# Patient Record
Sex: FEMALE | Race: WHITE | NOT HISPANIC OR LATINO | Employment: OTHER | URBAN - METROPOLITAN AREA
[De-identification: names, ages, dates, MRNs, and addresses within clinical notes are randomized per-mention and may not be internally consistent; named-entity substitution may affect disease eponyms.]

---

## 2017-01-24 ENCOUNTER — DOCUMENTATION ONLY (OUTPATIENT)
Dept: LAB | Facility: CLINIC | Age: 62
End: 2017-01-24

## 2017-01-24 DIAGNOSIS — I10 HYPERTENSION GOAL BP (BLOOD PRESSURE) < 150/90: Primary | ICD-10-CM

## 2017-01-24 DIAGNOSIS — E78.5 HYPERLIPIDEMIA LDL GOAL <130: ICD-10-CM

## 2017-01-24 NOTE — PROGRESS NOTES
Need previsit labs-see orders and close encounter if nothing else needed./Violet Elam,       Physical 2/6/17

## 2017-01-24 NOTE — PROGRESS NOTES
.Please place or confirm pending lab orders for upcoming lab appointment on 01/30/17.  ThanksLoly

## 2017-01-30 ENCOUNTER — TRANSFERRED RECORDS (OUTPATIENT)
Dept: HEALTH INFORMATION MANAGEMENT | Facility: CLINIC | Age: 62
End: 2017-01-30

## 2017-01-30 DIAGNOSIS — E78.5 HYPERLIPIDEMIA LDL GOAL <130: ICD-10-CM

## 2017-01-30 DIAGNOSIS — I10 HYPERTENSION GOAL BP (BLOOD PRESSURE) < 150/90: ICD-10-CM

## 2017-01-30 LAB
ANION GAP SERPL CALCULATED.3IONS-SCNC: 11 MMOL/L (ref 3–14)
BUN SERPL-MCNC: 19 MG/DL (ref 7–30)
CALCIUM SERPL-MCNC: 9.5 MG/DL (ref 8.5–10.1)
CHLORIDE SERPL-SCNC: 105 MMOL/L (ref 94–109)
CHOLEST SERPL-MCNC: 224 MG/DL
CO2 SERPL-SCNC: 24 MMOL/L (ref 20–32)
CREAT SERPL-MCNC: 0.83 MG/DL (ref 0.52–1.04)
GFR SERPL CREATININE-BSD FRML MDRD: 70 ML/MIN/1.7M2
GLUCOSE SERPL-MCNC: 107 MG/DL (ref 70–99)
HDLC SERPL-MCNC: 91 MG/DL
LDLC SERPL CALC-MCNC: 113 MG/DL
NONHDLC SERPL-MCNC: 133 MG/DL
POTASSIUM SERPL-SCNC: 4.1 MMOL/L (ref 3.4–5.3)
SODIUM SERPL-SCNC: 140 MMOL/L (ref 133–144)
TRIGL SERPL-MCNC: 102 MG/DL

## 2017-01-30 PROCEDURE — 80048 BASIC METABOLIC PNL TOTAL CA: CPT | Performed by: FAMILY MEDICINE

## 2017-01-30 PROCEDURE — 36415 COLL VENOUS BLD VENIPUNCTURE: CPT | Performed by: FAMILY MEDICINE

## 2017-01-30 PROCEDURE — 80061 LIPID PANEL: CPT | Performed by: FAMILY MEDICINE

## 2017-01-31 NOTE — PROGRESS NOTES
SUBJECTIVE:     CC: Nannette Awad is an 61 year old woman who presents for preventive health visit.     Healthy Habits:    Do you get at least three servings of calcium containing foods daily (dairy, green leafy vegetables, etc.)? 4-5 days a week     Amount of exercise or daily activities, outside of work: 3 day(s) per week    Problems taking medications regularly No    Medication side effects: No    Have you had an eye exam in the past two years? yes    Do you see a dentist twice per year? yes    Do you have sleep apnea, excessive snoring or daytime drowsiness?no        Pt states that her BP readings at home have been higher  140/60's   Skin lesion on left ear, never goes away, hard/crusty  Still with painful superficial clot on leg, wondering if it will improve, has tried hot packs.    Today's PHQ-2 Score:   PHQ-2 (  [employer]) 2017 2/15/2016   Q1: Little interest or pleasure in doing things 0 0   Q2: Feeling down, depressed or hopeless 0 0   PHQ-2 Score 0 0       Abuse: Current or Past(Physical, Sexual or Emotional)- No  Do you feel safe in your environment - Yes    Social History   Substance Use Topics     Smoking status: Never Smoker      Smokeless tobacco: Never Used     Alcohol Use: No     The patient does not drink >3 drinks per day nor >7 drinks per week.      Reviewed orders with patient.  Reviewed health maintenance and updated orders accordingly - Yes    G 3 P 2   No LMP recorded. Patient has had a hysterectomy.     Fasting: No   Td: tdap 10/10       Status post benign hysterectomy. Health Maintenance and Surgical History updated.     HPV: NA          Cholesterol: CHOL      224   2017  HDL       91   2017  LDL      113   2017  TRIG      102   2017  CHOLHDLRATIO      2.7   2015      MM/16  Dexa:  NA     Flex/colo:       Seat Belt: Yes    Sunscreen use: Yes   Calcium Intake: adeq  Health Care Directive: No  Sexually Active: Yes     Current contraception:  hysterectomy  History of abnormal Pap smear: Yes: see pmh/psh  Family history of colon/breast/ovarian cancer: Yes: see [facility]  Regular self breast exam: Yes  History of abnormal mammogram: Yes: see reports       Mammo Decision Support:  Patient over age 50, mutual decision to screen reflected in health maintenance.    Pertinent mammograms are reviewed under the imaging tab.    ROS:  C: NEGATIVE for fever, chills, change in weight  I: NEGATIVE for worrisome rashes, moles or lesions  E: NEGATIVE for vision changes or irritation  ENT: NEGATIVE for ear, mouth and throat problems  R: NEGATIVE for significant cough or SOB  B: NEGATIVE for masses, tenderness or discharge  CV: NEGATIVE for chest pain, palpitations or peripheral edema  GI: NEGATIVE for nausea, abdominal pain, heartburn, or change in bowel habits  : NEGATIVE for unusual urinary or vaginal symptoms. No vaginal bleeding.  M: NEGATIVE for significant arthralgias or myalgia  N: NEGATIVE for weakness, dizziness or paresthesias  P: NEGATIVE for changes in mood or affect     Problem list, Medication list, Allergies, and Medical/Social/Surgical histories reviewed in [facility] and updated as appropriate.  Labs reviewed in EPIC  BP Readings from Last 3 Encounters:   02/06/17 142/74   10/21/16 136/72   10/19/16 149/73    Wt Readings from Last 3 Encounters:   02/06/17 180 lb (81.647 kg)   10/21/16 185 lb (83.915 kg)   10/19/16 184 lb (83.462 kg)                  Patient Active Problem List   Diagnosis     Hyperlipidemia LDL goal <130     Hypertension goal BP (blood pressure) < 150/90     Advanced directives, counseling/discussion     Osteopenia     GERD (gastroesophageal reflux disease)     Family history of breast cancer     Migraine without status migrainosus, not intractable, unspecified migraine type     Alopecia     Elevated fasting glucose     Past Surgical History   Procedure Laterality Date     Breast biopsy, core rt/lt  1990     Surgical history of -   2006      conization for abnl PAP     C ligatn/strip long or short saphen  11/09     right leg     Esophagoscopy, gastroscopy, duodenoscopy (egd), combined  9/17/2012     Procedure: COMBINED ESOPHAGOSCOPY, GASTROSCOPY, DUODENOSCOPY (EGD), BIOPSY SINGLE OR MULTIPLE;  EGD-GERD;  Surgeon: Teo Alvarado MD;  Location: [facility] OR     Hysterectomy vaginal, bilateral salpingo-oopherectomy, combined  9/22/06     HSIL        Social History   Substance Use Topics     Smoking status: Never Smoker      Smokeless tobacco: Never Used     Alcohol Use: No     Family History   Problem Relation Age of Onset     HEART DISEASE Mother      CHF     Hypertension Mother      Lipids Mother      Neurologic Disorder Mother 30     migraines     Alzheimer Disease Mother 67     CANCER Maternal Grandmother      Hypertension Brother      Hypertension Brother      Thyroid Disease Paternal Grandmother      CANCER Father 76     Pancreatic ca age 76     Breast Cancer Paternal Grandmother      Breast Cancer Maternal Aunt          Current Outpatient Prescriptions   Medication Sig Dispense Refill     lisinopril (PRINIVIL/ZESTRIL) 10 MG tablet Take 1 tablet (10 mg) by mouth daily 90 tablet 1     pravastatin (PRAVACHOL) 40 MG tablet Take 1 tablet (40 mg) by mouth daily 90 tablet 3     amLODIPine (NORVASC) 10 MG tablet Take 1 tablet (10 mg) by mouth daily 90 tablet 1     sertraline (ZOLOFT) 100 MG tablet Take 1 tablet (100 mg) by mouth daily 90 tablet 3     omeprazole 20 MG tablet Take 1 tablet (20 mg) by mouth as needed Take 30-60 minutes before a meal. 90 tablet 3     rizatriptan (MAXALT-MLT) 10 MG disintegrating tablet Take 1 tablet (10 mg) by mouth See Admin Instructions WITH ONSET OF MIGRAINE, MAY REPEAT ONCE AFTER 2 HOURS. DO NOT EXCEED 3 TABLETS IN 24 HOURS. 25 tablet 3     Minoxidil (ROGAINE WOMENS) 5 % FOAM Apply to scalp twice daily 60 g 11     diphenhydrAMINE (BENADRYL) 25 MG tablet Take 25 mg by mouth. As needed       aspirin 81 MG tablet Take 1  "tablet by mouth daily.       FISH OIL daily.       Calcium Carbonate-Vitamin D (CALCIUM 500 + D PO) Take  by mouth. One tab three time a day  .        VITAMIN D 1000 UNIT OR TABS 1 TABLET DAILY       MULTI-DAY OR daily       [DISCONTINUED] sertraline (ZOLOFT) 50 MG tablet Take 2 tablets (100 mg) by mouth daily 180 tablet 0     [DISCONTINUED] lisinopril (PRINIVIL,ZESTRIL) 5 MG tablet Take 1 tablet (5 mg) by mouth daily 90 tablet 3     [DISCONTINUED] pravastatin (PRAVACHOL) 40 MG tablet Take 1 tablet (40 mg) by mouth daily 90 tablet 3     [DISCONTINUED] amLODIPine (NORVASC) 10 MG tablet Take 1 tablet (10 mg) by mouth daily 90 tablet 3     OBJECTIVE:     /74 mmHg  Pulse 93  Temp(Src) 98.9  F (37.2  C) (Oral)  Ht 5' 4.57\" (1.64 m)  Wt 180 lb (81.647 kg)  BMI 30.36 kg/m2  EXAM:  GENERAL APPEARANCE: healthy, alert and no distress  EYES: Eyes grossly normal to inspection, PERRL and conjunctivae and sclerae normal  HENT: ear canals and TM's normal, nose and mouth without ulcers or lesions, oropharynx clear and oral mucous membranes moist  NECK: no adenopathy, no asymmetry, masses, or scars and thyroid normal to palpation  RESP: lungs clear to auscultation - no rales, rhonchi or wheezes  BREAST: normal without masses, tenderness or nipple discharge and no palpable axillary masses or adenopathy  CV: regular rate and rhythm, normal S1 S2, no S3 or S4, no murmur, click or rub, no peripheral edema and peripheral pulses strong  ABDOMEN: soft, nontender, no hepatosplenomegaly, no masses and bowel sounds normal  MS: no musculoskeletal defects are noted and gait is age appropriate without ataxia  SKIN: no suspicious lesions or rashes, left ear - 3 mm raised keratotic lesion on helix  NEURO: Normal strength and tone, sensory exam grossly normal, mentation intact and speech normal  PSYCH: mentation appears normal and affect normal/bright    ASSESSMENT/PLAN:     (Z00.00) Routine general medical examination at a [facility]" "facility  (primary encounter diagnosis)  Comment: preventive needs reviewed  Plan: see orders in Epic.   Consider referral to gen surg if superficial thrombophlebitis continues to be difficult    (I10) Hypertension goal BP (blood pressure) < 150/90  Comment: not to goal  Plan: lisinopril (PRINIVIL/ZESTRIL) 10 MG tablet,         amLODIPine (NORVASC) 10 MG tablet, Basic         metabolic panel  (Ca, Cl, CO2, Creat, Gluc, K,         Na, BUN)        Increase lisinopril to 10 mg daily and f/u 2 wks for labs and bp check in pharmacy    (E78.5) Hyperlipidemia LDL goal <130  Comment: stable  Plan: pravastatin (PRAVACHOL) 40 MG tablet        Refill x 1 yr     (G43.909) Migraine without status migrainosus, not intractable, unspecified migraine type  Comment: doing well  Plan: rare ha, ok to fill if requested    (R73.01) Elevated fasting glucose  Comment: elevated on previsit labs  Plan: Hemoglobin A1c        Check a1c with next lab draw    (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 She has never used smokeless tobacco.    Estimated body mass index is 30.36 kg/(m^2) as calculated from the following:    Height as of this encounter: 5' 4.57\" (1.64 m).    Weight as of this encounter: 180 lb (81.647 kg).   Weight management plan: Discussed healthy diet and exercise guidelines and patient will follow up in 6 months in clinic to re-evaluate.    Counseling Resources:  ATP IV Guidelines  Pooled Cohorts Equation Calculator  Breast Cancer Risk Calculator  FRAX Risk Assessment  ICSI Preventive Guidelines  Dietary Guidelines for Americans, 2010  USDA's MyPlate  ASA Prophylaxis  Lung CA Screening    Joy Dickson, " MD  [facility]

## 2017-02-01 ENCOUNTER — TELEPHONE (OUTPATIENT)
Dept: FAMILY MEDICINE | Facility: CLINIC | Age: 62
End: 2017-02-01

## 2017-02-06 ENCOUNTER — OFFICE VISIT (OUTPATIENT)
Dept: FAMILY MEDICINE | Facility: CLINIC | Age: 62
End: 2017-02-06
Payer: COMMERCIAL

## 2017-02-06 VITALS
HEART RATE: 93 BPM | WEIGHT: 180 LBS | SYSTOLIC BLOOD PRESSURE: 142 MMHG | TEMPERATURE: 98.9 F | HEIGHT: 65 IN | DIASTOLIC BLOOD PRESSURE: 74 MMHG | BODY MASS INDEX: 29.99 KG/M2

## 2017-02-06 DIAGNOSIS — G43.909 MIGRAINE WITHOUT STATUS MIGRAINOSUS, NOT INTRACTABLE, UNSPECIFIED MIGRAINE TYPE: ICD-10-CM

## 2017-02-06 DIAGNOSIS — Z00.00 ROUTINE GENERAL MEDICAL EXAMINATION AT A HEALTH CARE FACILITY: Primary | ICD-10-CM

## 2017-02-06 DIAGNOSIS — R73.01 ELEVATED FASTING GLUCOSE: ICD-10-CM

## 2017-02-06 DIAGNOSIS — E78.5 HYPERLIPIDEMIA LDL GOAL <130: ICD-10-CM

## 2017-02-06 DIAGNOSIS — D48.5 NEOPLASM OF UNCERTAIN BEHAVIOR OF SKIN OF EAR: ICD-10-CM

## 2017-02-06 DIAGNOSIS — I10 HYPERTENSION GOAL BP (BLOOD PRESSURE) < 150/90: ICD-10-CM

## 2017-02-06 PROCEDURE — 99396 PREV VISIT EST AGE 40-64: CPT | Performed by: FAMILY MEDICINE

## 2017-02-06 PROCEDURE — 99212 OFFICE O/P EST SF 10 MIN: CPT | Mod: 25 | Performed by: FAMILY MEDICINE

## 2017-02-06 RX ORDER — RIZATRIPTAN BENZOATE 10 MG/1
10 TABLET, ORALLY DISINTEGRATING ORAL SEE ADMIN INSTRUCTIONS
Qty: 25 TABLET | Refills: 3 | Status: CANCELLED | OUTPATIENT
Start: 2017-02-06

## 2017-02-06 RX ORDER — LISINOPRIL 10 MG/1
10 TABLET ORAL DAILY
Qty: 90 TABLET | Refills: 1 | Status: SHIPPED | OUTPATIENT
Start: 2017-02-06 | End: 2017-09-11

## 2017-02-06 RX ORDER — PRAVASTATIN SODIUM 40 MG
40 TABLET ORAL DAILY
Qty: 90 TABLET | Refills: 3 | Status: SHIPPED | OUTPATIENT
Start: 2017-02-06 | End: 2018-04-09

## 2017-02-06 RX ORDER — AMLODIPINE BESYLATE 10 MG/1
10 TABLET ORAL DAILY
Qty: 90 TABLET | Refills: 1 | Status: SHIPPED | OUTPATIENT
Start: 2017-02-06 | End: 2017-09-30

## 2017-02-06 NOTE — NURSING NOTE
"Chief Complaint   Patient presents with     Physical       Initial /82 mmHg  Pulse 93  Temp(Src) 98.9  F (37.2  C) (Oral)  Ht 5' 4.57\" (1.64 m)  Wt 180 lb (81.647 kg)  BMI 30.36 kg/m2 Estimated body mass index is 30.36 kg/(m^2) as calculated from the following:    Height as of this encounter: 5' 4.57\" (1.64 m).    Weight as of this encounter: 180 lb (81.647 kg).  BP completed using cuff size: delmis Domingo CMA      "

## 2017-02-06 NOTE — MR AVS SNAPSHOT
After Visit Summary   2/6/2017    Nannette Awad    MRN: [id number]           Patient Information     Date Of Birth          1955        Visit Information        Provider Department      2/6/2017 10:00 AM Joy Dickson MD 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   o Discuss preventive care.    o Review your medicines if your doctor has prescribed any.      Get a Pap test every three years (unless you have an abnormal result and your provider advises testing more often).    If you get Pap tests with HPV test, you only need to test every 5 years, unless you have an abnormal result.     You do not need a Pap test if your uterus was removed (hysterectomy) and you have not had cancer.    You should be tested each year for STDs (sexually transmitted diseases) if you're at risk.     Have a mammogram every 1 to 2 years.    Have a colonoscopy at age 50, or have a yearly FIT test (stool test). These exams screen for colon cancer.      Have a cholesterol test every 5 years, or more often if advised.    Have a diabetes test (fasting glucose) every three years. If you are at risk for diabetes, you should have this test more often.     If you are at risk for osteoporosis (brittle bone disease), think about having a bone density scan (DEXA).    Shots: Get a flu shot each year. Get a tetanus shot every 10 years.    Nutrition:     Eat at least 5 servings of fruits and vegetables each  day.    Eat whole-grain bread, whole-wheat pasta and brown rice instead of white grains and rice.    Talk to your provider about Calcium and Vitamin D.     Lifestyle    Exercise at least 150 minutes a week (30 minutes a day, 5 days a week). This will help you control your weight and prevent disease.    Limit alcohol to one drink per day.    No smoking.     Wear sunscreen to prevent skin cancer.     See your dentist every six months for an exam and cleaning.    See your eye doctor every 1 to 2 years.            Follow-ups after your visit        Additional Services     DERMATOLOGY REFERRAL       Your provider has referred you to: [facility] [phone]   [email or web address]    Please be aware that coverage of these services is subject to the terms and limitations of your health insurance plan.  Call member services at your health plan with any benefit or coverage questions.      Please bring the following with you to your appointment:    (1) Any X-Rays, CTs or MRIs which have been performed.  Contact the facility where they were done to arrange for  prior to your scheduled appointment.    (2) List of current medications  (3) This referral request   (4) Any documents/labs given to you for this referral                  Your next 10 appointments already scheduled     Mar 11, 2017 10:10 AM   (Arrive by 9:55 AM)   Return Visit with Albert Trinidad MD   [facility] ([facility])    [address] [place] [address] [phone]              Future tests that were ordered for you today     Open Future Orders        Priority Expected Expires Ordered    Basic metabolic panel  (Ca, Cl, CO2, Creat, Gluc, K, Na, BUN) Routine 2/20/2017 2/6/2018 2/6/2017    Hemoglobin A1c Routine 2/20/2017 2/6/2018 2/6/2017            Who to contact     If you have questions or need follow up information about today's  "clinic visit or your schedule please contact [facility] directly at [phone].  Normal or non-critical lab and imaging results will be communicated to you by MyChart, letter or phone within 4 business days after the clinic has received the results. If you do not hear from us within 7 days, please contact the clinic through [employer] or phone. If you have a critical or abnormal lab result, we will notify you by phone as soon as possible.  Submit refill requests through [employer] or call your pharmacy and they will forward the refill request to us. Please allow 3 business days for your refill to be completed.          Additional Information About Your Visit        [employer] Information     [employer] gives you secure access to your electronic health record. If you see a primary care provider, you can also send messages to your care team and make appointments. If you have questions, please call your primary care clinic.  If you do not have a primary care provider, please call [phone] and they will assist you.        Care EveryWhere ID     This is your Care EveryWhere ID. This could be used by other organizations to access your [facility] medical records  [id number]        Your Vitals Were     Pulse Temperature Height BMI (Body Mass Index)          93 98.9  F (37.2  C) (Oral) 5' 4.57\" (1.64 m) 30.36 kg/m2         Blood Pressure from Last 3 Encounters:   02/06/17 142/74   10/21/16 136/72   10/19/16 149/73    Weight from Last 3 Encounters:   02/06/17 180 lb (81.647 kg)   10/21/16 185 lb (83.915 kg)   10/19/16 184 lb (83.462 kg)              We Performed the Following     DERMATOLOGY REFERRAL          Today's Medication Changes          These changes are accurate as of: 2/6/17 10:09 AM.  If you have any questions, ask your nurse or doctor.               These medicines have changed or have updated prescriptions.        Dose/Directions    lisinopril 10 MG tablet   Commonly known as:  PRINIVIL/ZESTRIL   This may " have changed:    - medication strength  - how much to take   Used for:  Hypertension goal BP (blood pressure) < 150/90   Changed by:  Joy Dickson MD        Dose:  10 mg   Take 1 tablet (10 mg) by mouth daily   Quantity:  90 tablet   Refills:  1       sertraline 100 MG tablet   Commonly known as:  ZOLOFT   This may have changed:  Another medication with the same name was removed. Continue taking this medication, and follow the directions you see here.   Used for:  Atypical facial pain   Changed by:  Albert Trinidad MD        Dose:  100 mg   Take 1 tablet (100 mg) by mouth daily   Quantity:  90 tablet   Refills:  3            Where to get your medicines      These medications were sent to [id number] - [place], [place] - [address] [facility]  [address] [facility] [address]     Phone:  [phone]    - amLODIPine 10 MG tablet  - lisinopril 10 MG tablet  - pravastatin 40 MG tablet             Primary Care Provider Office Phone # Fax #    Adrianne Felipe [phone]       [address] [address]        Thank you!     Thank you for choosing [facility]  [facility] your [facility]. Our goal is always to provide you with excellent care. Hearing back from our patients is one way we can continue to improve our services. Please take a few minutes to complete the written survey that you may receive in the mail after your visit with us. Thank you!             Your Updated Medication List - Protect others around you: Learn how to safely use, store and throw away your medicines at www.disposemymeds.org.          This list is accurate as of: 2/6/17 10:09 AM.  Always use your most recent med list.                   Brand Name Dispense Instructions for use    amLODIPine 10 MG tablet    NORVASC    90 tablet    Take 1 tablet (10 mg) by mouth daily       aspirin 81 MG tablet      Take 1 tablet by mouth daily.       BENADRYL 25 MG tablet    Generic drug:  diphenhydrAMINE      Take 25 mg by mouth. As needed       CALCIUM 500 + D PO      Take  by mouth. One tab three time a day  .       cholecalciferol 1000 UNIT tablet    vitamin D     1 TABLET DAILY       FISH OIL      daily.       lisinopril 10 MG tablet    PRINIVIL/ZESTRIL    90 tablet    Take 1 tablet (10 mg) by mouth daily       Minoxidil 5 % Foam    ROGAINE WOMENS    60 g    Apply to scalp twice daily       MULTI-DAY PO      daily       omeprazole 20 MG tablet     90 tablet    Take 1 tablet (20 mg) by mouth as needed Take 30-60 minutes before a meal.       pravastatin 40 MG tablet    PRAVACHOL    90 tablet    Take 1 tablet (40 mg) by mouth daily       rizatriptan 10 MG ODT tab    MAXALT-MLT    25 tablet    Take 1 tablet (10 mg) by mouth See Admin Instructions WITH ONSET OF MIGRAINE, MAY REPEAT ONCE AFTER 2 HOURS. DO NOT EXCEED 3 TABLETS IN 24 HOURS.       sertraline 100 MG tablet    ZOLOFT    90 tablet    Take 1 tablet (100 mg) by mouth daily

## 2017-02-20 ENCOUNTER — TELEPHONE (OUTPATIENT)
Dept: FAMILY MEDICINE | Facility: CLINIC | Age: 62
End: 2017-02-20

## 2017-02-20 ENCOUNTER — ALLIED HEALTH/NURSE VISIT (OUTPATIENT)
Dept: FAMILY MEDICINE | Facility: CLINIC | Age: 62
End: 2017-02-20
Payer: COMMERCIAL

## 2017-02-20 VITALS — DIASTOLIC BLOOD PRESSURE: 65 MMHG | HEART RATE: 66 BPM | SYSTOLIC BLOOD PRESSURE: 140 MMHG

## 2017-02-20 DIAGNOSIS — I80.00 SUPERFICIAL THROMBOPHLEBITIS OF LOWER EXTREMITY, UNSPECIFIED LATERALITY: Primary | ICD-10-CM

## 2017-02-20 DIAGNOSIS — R73.01 ELEVATED FASTING GLUCOSE: ICD-10-CM

## 2017-02-20 DIAGNOSIS — I10 HYPERTENSION GOAL BP (BLOOD PRESSURE) < 150/90: ICD-10-CM

## 2017-02-20 DIAGNOSIS — Z01.30 BP CHECK: Primary | ICD-10-CM

## 2017-02-20 LAB
ANION GAP SERPL CALCULATED.3IONS-SCNC: 6 MMOL/L (ref 3–14)
BUN SERPL-MCNC: 17 MG/DL (ref 7–30)
CALCIUM SERPL-MCNC: 9.7 MG/DL (ref 8.5–10.1)
CHLORIDE SERPL-SCNC: 102 MMOL/L (ref 94–109)
CO2 SERPL-SCNC: 28 MMOL/L (ref 20–32)
CREAT SERPL-MCNC: 0.76 MG/DL (ref 0.52–1.04)
GFR SERPL CREATININE-BSD FRML MDRD: 76 ML/MIN/1.7M2
GLUCOSE SERPL-MCNC: 120 MG/DL (ref 70–99)
HBA1C MFR BLD: 6 % (ref 4.3–6)
POTASSIUM SERPL-SCNC: 4.7 MMOL/L (ref 3.4–5.3)
SODIUM SERPL-SCNC: 136 MMOL/L (ref 133–144)

## 2017-02-20 PROCEDURE — 83036 HEMOGLOBIN GLYCOSYLATED A1C: CPT | Performed by: FAMILY MEDICINE

## 2017-02-20 PROCEDURE — 99207 ZZC NO CHARGE NURSE ONLY: CPT | Performed by: FAMILY MEDICINE

## 2017-02-20 PROCEDURE — 36415 COLL VENOUS BLD VENIPUNCTURE: CPT | Performed by: FAMILY MEDICINE

## 2017-02-20 PROCEDURE — 80048 BASIC METABOLIC PNL TOTAL CA: CPT | Performed by: FAMILY MEDICINE

## 2017-02-20 NOTE — MR AVS SNAPSHOT
After Visit Summary   2/20/2017    Nannette Awad    MRN: [id number]           Patient Information     Date Of Birth          1955        Visit Information        Provider Department      2/20/2017 9:41 AM Joy Dickson MD [facility]        Today's Diagnoses     BP check    -  1       Follow-ups after your visit        Your next 10 appointments already scheduled     Mar 11, 2017 10:10 AM CST   (Arrive by 9:55 AM)   Return Visit with Albert Trinidad MD   [facility] ([facility])    [address] [place] [address]  3rd [facility] [address] [phone]              Who to contact     If you have questions or need follow up information about today's clinic visit or your schedule please contact [facility] directly at [phone].  Normal or non-critical lab and imaging results will be communicated to you by MyChart, letter or phone within 4 business days after the clinic has received the results. If you do not hear from us within 7 days, please contact the clinic through MyChart or phone. If you have a critical or abnormal lab result, we will notify you by phone as soon as possible.  Submit refill requests through [employer] or call your pharmacy and they will forward the refill request to us. Please allow 3 business days for your refill to be completed.          Additional Information About Your Visit        MyChart Information     [employer] gives you secure access to your electronic health record. If you see a primary care provider, you can also send messages to your care team and make appointments. If you have questions, please call your primary care clinic.  If you do not have a primary care provider, please call [phone] and they will assist you.        Care EveryWhere ID     This is your Care EveryWhere ID. This could be used by other organizations to access your [place] medical records  [id number]        Your  Vitals Were     Pulse                   66            Blood Pressure from Last 3 Encounters:   02/20/17 140/65   02/06/17 142/74   10/21/16 136/72    Weight from Last 3 Encounters:   02/06/17 180 lb (81.6 kg)   10/21/16 185 lb (83.9 kg)   10/19/16 184 lb (83.5 kg)              Today, you had the following     No orders found for display       Primary Care Provider Office Phone # Fax #    Adrianne Felipe [phone]       [facility] [address] [facility] [address]        Thank you!     Thank you for choosing [facility]  for your care. Our goal is always to provide you with excellent care. Hearing back from our patients is one way we can continue to improve our services. Please take a few minutes to complete the written survey that you may receive in the mail after your visit with us. Thank you!             Your Updated Medication List - Protect others around you: Learn how to safely use, store and throw away your medicines at www.disposemymeds.org.          This list is accurate as of: 2/20/17  9:43 AM.  Always use your most recent med list.                   Brand Name Dispense Instructions for use    amLODIPine 10 MG tablet    NORVASC    90 tablet    Take 1 tablet (10 mg) by mouth daily       aspirin 81 MG tablet      Take 1 tablet by mouth daily.       BENADRYL 25 MG tablet   Generic drug:  diphenhydrAMINE      Take 25 mg by mouth. As needed       CALCIUM 500 + D PO      Take  by mouth. One tab three time a day  .       cholecalciferol 1000 UNIT tablet    vitamin D     1 TABLET DAILY       FISH OIL      daily.       lisinopril 10 MG tablet    PRINIVIL/ZESTRIL    90 tablet    Take 1 tablet (10 mg) by mouth daily       Minoxidil 5 % Foam    ROGAINE WOMENS    60 g    Apply to scalp twice daily       MULTI-DAY PO      daily       omeprazole 20 MG tablet     90 tablet    Take 1 tablet (20 mg) by mouth as needed Take 30-60 minutes before a meal.       pravastatin 40  MG tablet    PRAVACHOL    90 tablet    Take 1 tablet (40 mg) by mouth daily       rizatriptan 10 MG ODT tab    MAXALT-MLT    25 tablet    Take 1 tablet (10 mg) by mouth See Admin Instructions WITH ONSET OF MIGRAINE, MAY REPEAT ONCE AFTER 2 HOURS. DO NOT EXCEED 3 TABLETS IN 24 HOURS.       sertraline 100 MG tablet    ZOLOFT    90 tablet    Take 1 tablet (100 mg) by mouth daily

## 2017-02-20 NOTE — PROGRESS NOTES
Nannette Awad was evaluated in a [facility] on February 20, 2017 at which time her blood pressure was:    BP Readings from Last 3 Encounters:   02/20/17 140/65   02/06/17 142/74   10/21/16 136/72       Reviewed lifestyle modifications for blood pressure control and reduction: including making healthy food choices, managing weight, getting regular exercise, smoking cessation, reducing alcohol consumption, monitoring blood pressure regularly.     Nannette Awad is not experiencing symptoms.    Follow-Up: BP is at goal of < 150/90 mmHg (patient 60+ years of age without diabetes).  Recommended follow-up at pharmacy in 6 months.     Completed by:  Tanvir Bentley RP.  [facility]  [phone]

## 2017-02-20 NOTE — TELEPHONE ENCOUNTER
Left message for pt to confirm this is about the superficial clot in her leg vein.  She will call back to confirm why she would like to see Dr. Alvarado

## 2017-02-22 NOTE — TELEPHONE ENCOUNTER
Pt returning call pt states referral is for superficial clot in her leg vein and has gotten worse. Please advise and contact pt in regards once referral completed or with any questions. WORK: [phone]

## 2017-02-22 NOTE — TELEPHONE ENCOUNTER
Notified patient of message below. Gave # [phone] as this has changed, to schedule an appointment./Violet Elam,

## 2017-03-12 DIAGNOSIS — K21.9 GERD (GASTROESOPHAGEAL REFLUX DISEASE): Primary | ICD-10-CM

## 2017-03-14 NOTE — TELEPHONE ENCOUNTER
Prescription approved per [facility] [place] Refill Protocol.    Signed Prescriptions:                        Disp   Refills    omeprazole (PRILOSEC) 20 MG CR capsule     90 cap*3        Sig: TAKE 1 CAPSULE BY MOUTH AS NEEDED; TAKE 30 TO 60           MINUTES BEFORE A MEAL  Authorizing Provider: SHARIF CALLES  Ordering User: SONYA ROQUE RN, BSN

## 2017-03-23 ENCOUNTER — OFFICE VISIT (OUTPATIENT)
Dept: FAMILY MEDICINE | Facility: CLINIC | Age: 62
End: 2017-03-23
Payer: COMMERCIAL

## 2017-03-23 VITALS
OXYGEN SATURATION: 99 % | TEMPERATURE: 99.2 F | HEART RATE: 73 BPM | DIASTOLIC BLOOD PRESSURE: 78 MMHG | BODY MASS INDEX: 31.07 KG/M2 | WEIGHT: 182 LBS | HEIGHT: 64 IN | SYSTOLIC BLOOD PRESSURE: 136 MMHG

## 2017-03-23 DIAGNOSIS — R20.2 PARESTHESIA: ICD-10-CM

## 2017-03-23 DIAGNOSIS — M79.604 PAIN OF RIGHT LOWER EXTREMITY: Primary | ICD-10-CM

## 2017-03-23 PROCEDURE — 99214 OFFICE O/P EST MOD 30 MIN: CPT | Performed by: PHYSICIAN ASSISTANT

## 2017-03-23 NOTE — NURSING NOTE
"Chief Complaint   Patient presents with     Varicose Vein     R leg pain per pt x 2 days now and no known injury       Initial /78  Pulse 73  Temp 99.2  F (37.3  C) (Oral)  Ht 5' 4\" (1.626 m)  Wt 182 lb (82.6 kg)  SpO2 99%  Breastfeeding? No  BMI 31.24 kg/m2 Estimated body mass index is 31.24 kg/(m^2) as calculated from the following:    Height as of this encounter: 5' 4\" (1.626 m).    Weight as of this encounter: 182 lb (82.6 kg).  Medication Reconciliation: complete      Chavez Byrne MA    "

## 2017-03-23 NOTE — MR AVS SNAPSHOT
After Visit Summary   3/23/2017    Nannette Awad    MRN: [id number]           Patient Information     Date Of Birth          1955        Visit Information        Provider Department      3/23/2017 10:00 AM Bryanna Luis PA-C [facility]        Today's Diagnoses     Pain of right lower extremity    -  1    Paresthesia          Care Instructions    Ok to take ibuprofen with food, continue elevating  We will get ultrasound and follow up from there        Follow-ups after your visit        Your next 10 appointments already scheduled     Mar 23, 2017  7:10 PM CDT   US LOWER EXTREMITY VENOUS DUPLEX RIGHT with BEUS1   [facility] ([facility])    [address] [address] [phone]           Please bring a list of your medicines (including vitamins, minerals and over-the-counter drugs). Also, tell your doctor about any allergies you may have. Wear comfortable clothes and leave your valuables at home.  You do not need to do anything special to prepare for your exam.  Please call the Imaging Department at your exam site with any questions.            May 06, 2017 11:10 AM CDT   (Arrive by 10:55 AM)   Return Visit with Albert Trinidad MD   [facility] ([facility])    [address] [address] [phone]              Future tests that were ordered for you today     Open Future Orders        Priority Expected Expires Ordered    US Lower Extremity Venous Duplex Right Routine  3/23/2018 3/23/2017            Who to contact     If you have questions or need follow up information about today's clinic visit or your schedule please contact [facility] directly at [phone].  Normal or non-critical lab and imaging results will be communicated to you by MyChart, letter or phone within 4 business days after the clinic has received the results. If you  "do not hear from us within 7 days, please contact the clinic through [email or web address] or phone. If you have a critical or abnormal lab result, we will notify you by phone as soon as possible.  Submit refill requests through [email or web address] or call your pharmacy and they will forward the refill request to us. Please allow 3 business days for your refill to be completed.          Additional Information About Your Visit        [employer] Information     [email or web address] gives you secure access to your electronic health record. If you see a primary care provider, you can also send messages to your care team and make appointments. If you have questions, please call your primary care clinic.  If you do not have a primary care provider, please call [phone] and they will assist you.        Care EveryWhere ID     This is your Care EveryWhere ID. This could be used by other organizations to access your [facility] medical records  [id number]        Your Vitals Were     Pulse Temperature Height Pulse Oximetry Breastfeeding? BMI (Body Mass Index)    73 99.2  F (37.3  C) (Oral) 5' 4\" (1.626 m) 99% No 31.24 kg/m2       Blood Pressure from Last 3 Encounters:   03/23/17 136/78   02/20/17 140/65   02/06/17 142/74    Weight from Last 3 Encounters:   03/23/17 182 lb (82.6 kg)   02/06/17 180 lb (81.6 kg)   10/21/16 185 lb (83.9 kg)               Primary Care Provider Office Phone # Fax #    Adrianne Felipe [phone]       [facility] [address] [facility] [address]        Thank you!     Thank you for [facility]  [facility] your [facility]. Our goal is always to provide you with excellent care. Hearing back from our patients is one way we can continue to improve our services. Please take a few minutes to complete the written survey that you may receive in the mail after your visit with us. Thank you!             Your Updated Medication List - Protect others around you: Learn how to safely use, store and throw " away your medicines at www.disposemymeds.org.          This list is accurate as of: 3/23/17 10:35 AM.  Always use your most recent med list.                   Brand Name Dispense Instructions for use    amLODIPine 10 MG tablet    NORVASC    90 tablet    Take 1 tablet (10 mg) by mouth daily       aspirin 81 MG tablet      Take 1 tablet by mouth daily.       BENADRYL 25 MG tablet   Generic drug:  diphenhydrAMINE      Take 25 mg by mouth. As needed       CALCIUM 500 + D PO      Take  by mouth. One tab three time a day  .       cholecalciferol 1000 UNIT tablet    vitamin D     1 TABLET DAILY       FISH OIL      daily.       lisinopril 10 MG tablet    PRINIVIL/ZESTRIL    90 tablet    Take 1 tablet (10 mg) by mouth daily       MULTI-DAY PO      daily       omeprazole 20 MG tablet     90 tablet    Take 1 tablet (20 mg) by mouth as needed Take 30-60 minutes before a meal.       pravastatin 40 MG tablet    PRAVACHOL    90 tablet    Take 1 tablet (40 mg) by mouth daily       rizatriptan 10 MG ODT tab    MAXALT-MLT    25 tablet    Take 1 tablet (10 mg) by mouth See Admin Instructions WITH ONSET OF MIGRAINE, MAY REPEAT ONCE AFTER 2 HOURS. DO NOT EXCEED 3 TABLETS IN 24 HOURS.       sertraline 100 MG tablet    ZOLOFT    90 tablet    Take 1 tablet (100 mg) by mouth daily

## 2017-03-23 NOTE — PROGRESS NOTES
"  SUBJECTIVE:                                                    Nannette Awad is a 62 year old female who presents to clinic today for the following health issues:      Leg Pain     Onset: x 2 days    Description:   Location: R leg  Character: Sharp, Dull ache and Burning    Intensity: moderate    Progression of Symptoms: same    Accompanying Signs & Symptoms:  Other symptoms: numbness, tingling and warmth   History:   Previous similar pain: no,     Precipitating factors:   Trauma or overuse: no     Alleviating factors:  Improved by: nothing       Therapies Tried and outcome: none      Gets bumps and itching a lot of her legs.     Had thrombophlebitis of varicose vein superficially medial right knee region last year. Had US of legs done at that time. Was treated conservatively.     History of right leg vein procedure in 2009, had stripping of her veins.  No edema in her leg.      This started on Tuesday just suddenly pain in her right leg.  Hurts to bend her knee.  Very painful.  \"feel like a tight band\".  No known injury or overuse. No falls.  No recent airplane or car trips.  No chest pain or shortness of breath.  Pulse is normal.     Took ibuprofen this am but not helping.   Not on any estrogen.     History of HTN. Well controlled today.     Nonsmoker.       Problem list and histories reviewed & adjusted, as indicated.  Additional history: as documented    Patient Active Problem List   Diagnosis     Hyperlipidemia LDL goal <130     Hypertension goal BP (blood pressure) < 150/90     Advanced directives, counseling/discussion     Osteopenia     GERD (gastroesophageal reflux disease)     Family history of breast cancer     Migraine without status migrainosus, not intractable, unspecified migraine type     Alopecia     Elevated fasting glucose     Past Surgical History:   Procedure Laterality Date     BREAST BIOPSY, CORE RT/LT  1990     ESOPHAGOSCOPY, GASTROSCOPY, DUODENOSCOPY (EGD), COMBINED  9/17/2012    " Procedure: COMBINED ESOPHAGOSCOPY, GASTROSCOPY, DUODENOSCOPY (EGD), BIOPSY SINGLE OR MULTIPLE;  EGD-GERD;  Surgeon: Teo Alvarado MD;  Location: [facility] OR     HYSTERECTOMY VAGINAL, BILATERAL SALPINGO-OOPHERECTOMY, COMBINED  9/22/06    HSIL      LIGATN/STRIP LONG OR SHORT SAPHEN  11/09    right leg     SURGICAL HISTORY OF -   2006    conization for abnl PAP       Social History   Substance Use Topics     Smoking status: Never Smoker     Smokeless tobacco: Never Used     Alcohol use No     Family History   Problem Relation Age of Onset     HEART DISEASE Mother      CHF     Hypertension Mother      Lipids Mother      Neurologic Disorder Mother 30     migraines     Alzheimer Disease Mother 67     CANCER Maternal Grandmother      Hypertension Brother      Hypertension Brother      Thyroid Disease Paternal Grandmother      Breast Cancer Paternal Grandmother      CANCER Father 76     Pancreatic ca age 76     Breast Cancer Maternal Aunt          Current Outpatient Prescriptions   Medication Sig Dispense Refill     lisinopril (PRINIVIL/ZESTRIL) 10 MG tablet Take 1 tablet (10 mg) by mouth daily 90 tablet 1     pravastatin (PRAVACHOL) 40 MG tablet Take 1 tablet (40 mg) by mouth daily 90 tablet 3     amLODIPine (NORVASC) 10 MG tablet Take 1 tablet (10 mg) by mouth daily 90 tablet 1     sertraline (ZOLOFT) 100 MG tablet Take 1 tablet (100 mg) by mouth daily 90 tablet 3     omeprazole 20 MG tablet Take 1 tablet (20 mg) by mouth as needed Take 30-60 minutes before a meal. 90 tablet 3     rizatriptan (MAXALT-MLT) 10 MG disintegrating tablet Take 1 tablet (10 mg) by mouth See Admin Instructions WITH ONSET OF MIGRAINE, MAY REPEAT ONCE AFTER 2 HOURS. DO NOT EXCEED 3 TABLETS IN 24 HOURS. 25 tablet 3     diphenhydrAMINE (BENADRYL) 25 MG tablet Take 25 mg by mouth. As needed       aspirin 81 MG tablet Take 1 tablet by mouth daily.       FISH OIL daily.       Calcium Carbonate-Vitamin D (CALCIUM 500 + D PO) Take  by mouth. One tab  "three time a day  .        VITAMIN D 1000 UNIT OR TABS 1 TABLET DAILY       MULTI-DAY OR daily       Allergies   Allergen Reactions     Atorvastatin Calcium      cramps     Sulfa Drugs Rash       ROS:  Constitutional, HEENT, cardiovascular, pulmonary, gi and gu systems are negative, except as otherwise noted.    OBJECTIVE:                                                    /78  Pulse 73  Temp 99.2  F (37.3  C) (Oral)  Ht 5' 4\" (1.626 m)  Wt 182 lb (82.6 kg)  SpO2 99%  Breastfeeding? No  BMI 31.24 kg/m2  Body mass index is 31.24 kg/(m^2).  GENERAL: healthy, alert and no distress  RESP: lungs clear to auscultation - no rales, rhonchi or wheezes  CV: regular rate and rhythm, normal S1 S2, no S3 or S4, no murmur, click or rub, no peripheral edema and peripheral pulses strong  MS: R lower extremity---significant varicose veins present throughout leg. Homans sign neg. Tender over several different venous locations. No edema. DP pulse 2 plus. Distal sensation grossly intact. Range of motion at hip and knee and ankle and toes normal. No erythema or warmth to suggest infection.     SKIN: no suspicious lesions or rashes  NEURO: Normal strength and tone, mentation intact and speech normal  PSYCH: mentation appears normal, affect normal/bright       ASSESSMENT/PLAN:                                                    ASSESSMENT / PLAN:  (Q18.981) Pain of right lower extremity  (primary encounter diagnosis)  Comment:  Thrombosis versus thigh/calf sprain/strain versus referred hip or back pain. Could also be pain from varicose veins however this is a sudden change for her . Consider f/u with vascular surgeon/second opinion  Plan: US Lower Extremity Venous Duplex Right          To emergency room with worsening pain or chest pain or shortness of breath     Ultrasound scheduled for 7:55 tonight  Will get hold and call  If positive will have to go to emergency room, if negative will use conservative treatments and follow " up if not improving in 5 days    (R20.2) Paresthesia  Comment:   Plan: US Lower Extremity Venous Duplex Right              Patient Instructions   Ok to take ibuprofen with food, continue elevating  We will get ultrasound and follow up from there      Bryanna Luis PA-C  [facility]

## 2017-03-23 NOTE — PATIENT INSTRUCTIONS
Ok to take ibuprofen with food, continue elevating  We will get ultrasound and follow up from there

## 2017-03-24 ENCOUNTER — TELEPHONE (OUTPATIENT)
Dept: FAMILY MEDICINE | Facility: CLINIC | Age: 62
End: 2017-03-24

## 2017-03-24 ENCOUNTER — RADIANT APPOINTMENT (OUTPATIENT)
Dept: ULTRASOUND IMAGING | Facility: CLINIC | Age: 62
End: 2017-03-24
Attending: PHYSICIAN ASSISTANT
Payer: COMMERCIAL

## 2017-03-24 DIAGNOSIS — I80.9 PHLEBITIS OF SUPERFICIAL VEIN: Primary | ICD-10-CM

## 2017-03-24 DIAGNOSIS — R20.2 PARESTHESIA: ICD-10-CM

## 2017-03-24 DIAGNOSIS — M79.604 PAIN OF RIGHT LOWER EXTREMITY: ICD-10-CM

## 2017-03-24 PROCEDURE — 93971 EXTREMITY STUDY: CPT | Mod: RT

## 2017-03-24 NOTE — TELEPHONE ENCOUNTER
Notes Recorded by Bryanna Luis PA-C on 3/24/2017 at 3:07 PM  Please call patient:   Dear Nannette,      It was a pleasure to see you at your recent office visit.  Your test results are listed below.  US is negative for DVT.  Have her schedule with new vascular specialist, referral placed, as she is having ongoing symptoms in this leg.  I think a new specialist consult could be helpful.  Continue elevation and otc analgesics, heat or ice topically (can alternate) as neeeded for pain.         If you have any questions or concerns, please call the clinic at [phone].    Sincerely,  Bryanna Luis PA-C

## 2017-03-24 NOTE — PROGRESS NOTES
Please call patient:   Dear Nannette,      It was a pleasure to see you at your recent office visit.  Your test results are listed below.  US is negative for DVT.  Have her schedule with new vascular specialist, referral placed, as she is having ongoing symptoms in this leg.  I think a new specialist consult could be helpful.  Continue elevation and otc analgesics, heat or ice topically (can alternate) as neeeded for pain.         If you have any questions or concerns, please call the clinic at [phone].    Sincerely,  Bryanna Luis PA-C

## 2017-03-24 NOTE — TELEPHONE ENCOUNTER
Pt notified of provider message as written.  Pt verbalized good understanding.  Scheduling number given to pt.  Raissa Lea RN

## 2017-04-24 ASSESSMENT — ENCOUNTER SYMPTOMS
STIFFNESS: 1
JOINT SWELLING: 0
WEAKNESS: 0
MUSCLE CRAMPS: 0
ARTHRALGIAS: 1
SEIZURES: 0
NECK PAIN: 1
MUSCLE WEAKNESS: 0
TREMORS: 0
HEADACHES: 1
TINGLING: 1
BACK PAIN: 1
DIZZINESS: 1
MYALGIAS: 0
DISTURBANCES IN COORDINATION: 0
PARALYSIS: 0
SPEECH CHANGE: 0
MEMORY LOSS: 1
LOSS OF CONSCIOUSNESS: 0
NUMBNESS: 0

## 2017-05-06 ENCOUNTER — OFFICE VISIT (OUTPATIENT)
Dept: NEUROLOGY | Facility: CLINIC | Age: 62
End: 2017-05-06

## 2017-05-06 VITALS — DIASTOLIC BLOOD PRESSURE: 57 MMHG | SYSTOLIC BLOOD PRESSURE: 137 MMHG | HEART RATE: 72 BPM | HEIGHT: 64 IN

## 2017-05-06 DIAGNOSIS — I83.90 VARICOSITIES OF LEG: Primary | ICD-10-CM

## 2017-05-06 DIAGNOSIS — L65.9 HAIR LOSS: ICD-10-CM

## 2017-05-06 DIAGNOSIS — R51.9 FACIAL PAIN: ICD-10-CM

## 2017-05-06 DIAGNOSIS — I83.90 VARICOSITIES OF LEG: ICD-10-CM

## 2017-05-06 DIAGNOSIS — K14.6 BURNING MOUTH SYNDROME: ICD-10-CM

## 2017-05-06 LAB
ERYTHROCYTE [SEDIMENTATION RATE] IN BLOOD BY WESTERGREN METHOD: 14 MM/H (ref 0–30)
T4 FREE SERPL-MCNC: 0.88 NG/DL (ref 0.76–1.46)
TSH SERPL DL<=0.05 MIU/L-ACNC: 0.71 MU/L (ref 0.4–4)
VIT B12 SERPL-MCNC: 674 PG/ML (ref 193–986)

## 2017-05-06 RX ORDER — SERTRALINE HYDROCHLORIDE 25 MG/1
25 TABLET, FILM COATED ORAL DAILY
Qty: 90 TABLET | Refills: 3 | Status: SHIPPED | OUTPATIENT
Start: 2017-05-06 | End: 2017-06-28

## 2017-05-06 RX ORDER — SERTRALINE HYDROCHLORIDE 100 MG/1
100 TABLET, FILM COATED ORAL DAILY
Qty: 90 TABLET | Refills: 3 | Status: SHIPPED | OUTPATIENT
Start: 2017-05-06 | End: 2018-04-09

## 2017-05-06 ASSESSMENT — PAIN SCALES - GENERAL: PAINLEVEL: MODERATE PAIN (4)

## 2017-05-06 NOTE — MR AVS SNAPSHOT
"              After Visit Summary   5/6/2017    Nannette Awad    MRN: [id number]           Patient Information     Date Of Birth          1955        Visit Information        Provider Department      5/6/2017 11:10 AM Albert Trinidad MD [facility]        Today's Diagnoses     Varicosities of leg    -  1    Burning mouth syndrome        Facial pain        Hair loss           Follow-ups after your visit        Who to contact     Please call your clinic at [phone] to:    Ask questions about your health    Make or cancel appointments    Discuss your medicines    Learn about your test results    Speak to your doctor   If you have compliments or concerns about an experience at your clinic, or if you wish to file a complaint, please contact [facility] Patient Relations at [phone] or email us at [email or web address]         Additional Information About Your Visit        MyChart Information     [employer] gives you secure access to your electronic health record. If you see a primary care provider, you can also send messages to your care team and make appointments. If you have questions, please call your primary care clinic.  If you do not have a primary care provider, please call [phone] and they will assist you.      [employer] is an electronic gateway that provides easy, online access to your medical records. With [employer], you can request a clinic appointment, read your test results, renew a prescription or communicate with your care team.     To access your existing account, please contact your [facility] or call [phone] for assistance.        Care EveryWhere ID     This is your Care EveryWhere ID. This could be used by other organizations to access your [facility] medical records  [id number]        Your Vitals Were     Pulse Height                72 5' 4\" (162.6 cm)           Blood Pressure from Last 3 Encounters: "   05/06/17 137/57   03/23/17 136/78   02/20/17 140/65    Weight from Last 3 Encounters:   03/23/17 182 lb (82.6 kg)   02/06/17 180 lb (81.6 kg)   10/21/16 185 lb (83.9 kg)                 Today's Medication Changes          These changes are accurate as of: 5/6/17 11:59 PM.  If you have any questions, ask your nurse or doctor.               These medicines have changed or have updated prescriptions.        Dose/Directions    * sertraline 100 MG tablet   Commonly known as:  ZOLOFT   This may have changed:  Another medication with the same name was added. Make sure you understand how and when to take each.   Used for:  Atypical facial pain   Changed by:  Albert Trinidad MD        Dose:  100 mg   Take 1 tablet (100 mg) by mouth daily   Quantity:  90 tablet   Refills:  3       * sertraline 100 MG tablet   Commonly known as:  ZOLOFT   This may have changed:  You were already taking a medication with the same name, and this prescription was added. Make sure you understand how and when to take each.   Used for:  Facial pain   Changed by:  Albert Trinidad MD        Dose:  100 mg   Take 1 tablet (100 mg) by mouth daily   Quantity:  90 tablet   Refills:  3       * sertraline 25 MG tablet   Commonly known as:  ZOLOFT   This may have changed:  You were already taking a medication with the same name, and this prescription was added. Make sure you understand how and when to take each.   Used for:  Facial pain   Changed by:  Albert Trinidad MD        Dose:  25 mg   Take 1 tablet (25 mg) by mouth daily   Quantity:  90 tablet   Refills:  3       * Notice:  This list has 3 medication(s) that are the same as other medications prescribed for you. Read the directions carefully, and ask your doctor or other care provider to review them with you.         Where to get your medicines      These medications were sent to [address] - [address] [facility]  [address] [facility]  [facility] [address]     Phone:  [phone]     sertraline 100 MG tablet    sertraline 25 MG tablet                Primary Care Provider Office Phone # Fax #    Joy Dickson [phone]       [facility] [address] [address] [facility] [address]        Thank you!     Thank you for choosing [facility]  for your care. Our goal is always to provide you with excellent care. Hearing back from our patients is one way we can continue to improve our services. Please take a few minutes to complete the written survey that you may receive in the mail after your visit with us. Thank you!             Your Updated Medication List - Protect others around you: Learn how to safely use, store and throw away your medicines at www.disposemymeds.org.          This list is accurate as of: 5/6/17 11:59 PM.  Always use your most recent med list.                   Brand Name Dispense Instructions for use    amLODIPine 10 MG tablet    NORVASC    90 tablet    Take 1 tablet (10 mg) by mouth daily       aspirin 81 MG tablet      Take 1 tablet by mouth daily.       BENADRYL 25 MG tablet   Generic drug:  diphenhydrAMINE      Take 25 mg by mouth. As needed       CALCIUM 500 + D PO      Take  by mouth. One tab three time a day  .       cholecalciferol 1000 UNIT tablet    vitamin D     1 TABLET DAILY       FISH OIL      daily.       lisinopril 10 MG tablet    PRINIVIL/ZESTRIL    90 tablet    Take 1 tablet (10 mg) by mouth daily       MULTI-DAY PO      daily       omeprazole 20 MG tablet     90 tablet    Take 1 tablet (20 mg) by mouth as needed Take 30-60 minutes before a meal.       pravastatin 40 MG tablet    PRAVACHOL    90 tablet    Take 1 tablet (40 mg) by mouth daily       rizatriptan 10 MG ODT tab    MAXALT-MLT    25 tablet    Take 1 tablet (10 mg) by mouth See Admin Instructions WITH ONSET OF MIGRAINE, MAY REPEAT ONCE AFTER 2 HOURS. DO NOT EXCEED 3 TABLETS IN 24 HOURS.       * sertraline 100 MG tablet     ZOLOFT    90 tablet    Take 1 tablet (100 mg) by mouth daily       * sertraline 100 MG tablet    ZOLOFT    90 tablet    Take 1 tablet (100 mg) by mouth daily       * sertraline 25 MG tablet    ZOLOFT    90 tablet    Take 1 tablet (25 mg) by mouth daily       * Notice:  This list has 3 medication(s) that are the same as other medications prescribed for you. Read the directions carefully, and ask your doctor or other care provider to review them with you.

## 2017-05-06 NOTE — LETTER
2017       RE: Nannette Awad  [address] [facility] [address]     Dear Colleague,    Thank you for referring your patient, Nannette Awad, to the [facility] NEUROLOGY at [facility]. Please see a copy of my visit note below.    May 6, 2017      Joy Dickson MD   [facility]    [address] [place] [address]      RE: Nannette Awad   MRN: [id number]   : 1953      Dear Dr. Dickson:      Your patient, Nannette Awad, requested comprehensive consultation today on 2017.  Her chief complaint is that of a dysesthetic sensation involving her face in the left side of her neck.      The patient saw me on 2015.  She did quite well after that with sertraline for dysesthetic facial pain.  The patient was diagnosed with atypical facial pain and also had CMC arthritis of the thumb.  I did talk with her about further evaluation and follow up if she did not improve.  I have not seen her for 22 months.  The patient said that about 6 months ago she did note that her symptoms started to recur.  She said that they were almost totally gone.  The patient went on to tell me that her symptoms basically are the same.  This dysesthetic sensation involved the left maxillary area into the tragus and also the whole helix, as well as to the base of her neck on the left into the side of her lip.  She described this as having a burning quality.  The patient never had a rash with it.  She has never noticed any onset of her complaints with mastication and she said she does not have bruxism.  She never developed ptosis, diplopia, nor loss of sensation.  She denied any trouble swallowing.  The patient has not had any scalp tenderness and no visual disturbance including obscuration of vision.  She still sweats on the left side of her face.  She did develop a superficial phlebitis on 2017 involving the right medial portion of her leg near the knee.  She  does suffer from varicosities.  She continues to work as a full-time .  She has not taking any extra zinc or vitamin B6.        CURRENT MEDICATIONS:   She continues on:   1.  Vitamin D.    2.  Calcium carbonate.   3.  Fish oil.   4.  Aspirin.     5.  Daily Benadryl 25 mg.     6.  Rare Maxalt.     7.  Prilosec.     8.  Lisinopril.     9.  Pravastatin.     10.  Sertraline 100 mg.        She also has noted a new symptom that over the last 5 months she has developed a burning sensation involving the tip of her tongue.  She did go to her dental hygienist who wondered if she should not be on some type of steroid rinse.  She was told that she had burning tongue syndrome.  She uses Chapstick at times, but does not think she has an allergy to it.  The patient since I last saw has had no surgeries, new family history or other medical problems.  She did have on 03/28/2014 a B12 level of 764 picograms and a vitamin D level of 44.  She had negative hepatitis C serology on 02/15/2016 and her TSH on 02/20/2017 was 1.06.  She also had a nonfasting blood glucose then associated with a normal metabolic profile.  Her blood glucose was 120, and her A1c hemoglobin was 6.0.  Her LDL cholesterol was 113 and her HDL was 91, with a total cholesterol of 224.  The patient on 11/04/2013 had a normal complete blood count.  On the same date her B12 level was checked and it was 682 picograms.  The patient has not had any other complaints except for some chronic constipation issues.  She does not have any history of weight gain.  She does not feel excessively cold and there is nothing to suggest any hair loss.      Neurologic examination revealed a pleasant woman.  Gait, station, cerebellar testing, muscle stretch reflexes which were normal in the arms and normal in the legs with the right knee jerk being equal when done to reinforcement the left, plantar stimulation, strength, cranial nerve examination with a very slight myosis of the  left pupil with mild anisocoria compared to the right, superficial and cortical sensory testing are otherwise unremarkable.  She had a supple neck.  The patient denied any trouble with taste or smell.  She does have a murmur heard at the base of her heart that goes to the neck.  She said she has had this her whole life.  The patient had normal radial pulses.      IMPRESSION:   1.  Atypical facial pain.   2.  Burning tongue syndrome.      I reviewed with the patient her complaints.  I did recommend that she may consider an  increase  In her dose of sertraline to 150 mg.  I went over side effects and did tell her that if she did go off the medicine, she would have to carefully taper up it to avoid side effects.  From review from the Internet with her, alpha lipoic acid does seem to be a good choice to treat burning tongue syndrome and I have recommended that she take 600 mg daily.  I went over the use of alpha lipoic acid with her as a supplement.  I have recommended that she have B12 level, MMA, sed rate, and thyroid indices checked.  I did review with her the use of Benecol as a plant-based margarine which does help to reduce cholesterol.  She is going to keep in touch with me.  I did go over with her her superficial phlebitis and she does have significant varicosities and I did talk with her about the possibility of being seen by a vein surgeon here.      Thank you for allowing me to see this patient.      Sincerely yours,      Neville Trinidad MD       I spent 1 hour with the patient today.  Over 50% of the time this involved counseling and coordination of care.  A complete review of medical systems was done and a positive review is listed in the report above.         NEVILLE TRINIDAD MD             D: 05/15/2017 09:53   T: 05/15/2017 12:09   MT: AKA      Name:     FARZAD FELIX   MRN:      [id number]        Account:      [id number]   :      1955           Service Date: 2017      Document:  [id number]

## 2017-05-10 LAB — METHYLMALONATE SERPL-SCNC: 0.15

## 2017-05-11 ENCOUNTER — CARE COORDINATION (OUTPATIENT)
Dept: NEUROLOGY | Facility: CLINIC | Age: 62
End: 2017-05-11

## 2017-05-11 NOTE — PROGRESS NOTES
5/11/17:     Nieves Espinosa LPN McAllister, Angela, RN                   Caller name: patient returned your call and I let her know: all blood tests normal or negative[good news]     Treating provider/specialty: Amos

## 2017-05-11 NOTE — PROGRESS NOTES
----- Message -----      From: Albert Trinidad MD      Sent: 5/10/2017   1:23 PM        To: Marie Davis RN     Please tell her all blood tests normal or negative[good news]   ==========================================================================================    5/11/17: Left voicemail message asking for a call back.

## 2017-05-15 NOTE — PROGRESS NOTES
May 6, 2017      Joy Dickson MD   [facility]    [address] [place] [address]      RE: Nannette Awad   MRN: [id number]   : 1953      Dear Dr. Dickson:      Your patient, Nannette Awad, requested comprehensive consultation today on 2017.  Her chief complaint is that of a dysesthetic sensation involving her face in the left side of her neck.      The patient saw me on 2015.  She did quite well after that with sertraline for dysesthetic facial pain.  The patient was diagnosed with atypical facial pain and also had CMC arthritis of the thumb.  I did talk with her about further evaluation and follow up if she did not improve.  I have not seen her for 22 months.  The patient said that about 6 months ago she did note that her symptoms started to recur.  She said that they were almost totally gone.  The patient went on to tell me that her symptoms basically are the same.  This dysesthetic sensation involved the left maxillary area into the tragus and also the whole helix, as well as to the base of her neck on the left into the side of her lip.  She described this as having a burning quality.  The patient never had a rash with it.  She has never noticed any onset of her complaints with mastication and she said she does not have bruxism.  She never developed ptosis, diplopia, nor loss of sensation.  She denied any trouble swallowing.  The patient has not had any scalp tenderness and no visual disturbance including obscuration of vision.  She still sweats on the left side of her face.  She did develop a superficial phlebitis on 2017 involving the right medial portion of her leg near the knee.  She does suffer from varicosities.  She continues to work as a full-time .  She has not taking any extra zinc or vitamin B6.        CURRENT MEDICATIONS:   She continues on:   1.  Vitamin D.    2.  Calcium carbonate.   3.  Fish oil.   4.  Aspirin.     5.  Daily Benadryl  25 mg.     6.  Rare Maxalt.     7.  Prilosec.     8.  Lisinopril.     9.  Pravastatin.     10.  Sertraline 100 mg.        She also has noted a new symptom that over the last 5 months she has developed a burning sensation involving the tip of her tongue.  She did go to her dental hygienist who wondered if she should not be on some type of steroid rinse.  She was told that she had burning tongue syndrome.  She uses Chapstick at times, but does not think she has an allergy to it.  The patient since I last saw has had no surgeries, new family history or other medical problems.  She did have on 03/28/2014 a B12 level of 764 picograms and a vitamin D level of 44.  She had negative hepatitis C serology on 02/15/2016 and her TSH on 02/20/2017 was 1.06.  She also had a nonfasting blood glucose then associated with a normal metabolic profile.  Her blood glucose was 120, and her A1c hemoglobin was 6.0.  Her LDL cholesterol was 113 and her HDL was 91, with a total cholesterol of 224.  The patient on 11/04/2013 had a normal complete blood count.  On the same date her B12 level was checked and it was 682 picograms.  The patient has not had any other complaints except for some chronic constipation issues.  She does not have any history of weight gain.  She does not feel excessively cold and there is nothing to suggest any hair loss.      Neurologic examination revealed a pleasant woman.  Gait, station, cerebellar testing, muscle stretch reflexes which were normal in the arms and normal in the legs with the right knee jerk being equal when done to reinforcement the left, plantar stimulation, strength, cranial nerve examination with a very slight myosis of the left pupil with mild anisocoria compared to the right, superficial and cortical sensory testing are otherwise unremarkable.  She had a supple neck.  The patient denied any trouble with taste or smell.  She does have a murmur heard at the base of her heart that goes to the  neck.  She said she has had this her whole life.  The patient had normal radial pulses.      IMPRESSION:   1.  Atypical facial pain.   2.  Burning tongue syndrome.      I reviewed with the patient her complaints.  I did recommend that she may consider an  increase  In her dose of sertraline to 150 mg.  I went over side effects and did tell her that if she did go off the medicine, she would have to carefully taper up it to avoid side effects.  From review from the Internet with her, alpha lipoic acid does seem to be a good choice to treat burning tongue syndrome and I have recommended that she take 600 mg daily.  I went over the use of alpha lipoic acid with her as a supplement.  I have recommended that she have B12 level, MMA, sed rate, and thyroid indices checked.  I did review with her the use of Benecol as a plant-based margarine which does help to reduce cholesterol.  She is going to keep in touch with me.  I did go over with her her superficial phlebitis and she does have significant varicosities and I did talk with her about the possibility of being seen by a vein surgeon here.      Thank you for allowing me to see this patient.      Sincerely yours,      Neville Trinidad MD       I spent 1 hour with the patient today.  Over 50% of the time this involved counseling and coordination of care.  A complete review of medical systems was done and a positive review is listed in the report above.         NEVILLE TRINIDAD MD             D: 05/15/2017 09:53   T: 05/15/2017 12:09   MT: AKA      Name:     FARZAD FELIX   MRN:      [id number]        Account:      [id number]   :      1955           Service Date: 2017      Document: [id number]

## 2017-06-27 ENCOUNTER — CARE COORDINATION (OUTPATIENT)
Dept: NEUROLOGY | Facility: CLINIC | Age: 62
End: 2017-06-27

## 2017-06-27 NOTE — PROGRESS NOTES
Nannette called to report that she has not had any relief from the Alpha Lipoic and the increase in Sertraline. Her Sertraline was increased from 100 mg to 125 mg.    She continues to have constant burning of her tongue and  burning on the left side of her face.  She is wondering if there is anything else she could try.  I told her that I would let Dr. Trinidad know and call her back with his suggestions.  She verbalized agreement of this plan and was appreciative of my time.    Message sent to Dr. Trinidad.

## 2017-06-28 ENCOUNTER — TELEPHONE (OUTPATIENT)
Dept: NEUROLOGY | Facility: CLINIC | Age: 62
End: 2017-06-28

## 2017-06-28 DIAGNOSIS — R51.9 FACIAL PAIN: ICD-10-CM

## 2017-06-28 DIAGNOSIS — G50.1 ATYPICAL FACIAL PAIN: Primary | ICD-10-CM

## 2017-06-28 RX ORDER — SERTRALINE HYDROCHLORIDE 25 MG/1
TABLET, FILM COATED ORAL
Qty: 120 TABLET | Refills: 3 | Status: SHIPPED | OUTPATIENT
Start: 2017-06-28 | End: 2017-07-05

## 2017-06-28 RX ORDER — SERTRALINE HYDROCHLORIDE 25 MG/1
25 TABLET, FILM COATED ORAL DAILY
Qty: 90 TABLET | Refills: 3 | Status: SHIPPED | OUTPATIENT
Start: 2017-06-28 | End: 2017-06-28

## 2017-06-28 NOTE — TELEPHONE ENCOUNTER
Discussed sx's, no better. Will have her see Kim, increase sertraline to 150mg and try tea now. She will get back to me as to condition.

## 2017-06-29 ENCOUNTER — CARE COORDINATION (OUTPATIENT)
Dept: NEUROLOGY | Facility: CLINIC | Age: 62
End: 2017-06-29

## 2017-06-29 NOTE — PROGRESS NOTES
Evelia Torres Angela, RN                   Left patient a VM            Previous Messages       ----- Message -----      From: Radha Mccloud RN      Sent: 6/29/2017   9:46 AM        To: Clinic Pahiiazfuijl-Auyvoyql-[id number]-Uc     Please help patient get appointment with Dr Fenton in dental clinic per Amos. Referral is in chart.     Thanks,   consuelo   ----- Message -----      From: Radha Mccloud RN      Sent: 6/29/2017   6:32 AM        To: Radha Mccloud RN         ----- Message -----      From: Albert Trinidad MD      Sent: 6/28/2017  11:41 AM        To: Marie Davis RN     I called her and placed consult to dental facial pain clinic with Dr Fenton. i ALSO ENTERED NEW PRESCRIPTION FOR HIGHER DOSAGE ZOLOFT 150MG DAILY

## 2017-07-05 ENCOUNTER — CARE COORDINATION (OUTPATIENT)
Dept: NEUROLOGY | Facility: CLINIC | Age: 62
End: 2017-07-05

## 2017-07-05 DIAGNOSIS — R51.9 FACIAL PAIN: ICD-10-CM

## 2017-07-05 RX ORDER — SERTRALINE HYDROCHLORIDE 25 MG/1
TABLET, FILM COATED ORAL
Qty: 180 TABLET | Refills: 3 | Status: SHIPPED | OUTPATIENT
Start: 2017-07-05 | End: 2019-02-11 | Stop reason: DRUGHIGH

## 2017-07-05 NOTE — PROGRESS NOTES
Evelia Torres Angela, RN Cc: Marie Davis RN Hi Angie,     I tried setting up this appointment but they told me we actually need to fax in the order.Then once they receive it, they will call the patient to make the appointment. Would you be able to do that?     Evelia Humphreys            Previous Messages       ----- Message -----      From: Radha Mccloud, RN      Sent: 6/29/2017   9:46 AM        To: Clinic Fybdiqhpmulv-Iidugxvo-[id number]-Uc     Please help patient get an appointment with Dr Fenton in dental clinic per Amos. Referral is in chart.     Thanksconsuelo              7/5/17: Referral faxed to Dr. Fenton's office at [phone]. Patient notified.

## 2017-09-11 DIAGNOSIS — I10 HYPERTENSION GOAL BP (BLOOD PRESSURE) < 150/90: ICD-10-CM

## 2017-09-12 RX ORDER — LISINOPRIL 10 MG/1
TABLET ORAL
Qty: 90 TABLET | Refills: 0 | Status: SHIPPED | OUTPATIENT
Start: 2017-09-12 | End: 2017-10-19

## 2017-09-30 DIAGNOSIS — I10 HYPERTENSION GOAL BP (BLOOD PRESSURE) < 150/90: ICD-10-CM

## 2017-10-02 RX ORDER — AMLODIPINE BESYLATE 10 MG/1
TABLET ORAL
Qty: 90 TABLET | Refills: 0 | Status: SHIPPED | OUTPATIENT
Start: 2017-10-02 | End: 2017-12-18

## 2017-10-06 DIAGNOSIS — I10 HYPERTENSION GOAL BP (BLOOD PRESSURE) < 150/90: ICD-10-CM

## 2017-10-09 ENCOUNTER — ALLIED HEALTH/NURSE VISIT (OUTPATIENT)
Dept: NURSING | Facility: CLINIC | Age: 62
End: 2017-10-09
Payer: COMMERCIAL

## 2017-10-09 DIAGNOSIS — Z23 NEED FOR PROPHYLACTIC VACCINATION AND INOCULATION AGAINST INFLUENZA: Primary | ICD-10-CM

## 2017-10-09 DIAGNOSIS — K11.7 XEROSTOMIA: Primary | ICD-10-CM

## 2017-10-09 LAB
BASOPHILS # BLD AUTO: 0 10E9/L (ref 0–0.2)
BASOPHILS NFR BLD AUTO: 0.4 %
DIFFERENTIAL METHOD BLD: NORMAL
EOSINOPHIL # BLD AUTO: 0.3 10E9/L (ref 0–0.7)
EOSINOPHIL NFR BLD AUTO: 6.1 %
ERYTHROCYTE [DISTWIDTH] IN BLOOD BY AUTOMATED COUNT: 14.1 % (ref 10–15)
FERRITIN SERPL-MCNC: 79 NG/ML (ref 8–252)
FOLATE SERPL-MCNC: 61.8 NG/ML
HBA1C MFR BLD: 6.2 % (ref 4.3–6)
HCT VFR BLD AUTO: 41.5 % (ref 35–47)
HGB BLD-MCNC: 13.7 G/DL (ref 11.7–15.7)
LYMPHOCYTES # BLD AUTO: 1.2 10E9/L (ref 0.8–5.3)
LYMPHOCYTES NFR BLD AUTO: 21.7 %
MCH RBC QN AUTO: 29.4 PG (ref 26.5–33)
MCHC RBC AUTO-ENTMCNC: 33 G/DL (ref 31.5–36.5)
MCV RBC AUTO: 89 FL (ref 78–100)
MONOCYTES # BLD AUTO: 0.4 10E9/L (ref 0–1.3)
MONOCYTES NFR BLD AUTO: 6.3 %
NEUTROPHILS # BLD AUTO: 3.6 10E9/L (ref 1.6–8.3)
NEUTROPHILS NFR BLD AUTO: 65.5 %
PLATELET # BLD AUTO: 238 10E9/L (ref 150–450)
RBC # BLD AUTO: 4.66 10E12/L (ref 3.8–5.2)
TSH SERPL DL<=0.005 MIU/L-ACNC: 1.23 MU/L (ref 0.4–4)
VIT B12 SERPL-MCNC: 596 PG/ML (ref 193–986)
WBC # BLD AUTO: 5.5 10E9/L (ref 4–11)

## 2017-10-09 PROCEDURE — 82728 ASSAY OF FERRITIN: CPT | Performed by: PHYSICIAN ASSISTANT

## 2017-10-09 PROCEDURE — 82746 ASSAY OF FOLIC ACID SERUM: CPT | Performed by: PHYSICIAN ASSISTANT

## 2017-10-09 PROCEDURE — 84443 ASSAY THYROID STIM HORMONE: CPT | Performed by: PHYSICIAN ASSISTANT

## 2017-10-09 PROCEDURE — 90471 IMMUNIZATION ADMIN: CPT

## 2017-10-09 PROCEDURE — 83516 IMMUNOASSAY NONANTIBODY: CPT | Performed by: PHYSICIAN ASSISTANT

## 2017-10-09 PROCEDURE — 84207 ASSAY OF VITAMIN B-6: CPT | Mod: 90 | Performed by: PHYSICIAN ASSISTANT

## 2017-10-09 PROCEDURE — 99207 ZZC NO CHARGE NURSE ONLY: CPT

## 2017-10-09 PROCEDURE — 99000 SPECIMEN HANDLING OFFICE-LAB: CPT | Performed by: PHYSICIAN ASSISTANT

## 2017-10-09 PROCEDURE — 83036 HEMOGLOBIN GLYCOSYLATED A1C: CPT | Performed by: PHYSICIAN ASSISTANT

## 2017-10-09 PROCEDURE — 82607 VITAMIN B-12: CPT | Performed by: PHYSICIAN ASSISTANT

## 2017-10-09 PROCEDURE — 36415 COLL VENOUS BLD VENIPUNCTURE: CPT | Performed by: PHYSICIAN ASSISTANT

## 2017-10-09 PROCEDURE — 86141 C-REACTIVE PROTEIN HS: CPT | Performed by: PHYSICIAN ASSISTANT

## 2017-10-09 PROCEDURE — 85025 COMPLETE CBC W/AUTO DIFF WBC: CPT | Performed by: PHYSICIAN ASSISTANT

## 2017-10-09 PROCEDURE — 86235 NUCLEAR ANTIGEN ANTIBODY: CPT | Performed by: PHYSICIAN ASSISTANT

## 2017-10-09 PROCEDURE — 90686 IIV4 VACC NO PRSV 0.5 ML IM: CPT

## 2017-10-09 PROCEDURE — 84630 ASSAY OF ZINC: CPT | Mod: 90 | Performed by: PHYSICIAN ASSISTANT

## 2017-10-09 RX ORDER — LISINOPRIL 10 MG/1
TABLET ORAL
Qty: 90 TABLET | Refills: 0 | OUTPATIENT
Start: 2017-10-09

## 2017-10-09 NOTE — MR AVS SNAPSHOT
After Visit Summary   10/9/2017    Nannette Awad    MRN: [id number]           Patient Information     Date Of Birth          1955        Visit Information        Provider Department      10/9/2017 9:15 AM AN ANCILL[facility]        Today's Diagnoses     Need for prophylactic vaccination and inoculation against influenza    -  1       Follow-ups after your visit        Who to contact     If you have questions or need follow up information about today's clinic visit or your schedule please contact [facility] directly at [phone].  Normal or non-critical lab and imaging results will be communicated to you by Splashuphart, letter or phone within 4 business days after the clinic has received the results. If you do not hear from us within 7 days, please contact the clinic through Palingent or phone. If you have a critical or abnormal lab result, we will notify you by phone as soon as possible.  Submit refill requests through BloggersBase or call your pharmacy and they will forward the refill request to us. Please allow 3 business days for your refill to be completed.          Additional Information About Your Visit        MyChart Information     BloggersBase gives you secure access to your electronic health record. If you see a primary care provider, you can also send messages to your care team and make appointments. If you have questions, please call your primary care clinic.  If you do not have a primary care provider, please call [phone] and they will assist you.        Care EveryWhere ID     This is your Care EveryWhere ID. This could be used by other organizations to access your [facility] medical records  [id number]         Blood Pressure from Last 3 Encounters:   05/06/17 137/57   03/23/17 136/78   02/20/17 140/65    Weight from Last 3 Encounters:   03/23/17 182 lb (82.6 kg)   02/06/17 180 lb (81.6 kg)   10/21/16 185 lb (83.9 kg)              We Performed the Following      FLU VAC, SPLIT VIRUS IM > 3 YO (QUADRIVALENT) [86539]     Vaccine Administration, Initial [44165]        Primary Care Provider Office Phone # Fax #    Joy Dickson [phone] [address] [facility] [address]        Equal Access to Services     JOSE REN : Hadii aad ku hadasho Soomaali, waaxda luqadaha, qaybta kaalmada adeegyada, waxay mariin haykarstenn adeandrea layla katja ibrahim. So [facility] [phone].    ATENCIÓN: Si habla español, tiene a vo disposición servicios gratuitos de asistencia lingüística. [facility] [phone].    We comply with applicable federal civil rights laws and [place] laws. We do not discriminate on the basis of race, color, national origin, age, disability, sex, sexual orientation, or gender identity.            Thank you!     Thank you for choosing [facility]  for your care. Our goal is always to provide you with excellent care. Hearing back from our patients is one way we can continue to improve our services. Please take a few minutes to complete the written survey that you may receive in the mail after your visit with us. Thank you!             Your Updated Medication List - Protect others around you: Learn how to safely use, store and throw away your medicines at www.disposemymeds.org.          This list is accurate as of: 10/9/17  9:41 AM.  Always use your most recent med list.                   Brand Name Dispense Instructions for use Diagnosis    amLODIPine 10 MG tablet    NORVASC    90 tablet    TAKE ONE TABLET BY MOUTH ONCE DAILY    Hypertension goal BP (blood pressure) < 150/90       aspirin 81 MG tablet      Take 1 tablet by mouth daily.        BENADRYL 25 MG tablet   Generic drug:  diphenhydrAMINE      Take 25 mg by mouth. As needed        CALCIUM 500 + D PO      Take  by mouth. One tab three time a day  .        cholecalciferol 1000 UNIT tablet    vitamin D     1 TABLET DAILY        FISH OIL      daily.        lisinopril 10 MG  tablet    PRINIVIL/ZESTRIL    90 tablet    TAKE ONE TABLET BY MOUTH ONCE DAILY    Hypertension goal BP (blood pressure) < 150/90       MULTI-DAY PO      daily        omeprazole 20 MG tablet     90 tablet    Take 1 tablet (20 mg) by mouth as needed Take 30-60 minutes before a meal.    Gastroesophageal reflux disease without esophagitis       pravastatin 40 MG tablet    PRAVACHOL    90 tablet    Take 1 tablet (40 mg) by mouth daily    Hyperlipidemia LDL goal <130       rizatriptan 10 MG ODT tab    MAXALT-MLT    25 tablet    Take 1 tablet (10 mg) by mouth See Admin Instructions WITH ONSET OF MIGRAINE, MAY REPEAT ONCE AFTER 2 HOURS. DO NOT EXCEED 3 TABLETS IN 24 HOURS.    Migraine without status migrainosus, not intractable, unspecified migraine type       * sertraline 100 MG tablet    ZOLOFT    90 tablet    Take 1 tablet (100 mg) by mouth daily    Atypical facial pain       * sertraline 100 MG tablet    ZOLOFT    90 tablet    Take 1 tablet (100 mg) by mouth daily    Facial pain       * sertraline 25 MG tablet    ZOLOFT    180 tablet    Take 50mg daily[combine with previously prescribed 100mg daily-separate prescription]    Facial pain       * Notice:  This list has 3 medication(s) that are the same as other medications prescribed for you. Read the directions carefully, and ask your doctor or other care provider to review them with you.

## 2017-10-09 NOTE — PROGRESS NOTES
Injectable Influenza Immunization Documentation    1.  Is the person to be vaccinated sick today?   No    2. Does the person to be vaccinated have an allergy to a component   of the vaccine?   No    3. Has the person to be vaccinated ever had a serious reaction   to influenza vaccine in the past?   No    4. Has the person to be vaccinated ever had Guillain-Barré syndrome?   No    Form completed by Elo Pemberton MA

## 2017-10-10 LAB
CRP SERPL HS-MCNC: 4.3 MG/L
ENA SS-A IGG SER IA-ACNC: <0.2 AI (ref 0–0.9)
ENA SS-B IGG SER IA-ACNC: <0.2 AI (ref 0–0.9)
TTG IGA SER-ACNC: 1 U/ML
TTG IGG SER-ACNC: <1 U/ML
ZINC SERPL-MCNC: 88 UG/DL (ref 60–120)

## 2017-10-11 LAB — VIT B6 SERPL-MCNC: 119.7 NMOL/L (ref 20–125)

## 2017-10-12 DIAGNOSIS — I10 HYPERTENSION GOAL BP (BLOOD PRESSURE) < 150/90: ICD-10-CM

## 2017-10-12 RX ORDER — LISINOPRIL 10 MG/1
10 TABLET ORAL DAILY
Qty: 90 TABLET | Refills: 0 | OUTPATIENT
Start: 2017-10-12

## 2017-10-19 ENCOUNTER — TELEPHONE (OUTPATIENT)
Dept: FAMILY MEDICINE | Facility: CLINIC | Age: 62
End: 2017-10-19

## 2017-10-19 DIAGNOSIS — I10 HYPERTENSION GOAL BP (BLOOD PRESSURE) < 150/90: ICD-10-CM

## 2017-10-19 RX ORDER — LISINOPRIL 10 MG/1
10 TABLET ORAL DAILY
Qty: 90 TABLET | Refills: 0 | Status: SHIPPED | OUTPATIENT
Start: 2017-10-19 | End: 2017-12-18

## 2017-10-19 NOTE — TELEPHONE ENCOUNTER
Patient states she did receive the prescription from 9/12 refill for 90 days but has lost prescription. Patient aware may have to pay for out of pocket.   Refill sent to pharmacy  .Emerita JASMINEN, RN, CPN

## 2017-10-19 NOTE — TELEPHONE ENCOUNTER
Reason for Call:  Medication or medication refill:    Do you use a [facility]?  Name of the pharmacy and phone number for the current request:  [facility] [place] [phone]    Name of the medication requested:     Other request:     Can we leave a detailed message on this number? YES    Phone number patient can be reached at: Home number on file [phone]  (work)    Best Time:     Call taken on 10/19/2017 at 9:13 AM by Lydia Son

## 2017-12-18 DIAGNOSIS — I10 HYPERTENSION GOAL BP (BLOOD PRESSURE) < 150/90: ICD-10-CM

## 2017-12-19 RX ORDER — LISINOPRIL 10 MG/1
TABLET ORAL
Qty: 90 TABLET | Refills: 0 | Status: SHIPPED | OUTPATIENT
Start: 2017-12-19 | End: 2018-04-09

## 2017-12-19 RX ORDER — AMLODIPINE BESYLATE 10 MG/1
TABLET ORAL
Qty: 90 TABLET | Refills: 0 | Status: SHIPPED | OUTPATIENT
Start: 2017-12-19 | End: 2018-04-09

## 2018-04-01 ENCOUNTER — HEALTH MAINTENANCE LETTER (OUTPATIENT)
Age: 63
End: 2018-04-01

## 2018-04-05 NOTE — PATIENT INSTRUCTIONS
Check with insurance if Shingrix is covered, is it less expensive at a pharmacy vs at the doctor's office.    Preventive Health Recommendations    Female Ages 65 +    Yearly exam:     See your health care provider every year in order to  o Review health changes.   o Discuss preventive care.    o Review your medicines if your doctor has prescribed any.      You no longer need a yearly Pap test unless you've had an abnormal Pap test in the past 10 years. If you have vaginal symptoms, such as bleeding or discharge, be sure to talk with your provider about a Pap test.      Every 1 to 2 years, have a mammogram.  If you are over 69, talk with your health care provider about whether or not you want to continue having screening mammograms.      Every 10 years, have a colonoscopy. Or, have a yearly FIT test (stool test). These exams will check for colon cancer.       Have a cholesterol test every 5 years, or more often if your doctor advises it.       Have a diabetes test (fasting glucose) every three years. If you are at risk for diabetes, you should have this test more often.       At age 65, have a bone density scan (DEXA) to check for osteoporosis (brittle bone disease).    Shots:    Get a flu shot each year.    Get a tetanus shot every 10 years.    Talk to your doctor about your pneumonia vaccines. There are now two you should receive - Pneumovax (PPSV 23) and Prevnar (PCV 13).    Talk to your doctor about the shingles vaccine.    Talk to your doctor about the hepatitis B vaccine.    Nutrition:     Eat at least 5 servings of fruits and vegetables each day.      Eat whole-grain bread, whole-wheat pasta and brown rice instead of white grains and rice.      Talk to your provider about Calcium and Vitamin D.     Lifestyle    Exercise at least 150 minutes a week (30 minutes a day, 5 days a week). This will help you control your weight and prevent disease.      Limit alcohol to one drink per day.      No smoking.       Wear  sunscreen to prevent skin cancer.       See your dentist twice a year for an exam and cleaning.      See your eye doctor every 1 to 2 years to screen for conditions such as glaucoma, macular degeneration and cataracts.

## 2018-04-05 NOTE — PROGRESS NOTES
SUBJECTIVE:   Nannette Awad is a 63 year old female who presents for Preventive Visit.  Are you in the first 12 months of your Medicare Part B coverage?  No    Answers for HPI/ROS submitted by the patient on 4/9/2018   Annual Exam:  Getting at least 3 servings of Calcium per day:: NO  Bi-annual eye exam:: Yes  Dental care twice a year:: Yes  Sleep apnea or symptoms of sleep apnea:: None  Taking medications regularly:: Yes  Additional concerns today:: YES  Activities of Daily Living: no assistance needed  Home safety: no safety concerns identified  Hearing Impairment:: need to ask people to speak up or repeat themselves  PHQ-2 Score: 0    Fallen 2 or more times in the past year?: No  Any fall with injury in the past year?: No        COGNITIVE SCREEN  1) Repeat 3 items (Banana, Sunrise, Chair)    2) Clock draw: NORMAL  3) 3 item recall: Recalls 2 objects   Results: NORMAL clock, 1-2 items recalled: COGNITIVE IMPAIRMENT LESS LIKELY    Mini-CogTM Humberto Cole. Licensed by the author for use in [facility]; reprinted with permission ([email or web address]@[email or web address]). All rights reserved.                Reviewed and updated as needed this visit by clinical staff  Tobacco  Allergies  Meds  Problems  Med Hx  Surg Hx  Fam Hx  Soc Hx          Reviewed and updated as needed this visit by Provider  Allergies  Meds  Problems        Social History   Substance Use Topics     Smoking status: Never Smoker     Smokeless tobacco: Never Used     Alcohol use No       If you drink alcohol do you typically have >3 drinks per day or >7 drinks per week? No                        Today's PHQ-2 Score:   PHQ-2 ( 1999 [employer]) 4/9/2018 4/9/2018   Q1: Little interest or pleasure in doing things 0 0   Q2: Feeling down, depressed or hopeless 0 0   PHQ-2 Score 0 0   Q1: Little interest or pleasure in doing things Not at all -   Q2: Feeling down, depressed or hopeless Not at all -   PHQ-2 Score 0 -       Do you feel safe in your  environment - Yes    G 3 P 2   No LMP recorded. Patient has had a hysterectomy.     Fasting: No   Td: tdap 10/10       Status post benign hysterectomy. Health Maintenance and Surgical History updated.               Cholesterol:   Lab Results   Component Value Date    CHOL 224 01/30/2017     Lab Results   Component Value Date    HDL 91 01/30/2017     Lab Results   Component Value Date     01/30/2017     Lab Results   Component Value Date    TRIG 102 01/30/2017     Lab Results   Component Value Date    CHOLHDLRATIO 2.7 02/02/2015         MMG: due  Dexa:  due     Flex/colo: 4/14 q 5 yr      Seat Belt: Yes    Sunscreen use: Yes   Calcium Intake: adeq  Health Care Directive: No  Sexually Active: Yes     Current contraception: hysterectomy  History of abnormal Pap smear: Yes: see pmh/psh  Family history of colon/breast/ovarian cancer: Yes: see [facility]  Regular self breast exam: Yes  History of abnormal mammogram: Yes: see reports      Do you have a Health Care Directive?: No: Advance care planning reviewed with patient; information given to patient to review.    Current providers sharing in care for this patient include:   Patient Care Team:  Joy Dickson MD as PCP - General (Family Practice)    The following health maintenance items are reviewed in Epic and correct as of today:  Health Maintenance   Topic Date Due     DEXA Q5 YR  08/01/2017     BMP Q6 MOS  08/20/2017     LIPID MONITORING Q1 YEAR  01/30/2018     MAMMO Q1 YR  01/30/2018     FALL RISK ASSESSMENT  02/24/2018     PNEUMOCOCCAL (1 of 2 - PCV13) 02/24/2018     INFLUENZA VACCINE (SYSTEM ASSIGNED)  09/01/2018     PAP Q3 YR  02/15/2019     COLONOSCOPY Q5 YR  04/08/2019     TETANUS IMMUNIZATION (SYSTEM ASSIGNED)  10/15/2020     ADVANCE DIRECTIVE PLANNING Q5 YRS  02/06/2022     MIGRAINE ACTION PLAN  Completed     HEPATITIS C SCREENING  Completed     Labs reviewed in EPIC  BP Readings from Last 3 Encounters:   04/09/18 136/66   05/06/17 137/57   03/23/17  136/78    Wt Readings from Last 3 Encounters:   04/09/18 179 lb 12.8 oz (81.6 kg)   03/23/17 182 lb (82.6 kg)   02/06/17 180 lb (81.6 kg)                  Patient Active Problem List   Diagnosis     Hyperlipidemia LDL goal <130     Hypertension goal BP (blood pressure) < 150/90     Advanced directives, counseling/discussion     Osteopenia     GERD (gastroesophageal reflux disease)     Family history of breast cancer     Migraine without status migrainosus, not intractable, unspecified migraine type     Alopecia     Elevated fasting glucose     Past Surgical History:   Procedure Laterality Date     BREAST BIOPSY, CORE RT/LT  1990     ESOPHAGOSCOPY, GASTROSCOPY, DUODENOSCOPY (EGD), COMBINED  9/17/2012    Procedure: COMBINED ESOPHAGOSCOPY, GASTROSCOPY, DUODENOSCOPY (EGD), BIOPSY SINGLE OR MULTIPLE;  EGD-GERD;  Surgeon: Teo Alvarado MD;  Location: [facility] OR     HYSTERECTOMY VAGINAL, BILATERAL SALPINGO-OOPHERECTOMY, COMBINED  9/22/06    HSIL      LIGATN/STRIP LONG OR SHORT SAPHEN  11/09    right leg     SURGICAL HISTORY OF -   2006    conization for abnl PAP       Social History   Substance Use Topics     Smoking status: Never Smoker     Smokeless tobacco: Never Used     Alcohol use No     Family History   Problem Relation Age of Onset     HEART DISEASE Mother      CHF     Hypertension Mother      Lipids Mother      Neurologic Disorder Mother 30     migraines     Alzheimer Disease Mother 67     CANCER Maternal Grandmother      Hypertension Brother      Hypertension Brother      Thyroid Disease Paternal Grandmother      Breast Cancer Paternal Grandmother      CANCER Father 76     Pancreatic ca age 76     Breast Cancer Maternal Aunt          Current Outpatient Prescriptions   Medication Sig Dispense Refill     amLODIPine (NORVASC) 10 MG tablet Take 1 tablet (10 mg) by mouth daily 90 tablet 1     lisinopril (PRINIVIL/ZESTRIL) 10 MG tablet Take 1 tablet (10 mg) by mouth daily 90 tablet 1     pravastatin (PRAVACHOL) 40  MG tablet Take 1 tablet (40 mg) by mouth daily 90 tablet 3     omeprazole 20 MG tablet Take 1 tablet (20 mg) by mouth as needed Take 30-60 minutes before a meal. 90 tablet 3     sertraline (ZOLOFT) 100 MG tablet Take 1.5 tablets (150 mg) by mouth daily 135 tablet 1     sertraline (ZOLOFT) 25 MG tablet Take 50mg daily[combine with previously prescribed 100mg daily-separate prescription] 180 tablet 3     rizatriptan (MAXALT-MLT) 10 MG disintegrating tablet Take 1 tablet (10 mg) by mouth See Admin Instructions WITH ONSET OF MIGRAINE, MAY REPEAT ONCE AFTER 2 HOURS. DO NOT EXCEED 3 TABLETS IN 24 HOURS. 25 tablet 3     diphenhydrAMINE (BENADRYL) 25 MG tablet Take 25 mg by mouth. As needed       aspirin 81 MG tablet Take 1 tablet by mouth daily.       FISH OIL daily.       Calcium Carbonate-Vitamin D (CALCIUM 500 + D PO) Take  by mouth. One tab three time a day  .        VITAMIN D 1000 UNIT OR TABS 1 TABLET DAILY       MULTI-DAY OR daily       [DISCONTINUED] amLODIPine (NORVASC) 10 MG tablet TAKE ONE TABLET BY MOUTH ONCE DAILY 90 tablet 0     [DISCONTINUED] lisinopril (PRINIVIL/ZESTRIL) 10 MG tablet TAKE ONE TABLET BY MOUTH ONCE DAILY 90 tablet 0     [DISCONTINUED] sertraline (ZOLOFT) 100 MG tablet Take 1 tablet (100 mg) by mouth daily 90 tablet 3     [DISCONTINUED] pravastatin (PRAVACHOL) 40 MG tablet Take 1 tablet (40 mg) by mouth daily 90 tablet 3     [DISCONTINUED] sertraline (ZOLOFT) 100 MG tablet Take 1 tablet (100 mg) by mouth daily 90 tablet 3       Pneumonia Vaccine:Adults age 65+ who have not received previous Pneumovax (PPSV23) or PCV13 as an adult: Should first be given PCV13 AND then should be given PPSV23 6-12 months after PCV13  Mammogram Screening: Patient over age 50, mutual decision to screen reflected in health maintenance.  Shingrix:    ROS:  Constitutional, HEENT, cardiovascular, pulmonary, gi and gu systems are negative, except as otherwise noted.    OBJECTIVE:   /66  Pulse 79  Temp 98.6  F  "(37  C) (Oral)  Resp 24  Ht 5' 4\" (1.626 m)  Wt 179 lb 12.8 oz (81.6 kg)  SpO2 97%  BMI 30.86 kg/m2 Estimated body mass index is 30.86 kg/(m^2) as calculated from the following:    Height as of this encounter: 5' 4\" (1.626 m).    Weight as of this encounter: 179 lb 12.8 oz (81.6 kg).  EXAM:   GENERAL APPEARANCE: healthy, alert and no distress  EYES: Eyes grossly normal to inspection, PERRL and conjunctivae and sclerae normal  HENT: ear canals and TM's normal, nose and mouth without ulcers or lesions, oropharynx clear and oral mucous membranes moist  NECK: no adenopathy, no asymmetry, masses, or scars and thyroid normal to palpation  RESP: lungs clear to auscultation - no rales, rhonchi or wheezes  BREAST: normal without masses, tenderness or nipple discharge and no palpable axillary masses or adenopathy  CV: regular rate and rhythm, normal S1 S2, no S3 or S4, no murmur, click or rub, no peripheral edema and peripheral pulses strong  ABDOMEN: soft, nontender, no hepatosplenomegaly, no masses and bowel sounds normal   (female): normal female external genitalia, normal urethral meatus, vaginal mucosal atrophy noted, atrophic skin of vulva  MS: no musculoskeletal defects are noted and gait is age appropriate without ataxia  SKIN: no suspicious lesions or rashes  NEURO: Normal strength and tone, sensory exam grossly normal, mentation intact and speech normal  PSYCH: mentation appears normal and affect normal/bright    ASSESSMENT / PLAN:   (Z00.00) Encounter for routine adult health examination without abnormal findings  (primary encounter diagnosis)  Comment: preventive needs reviewed  Plan: see orders in Epic.     (I10) Hypertension goal BP (blood pressure) < 150/90  Comment: to goal  Plan: BASIC METABOLIC PANEL, amLODIPine (NORVASC) 10         MG tablet, lisinopril (PRINIVIL/ZESTRIL) 10 MG         tablet         Refill x 6 months f/u 6 months for pharmacy bp check and labs     (E78.5) Hyperlipidemia LDL goal " "<130  Comment: stabel  Plan: Lipid panel reflex to direct LDL Fasting,         pravastatin (PRAVACHOL) 40 MG tablet        Refill x 1 yr     (R73.09) Elevated hemoglobin A1c  Comment: in past  Plan: Hemoglobin A1c            (R51) Facial pain  Comment: stable  Plan: sertraline (ZOLOFT) 100 MG tablet        Refill x 1 yr     (K21.9) Gastroesophageal reflux disease without esophagitis  Comment: controlled  Plan: omeprazole 20 MG tablet        Trial of qod dosing  Refill x 1 yr     (Z78.0) Asymptomatic postmenopausal status  Comment: due  Plan: DEXA HIP/PELVIS/SPINE - Future            (Z12.31) Visit for screening mammogram  Comment: due  Plan: MA SCREENING DIGITAL BILAT - Future  (s+30)            (Z23) Need for prophylactic vaccination against Streptococcus pneumoniae (pneumococcus)  Comment: due  Plan: Pneumococcal vaccine 13 valent PCV13 IM         (Prevnar) [[id number]]              End of Life Planning:  Patient currently has an advanced directive: No.  I have verified the patient's ablity to prepare an advanced directive/make health care decisions.  Literature was provided to assist patient in preparing an advanced directive.    COUNSELING:  Reviewed preventive health counseling, as reflected in patient instructions  Special attention given to:       Regular exercise       Healthy diet/nutrition        Estimated body mass index is 30.86 kg/(m^2) as calculated from the following:    Height as of this encounter: 5' 4\" (1.626 m).    Weight as of this encounter: 179 lb 12.8 oz (81.6 kg).  Weight management plan: Discussed healthy diet and exercise guidelines and patient will follow up in 12 months in clinic to re-evaluate.     reports that she has never smoked. She has never used smokeless tobacco.      Appropriate preventive services were discussed with this patient, including applicable screening as appropriate for cardiovascular disease, diabetes, osteopenia/osteoporosis, and glaucoma.  As appropriate for age/gender, " discussed screening for colorectal cancer, prostate cancer, breast cancer, and cervical cancer. Checklist reviewing preventive services available has been given to the patient.    Reviewed patients plan of care and provided an AVS. The Basic Care Plan (routine screening as documented in Health Maintenance) for Nannette meets the Care Plan requirement. This Care Plan has been established and reviewed with the Patient.    Counseling Resources:  ATP IV Guidelines  Pooled Cohorts Equation Calculator  Breast Cancer Risk Calculator  FRAX Risk Assessment  ICSI Preventive Guidelines  Dietary Guidelines for Americans, 2010  USDA's MyPlate  ASA Prophylaxis  Lung CA Screening    Joy Dickson MD  [facility]

## 2018-04-09 ENCOUNTER — OFFICE VISIT (OUTPATIENT)
Dept: FAMILY MEDICINE | Facility: CLINIC | Age: 63
End: 2018-04-09
Payer: COMMERCIAL

## 2018-04-09 VITALS
WEIGHT: 179.8 LBS | OXYGEN SATURATION: 97 % | BODY MASS INDEX: 30.7 KG/M2 | DIASTOLIC BLOOD PRESSURE: 66 MMHG | HEIGHT: 64 IN | SYSTOLIC BLOOD PRESSURE: 136 MMHG | TEMPERATURE: 98.6 F | HEART RATE: 79 BPM | RESPIRATION RATE: 24 BRPM

## 2018-04-09 DIAGNOSIS — Z23 NEED FOR PROPHYLACTIC VACCINATION AGAINST STREPTOCOCCUS PNEUMONIAE (PNEUMOCOCCUS): ICD-10-CM

## 2018-04-09 DIAGNOSIS — Z78.0 ASYMPTOMATIC POSTMENOPAUSAL STATUS: ICD-10-CM

## 2018-04-09 DIAGNOSIS — R73.09 ELEVATED HEMOGLOBIN A1C: ICD-10-CM

## 2018-04-09 DIAGNOSIS — E78.5 HYPERLIPIDEMIA LDL GOAL <130: ICD-10-CM

## 2018-04-09 DIAGNOSIS — Z12.31 VISIT FOR SCREENING MAMMOGRAM: ICD-10-CM

## 2018-04-09 DIAGNOSIS — Z00.00 ENCOUNTER FOR ROUTINE ADULT HEALTH EXAMINATION WITHOUT ABNORMAL FINDINGS: Primary | ICD-10-CM

## 2018-04-09 DIAGNOSIS — K21.9 GASTROESOPHAGEAL REFLUX DISEASE WITHOUT ESOPHAGITIS: ICD-10-CM

## 2018-04-09 DIAGNOSIS — I10 HYPERTENSION GOAL BP (BLOOD PRESSURE) < 150/90: ICD-10-CM

## 2018-04-09 DIAGNOSIS — R51.9 FACIAL PAIN: ICD-10-CM

## 2018-04-09 LAB
ANION GAP SERPL CALCULATED.3IONS-SCNC: 9 MMOL/L (ref 3–14)
BUN SERPL-MCNC: 21 MG/DL (ref 7–30)
CALCIUM SERPL-MCNC: 9.8 MG/DL (ref 8.5–10.1)
CHLORIDE SERPL-SCNC: 105 MMOL/L (ref 94–109)
CHOLEST SERPL-MCNC: 245 MG/DL
CO2 SERPL-SCNC: 24 MMOL/L (ref 20–32)
CREAT SERPL-MCNC: 0.89 MG/DL (ref 0.52–1.04)
GFR SERPL CREATININE-BSD FRML MDRD: 63 ML/MIN/1.7M2
GLUCOSE SERPL-MCNC: 118 MG/DL (ref 70–99)
HBA1C MFR BLD: 6 % (ref 0–6.4)
HDLC SERPL-MCNC: 84 MG/DL
LDLC SERPL CALC-MCNC: 138 MG/DL
NONHDLC SERPL-MCNC: 161 MG/DL
POTASSIUM SERPL-SCNC: 4.6 MMOL/L (ref 3.4–5.3)
SODIUM SERPL-SCNC: 138 MMOL/L (ref 133–144)
TRIGL SERPL-MCNC: 114 MG/DL

## 2018-04-09 PROCEDURE — 83036 HEMOGLOBIN GLYCOSYLATED A1C: CPT | Performed by: FAMILY MEDICINE

## 2018-04-09 PROCEDURE — G0439 PPPS, SUBSEQ VISIT: HCPCS | Performed by: FAMILY MEDICINE

## 2018-04-09 PROCEDURE — 90471 IMMUNIZATION ADMIN: CPT | Performed by: FAMILY MEDICINE

## 2018-04-09 PROCEDURE — 36415 COLL VENOUS BLD VENIPUNCTURE: CPT | Performed by: FAMILY MEDICINE

## 2018-04-09 PROCEDURE — 90670 PCV13 VACCINE IM: CPT | Performed by: FAMILY MEDICINE

## 2018-04-09 PROCEDURE — 80061 LIPID PANEL: CPT | Performed by: FAMILY MEDICINE

## 2018-04-09 PROCEDURE — 80048 BASIC METABOLIC PNL TOTAL CA: CPT | Performed by: FAMILY MEDICINE

## 2018-04-09 RX ORDER — LISINOPRIL 10 MG/1
10 TABLET ORAL DAILY
Qty: 90 TABLET | Refills: 1 | Status: SHIPPED | OUTPATIENT
Start: 2018-04-09 | End: 2018-08-26

## 2018-04-09 RX ORDER — SERTRALINE HYDROCHLORIDE 100 MG/1
150 TABLET, FILM COATED ORAL DAILY
Qty: 135 TABLET | Refills: 1 | Status: SHIPPED | OUTPATIENT
Start: 2018-04-09 | End: 2018-11-19

## 2018-04-09 RX ORDER — NICOTINE POLACRILEX 4 MG/1
20 GUM, CHEWING ORAL PRN
Qty: 90 TABLET | Refills: 3 | Status: SHIPPED | OUTPATIENT
Start: 2018-04-09 | End: 2018-12-07

## 2018-04-09 RX ORDER — PRAVASTATIN SODIUM 40 MG
40 TABLET ORAL DAILY
Qty: 90 TABLET | Refills: 3 | Status: SHIPPED | OUTPATIENT
Start: 2018-04-09 | End: 2019-02-11

## 2018-04-09 RX ORDER — AMLODIPINE BESYLATE 10 MG/1
10 TABLET ORAL DAILY
Qty: 90 TABLET | Refills: 1 | Status: SHIPPED | OUTPATIENT
Start: 2018-04-09 | End: 2018-08-26

## 2018-04-09 RX ORDER — SERTRALINE HYDROCHLORIDE 25 MG/1
TABLET, FILM COATED ORAL
Qty: 180 TABLET | Refills: 3 | Status: CANCELLED | OUTPATIENT
Start: 2018-04-09

## 2018-04-09 ASSESSMENT — ACTIVITIES OF DAILY LIVING (ADL)
I_NEED_ASSISTANCE_FOR_THE_FOLLOWING_DAILY_ACTIVITIES:: NO ASSISTANCE IS NEEDED
CURRENT_FUNCTION: NO ASSISTANCE NEEDED

## 2018-04-09 NOTE — MR AVS SNAPSHOT
After Visit Summary   4/9/2018    Nannette Awad    MRN: [id number]           Patient Information     Date Of Birth          1955        Visit Information        Provider Department      4/9/2018 10:00 AM Joy Dickson MD [facility]        Today's Diagnoses     Encounter for routine adult health examination without abnormal findings    -  1    Hypertension goal BP (blood pressure) < 150/90        Hyperlipidemia LDL goal <130        Elevated hemoglobin A1c        Facial pain        Gastroesophageal reflux disease without esophagitis        Asymptomatic postmenopausal status        Visit for screening mammogram        Need for prophylactic vaccination against Streptococcus pneumoniae (pneumococcus)          Care Instructions      Check with insurance if Shingrix is covered, is it less expensive at a pharmacy vs at the doctor's office.    Preventive Health Recommendations    Female Ages 65 +    Yearly exam:     See your health care provider every year in order to  o Review health changes.   o Discuss preventive care.    o Review your medicines if your doctor has prescribed any.      You no longer need a yearly Pap test unless you've had an abnormal Pap test in the past 10 years. If you have vaginal symptoms, such as bleeding or discharge, be sure to talk with your provider about a Pap test.      Every 1 to 2 years, have a mammogram.  If you are over 69, talk with your health care provider about whether or not you want to continue having screening mammograms.      Every 10 years, have a colonoscopy. Or, have a yearly FIT test (stool test). These exams will check for colon cancer.       Have a cholesterol test every 5 years, or more often if your doctor advises it.       Have a diabetes test (fasting glucose) every three years. If you are at risk for diabetes, you should have this test more often.       At age 65, have a bone density scan (DEXA) to check for osteoporosis (brittle  bone disease).    Shots:    Get a flu shot each year.    Get a tetanus shot every 10 years.    Talk to your doctor about your pneumonia vaccines. There are now two you should receive - Pneumovax (PPSV 23) and Prevnar (PCV 13).    Talk to your doctor about the shingles vaccine.    Talk to your doctor about the hepatitis B vaccine.    Nutrition:     Eat at least 5 servings of fruits and vegetables each day.      Eat whole-grain bread, whole-wheat pasta and brown rice instead of white grains and rice.      Talk to your provider about Calcium and Vitamin D.     Lifestyle    Exercise at least 150 minutes a week (30 minutes a day, 5 days a week). This will help you control your weight and prevent disease.      Limit alcohol to one drink per day.      No smoking.       Wear sunscreen to prevent skin cancer.       See your dentist twice a year for an exam and cleaning.      See your eye doctor every 1 to 2 years to screen for conditions such as glaucoma, macular degeneration and cataracts.          Follow-ups after your visit        Future tests that were ordered for you today     Open Future Orders        Priority Expected Expires Ordered    DEXA HIP/PELVIS/SPINE - Future Routine  4/5/2019 4/9/2018    MA SCREENING DIGITAL BILAT - Future  (s+30) Routine  4/5/2019 4/9/2018            Who to contact     If you have questions or need follow up information about today's clinic visit or your schedule please contact [facility] directly at [phone].  Normal or non-critical lab and imaging results will be communicated to you by MyChart, letter or phone within 4 business days after the clinic has received the results. If you do not hear from us within 7 days, please contact the clinic through MyChart or phone. If you have a critical or abnormal lab result, we will notify you by phone as soon as possible.  Submit refill requests through [employer] or call your pharmacy and they will forward the refill request to us.  "Please allow 3 business days for your refill to be completed.          Additional Information About Your Visit        [employer] Information     [facility] gives you secure access to your electronic health record. If you see a primary care provider, you can also send messages to your care team and make appointments. If you have questions, please call your primary care clinic.  If you do not have a primary care provider, please call [phone] and they will assist you.        Care EveryWhere ID     This is your Care EveryWhere ID. This could be used by other organizations to access your [facility] medical records  [id number]        Your Vitals Were     Pulse Temperature Respirations Height Pulse Oximetry BMI (Body Mass Index)    79 98.6  F (37  C) (Oral) 24 5' 4\" (1.626 m) 97% 30.86 kg/m2       Blood Pressure from Last 3 Encounters:   04/09/18 136/66   05/06/17 137/57   03/23/17 136/78    Weight from Last 3 Encounters:   04/09/18 179 lb 12.8 oz (81.6 kg)   03/23/17 182 lb (82.6 kg)   02/06/17 180 lb (81.6 kg)              We Performed the Following     BASIC METABOLIC PANEL     Hemoglobin A1c     Lipid panel reflex to direct LDL Fasting     Pneumococcal vaccine 13 valent PCV13 IM (Prevnar) [[id number]]          Today's Medication Changes          These changes are accurate as of 4/9/18 10:21 AM.  If you have any questions, ask your nurse or doctor.               These medicines have changed or have updated prescriptions.        Dose/Directions    amLODIPine 10 MG tablet   Commonly known as:  NORVASC   This may have changed:  See the new instructions.   Used for:  Hypertension goal BP (blood pressure) < 150/90   Changed by:  Joy Dickson MD        Dose:  10 mg   Take 1 tablet (10 mg) by mouth daily   Quantity:  90 tablet   Refills:  1       lisinopril 10 MG tablet   Commonly known as:  PRINIVIL/ZESTRIL   This may have changed:  See the new instructions.   Used for:  Hypertension goal BP (blood pressure) < 150/90 "   Changed by:  Joy Dickson MD        Dose:  10 mg   Take 1 tablet (10 mg) by mouth daily   Quantity:  90 tablet   Refills:  1       * sertraline 25 MG tablet   Commonly known as:  ZOLOFT   This may have changed:    - Another medication with the same name was changed. Make sure you understand how and when to take each.  - Another medication with the same name was removed. Continue taking this medication, and follow the directions you see here.   Used for:  Facial pain   Changed by:  Joy Dickson MD        Take 50mg daily[combine with previously prescribed 100mg daily-separate prescription]   Quantity:  180 tablet   Refills:  3       * sertraline 100 MG tablet   Commonly known as:  ZOLOFT   This may have changed:    - how much to take  - Another medication with the same name was removed. Continue taking this medication, and follow the directions you see here.   Used for:  Facial pain   Changed by:  Joy Dickson MD        Dose:  150 mg   Take 1.5 tablets (150 mg) by mouth daily   Quantity:  135 tablet   Refills:  1       * Notice:  This list has 2 medication(s) that are the same as other medications prescribed for you. Read the directions carefully, and ask your doctor or other care provider to review them with you.         Where to get your medicines      These medications were sent to [facility] [place], [place] - [address] [facility]  [address] [facility] [address]     Phone:  [phone]     amLODIPine 10 MG tablet    lisinopril 10 MG tablet    omeprazole 20 MG tablet    pravastatin 40 MG tablet    sertraline 100 MG tablet                Primary Care Provider Office Phone # Fax #    Joy Dickson [phone] [address] [facility] [address]        Equal Access to Services     JOSE REN : Zi Leiva, marla damon, bandar davidson. So [facility]  [phone].    ATENCIÓN: Si eldon puentes, tiene a vo disposición servicios gratuitos de asistencia lingüística. Naveen murray [phone].    We comply with applicable federal civil rights laws and [place] laws. We do not discriminate on the basis of race, color, national origin, age, disability, sex, sexual orientation, or gender identity.            Thank you!     [facility] you [facility]  [facility]. Our goal is always to provide you with excellent care. Hearing back from our patients is one way we can continue to improve our services. Please take a few minutes to complete the written survey that you may receive in the mail after your visit with us. Thank you!             Your Updated Medication List - Protect others around you: Learn how to safely use, store and throw away your medicines at www.disposemymeds.org.          This list is accurate as of 4/9/18 10:21 AM.  Always use your most recent med list.                   Brand Name Dispense Instructions for use Diagnosis    amLODIPine 10 MG tablet    NORVASC    90 tablet    Take 1 tablet (10 mg) by mouth daily    Hypertension goal BP (blood pressure) < 150/90       aspirin 81 MG tablet      Take 1 tablet by mouth daily.        BENADRYL 25 MG tablet   Generic drug:  diphenhydrAMINE      Take 25 mg by mouth. As needed        CALCIUM 500 + D PO      Take  by mouth. One tab three time a day  .        cholecalciferol 1000 UNIT tablet    vitamin D3     1 TABLET DAILY        FISH OIL      daily.        lisinopril 10 MG tablet    PRINIVIL/ZESTRIL    90 tablet    Take 1 tablet (10 mg) by mouth daily    Hypertension goal BP (blood pressure) < 150/90       MULTI-DAY PO      daily        omeprazole 20 MG tablet     90 tablet    Take 1 tablet (20 mg) by mouth as needed Take 30-60 minutes before a meal.    Gastroesophageal reflux disease without esophagitis       pravastatin 40 MG tablet    PRAVACHOL    90 tablet    Take 1 tablet (40 mg) by mouth  daily    Hyperlipidemia LDL goal <130       rizatriptan 10 MG ODT tab    MAXALT-MLT    25 tablet    Take 1 tablet (10 mg) by mouth See Admin Instructions WITH ONSET OF MIGRAINE, MAY REPEAT ONCE AFTER 2 HOURS. DO NOT EXCEED 3 TABLETS IN 24 HOURS.    Migraine without status migrainosus, not intractable, unspecified migraine type       * sertraline 25 MG tablet    ZOLOFT    180 tablet    Take 50mg daily[combine with previously prescribed 100mg daily-separate prescription]    Facial pain       * sertraline 100 MG tablet    ZOLOFT    135 tablet    Take 1.5 tablets (150 mg) by mouth daily    Facial pain       * Notice:  This list has 2 medication(s) that are the same as other medications prescribed for you. Read the directions carefully, and ask your doctor or other care provider to review them with you.

## 2018-04-09 NOTE — NURSING NOTE
Screening Questionnaire for Adult Immunization    Are you sick today?   No   Do you have allergies to medications, food, a vaccine component or latex?   No   Have you ever had a serious reaction after receiving a vaccination?   No   Do you have a long-term health problem with heart disease, lung disease, asthma, kidney disease, metabolic disease (e.g. diabetes), anemia, or other blood disorder?   No   Do you have cancer, leukemia, HIV/AIDS, or any other immune system problem?   No   In the past 3 months, have you taken medications that affect  your immune system, such as prednisone, other steroids, or anticancer drugs; drugs for the treatment of rheumatoid arthritis, Crohn s disease, or psoriasis; or have you had radiation treatments?   No   Have you had a seizure, or a brain or other nervous system problem?   No   During the past year, have you received a transfusion of blood or blood     products, or been given immune (gamma) globulin or antiviral drug?   No   For women: Are you pregnant or is there a chance you could become        pregnant during the next month?   No   Have you received any vaccinations in the past 4 weeks?   No     Immunization questionnaire answers were all negative.        Per orders of Dr. Dickson, injection of Prevnar given by Cynthia Horvath. Patient instructed to remain in clinic for 15 minutes afterwards, and to report any adverse reaction to me immediately.       Screening performed by Cynthia Horvath on 4/9/2018 at 10:32 AM.

## 2018-05-21 ENCOUNTER — TELEPHONE (OUTPATIENT)
Dept: FAMILY MEDICINE | Facility: CLINIC | Age: 63
End: 2018-05-21

## 2018-05-21 ENCOUNTER — TRANSFERRED RECORDS (OUTPATIENT)
Dept: HEALTH INFORMATION MANAGEMENT | Facility: CLINIC | Age: 63
End: 2018-05-21

## 2018-05-21 NOTE — TELEPHONE ENCOUNTER
Patient is over at [facility], didn't bring order forms for dexa scan, Hanna is calling to ask if it can be faxed.   Fax number [phone]

## 2018-05-21 NOTE — TELEPHONE ENCOUNTER
Spoke with Hanna at [facility] told her I will fax the orders over but to advise patient did she check with Insurance before having this Dexa scan done. Orders were faxed [phone]  MARLENE Whipple

## 2018-07-01 ENCOUNTER — MYC MEDICAL ADVICE (OUTPATIENT)
Dept: FAMILY MEDICINE | Facility: CLINIC | Age: 63
End: 2018-07-01

## 2018-08-12 ENCOUNTER — MYC MEDICAL ADVICE (OUTPATIENT)
Dept: FAMILY MEDICINE | Facility: CLINIC | Age: 63
End: 2018-08-12

## 2018-08-12 DIAGNOSIS — E55.9 VITAMIN D DEFICIENCY: Primary | ICD-10-CM

## 2018-08-13 NOTE — TELEPHONE ENCOUNTER
Per protocol, will route encounter to be cosigned by provider for Verbal Orders.  Catherine Cornejo RN

## 2018-08-14 PROBLEM — E55.9 VITAMIN D DEFICIENCY: Status: ACTIVE | Noted: 2018-08-14

## 2018-08-26 DIAGNOSIS — I10 HYPERTENSION GOAL BP (BLOOD PRESSURE) < 150/90: ICD-10-CM

## 2018-08-28 RX ORDER — LISINOPRIL 10 MG/1
TABLET ORAL
Qty: 90 TABLET | Refills: 0 | Status: SHIPPED | OUTPATIENT
Start: 2018-08-28 | End: 2018-12-31

## 2018-08-28 RX ORDER — AMLODIPINE BESYLATE 10 MG/1
TABLET ORAL
Qty: 90 TABLET | Refills: 0 | Status: SHIPPED | OUTPATIENT
Start: 2018-08-28 | End: 2018-12-31

## 2018-08-28 NOTE — TELEPHONE ENCOUNTER
"Medication is being filled for 1 time refill only due to:  Patient needs lab appointment and blood pressure check at clinic pharmacy in October. See 4/9/18 office visit plan (HTN).    Requested Prescriptions   Pending Prescriptions Disp Refills     amLODIPine (NORVASC) 10 MG tablet [Pharmacy Med Name: AMLODIPINE 10MG TAB] 90 tablet 1     Sig: TAKE 1 TABLET BY MOUTH ONCE DAILY    Calcium Channel Blockers Protocol  Passed    8/26/2018  1:07 PM       Passed - Blood pressure under 140/90 in past 12 months    BP Readings from Last 3 Encounters:   04/09/18 136/66   05/06/17 137/57   03/23/17 136/78          Passed - Recent (12 mo) or future (30 days) visit within the authorizing provider's specialty    Patient had office visit in the last 12 months or has a visit in the next 30 days with authorizing provider or within the authorizing provider's specialty.  See \"Patient Info\" tab in inbasket, or \"Choose Columns\" in Meds & Orders section of the refill encounter.         Passed - Patient is age 18 or older       Passed - No active pregnancy on record       Passed - Normal serum creatinine on file in past 12 months    Recent Labs   Lab Test  04/09/18   1047   CR  0.89            Passed - No positive pregnancy test in past 12 months        lisinopril (PRINIVIL/ZESTRIL) 10 MG tablet [Pharmacy Med Name: LISINOPRIL 10MG  TAB] 90 tablet 1     Sig: TAKE 1 TABLET BY MOUTH ONCE DAILY    ACE Inhibitors (Including Combos) Protocol Passed    8/26/2018  1:07 PM       Passed - Blood pressure under 140/90 in past 12 months    BP Readings from Last 3 Encounters:   04/09/18 136/66   05/06/17 137/57   03/23/17 136/78          Passed - Recent (12 mo) or future (30 days) visit within the authorizing provider's specialty    Patient had office visit in the last 12 months or has a visit in the next 30 days with authorizing provider or within the authorizing provider's specialty.  See \"Patient Info\" tab in inbasket, or \"Choose Columns\" in Meds & " Orders section of the refill encounter.           Passed - Patient is age 18 or older       Passed - No active pregnancy on record       Passed - Normal serum creatinine on file in past 12 months    Recent Labs   Lab Test  04/09/18   1047   CR  0.89          Passed - Normal serum potassium on file in past 12 months    Recent Labs   Lab Test  04/09/18   1047   POTASSIUM  4.6

## 2018-11-19 DIAGNOSIS — R51.9 FACIAL PAIN: ICD-10-CM

## 2018-11-19 RX ORDER — SERTRALINE HYDROCHLORIDE 100 MG/1
TABLET, FILM COATED ORAL
Qty: 135 TABLET | Refills: 1 | Status: SHIPPED | OUTPATIENT
Start: 2018-11-19 | End: 2019-05-21

## 2018-12-07 ENCOUNTER — OFFICE VISIT (OUTPATIENT)
Dept: URGENT CARE | Facility: RETAIL CLINIC | Age: 63
End: 2018-12-07
Payer: COMMERCIAL

## 2018-12-07 VITALS
TEMPERATURE: 98.7 F | OXYGEN SATURATION: 100 % | DIASTOLIC BLOOD PRESSURE: 80 MMHG | HEART RATE: 64 BPM | SYSTOLIC BLOOD PRESSURE: 150 MMHG

## 2018-12-07 DIAGNOSIS — J06.9 VIRAL URI WITH COUGH: ICD-10-CM

## 2018-12-07 DIAGNOSIS — J11.1 INFLUENZA-LIKE ILLNESS: Primary | ICD-10-CM

## 2018-12-07 PROCEDURE — 99203 OFFICE O/P NEW LOW 30 MIN: CPT | Performed by: PHYSICIAN ASSISTANT

## 2018-12-07 RX ORDER — CODEINE PHOSPHATE AND GUAIFENESIN 10; 100 MG/5ML; MG/5ML
1-2 SOLUTION ORAL EVERY 4 HOURS PRN
Qty: 120 ML | Refills: 0 | Status: SHIPPED | OUTPATIENT
Start: 2018-12-07 | End: 2019-02-11

## 2018-12-07 RX ORDER — INFLUENZA VACCINE, ADJUVANTED 15; 15; 15 UG/.5ML; UG/.5ML; UG/.5ML
INJECTION, SUSPENSION INTRAMUSCULAR
COMMUNITY
Start: 2018-12-03 | End: 2018-12-07

## 2018-12-07 NOTE — PATIENT INSTRUCTIONS
Robitussin with codeine as needed for cough before sleep, up to every 4-6 hours. DO NOT take before driving a vehicle.  Discussed that after 48 hours of sxs, recommendations are not to place patient on Tamiflu.   Remain out of work/school when still symptomatic. Note given.  Stay home for 24 hours after your fever goes away.  Rest! Your body needs more rest to heal.  Drink plenty of fluids (warm fluids like tea or soup are soothing and reduce cough)  Sit in the bathroom with a hot shower running and breathe in the steam.  Honey may soothe your sore throat and help manage your cough- may take straight or in warm water with lemon juice.  Delsym (dextromethorphan polistirex) is an over the counter cough medication that lasts 12 hours.   Mucinex or Robitussin (guiafenesin) thin mucus and may help it to loosen more quickly  Take Tylenol or an NSAID such as ibuprofen or naproxen as needed for pain.  Good handwashing is the best way to prevent spread of germs  Present to emergency room if you develop trouble breathing, swallowing or cough-up blood.  Follow up with your primary care provider if symptoms worsen or fail to improve as expected.

## 2018-12-07 NOTE — MR AVS SNAPSHOT
After Visit Summary   12/7/2018    Nannette Awad    MRN: [id number]           Patient Information     Date Of Birth          1955        Visit Information        Provider Department      12/7/2018 4:50 PM Francine Hart PA-C [facility]        Today's Diagnoses     Viral URI with cough    -  1    Influenza-like illness          Care Instructions    Robitussin with codeine as needed for cough before sleep, up to every 4-6 hours. DO NOT take before driving a vehicle.  Discussed that after 48 hours of sxs, recommendations are not to place patient on Tamiflu.   Remain out of work/school when still symptomatic. Note given.  Stay home for 24 hours after your fever goes away.  Rest! Your body needs more rest to heal.  Drink plenty of fluids (warm fluids like tea or soup are soothing and reduce cough)  Sit in the bathroom with a hot shower running and breathe in the steam.  Honey may soothe your sore throat and help manage your cough- may take straight or in warm water with lemon juice.  Delsym (dextromethorphan polistirex) is an over the counter cough medication that lasts 12 hours.   Mucinex or Robitussin (guiafenesin) thin mucus and may help it to loosen more quickly  Take Tylenol or an NSAID such as ibuprofen or naproxen as needed for pain.  Good handwashing is the best way to prevent spread of germs  Present to emergency room if you develop trouble breathing, swallowing or cough-up blood.  Follow up with your primary care provider if symptoms worsen or fail to improve as expected.          Follow-ups after your visit        Who to contact     You can reach your care team any time of the day by calling [phone].  Notification of test results:  If you have an abnormal lab result, we will notify you by phone as soon as possible.         Additional Information About Your Visit        MyChart Information     [facility] gives you secure access to your electronic health record.  If you see a primary care provider, you can also send messages to your care team and make appointments. If you have questions, please call your primary care clinic.  If you do not have a primary care provider, please call [phone] and they will assist you.        Care EveryWhere ID     This is your Care EveryWhere ID. This could be used by other organizations to access your [place] medical records  [id number]        Your Vitals Were     Pulse Temperature Pulse Oximetry             64 98.7  F (37.1  C) (Oral) 100%          Blood Pressure from Last 3 Encounters:   12/07/18 150/80   04/09/18 136/66   05/06/17 137/57    Weight from Last 3 Encounters:   04/09/18 179 lb 12.8 oz (81.6 kg)   03/23/17 182 lb (82.6 kg)   02/06/17 180 lb (81.6 kg)              Today, you had the following     No orders found for display         Today's Medication Changes          These changes are accurate as of 12/7/18  5:20 PM.  If you have any questions, ask your nurse or doctor.               Start taking these medicines.        Dose/Directions    guaiFENesin-codeine 100-10 MG/5ML solution   Commonly known as:  ROBITUSSIN AC   Used for:  Viral URI with cough, Influenza-like illness        Dose:  1-2 tsp.   Take 5-10 mLs by mouth every 4 hours as needed for cough   Quantity:  120 mL   Refills:  0         Stop taking these medicines if you haven't already. Please contact your care team if you have questions.     BENADRYL 25 MG tablet   Generic drug:  diphenhydrAMINE           FLUAD 0.5 ML Ami   Generic drug:  Influenza Vac A&B Surf Ant Adj                Where to get your medicines      Some of these will need a paper prescription and others can be bought over the counter.  Ask your nurse if you have questions.     Bring a paper prescription for each of these medications     guaiFENesin-codeine 100-10 MG/5ML solution                Primary Care Provider Office Phone # Fax #    Joy Dickson [phone]        [address] [facility] [address]        Equal Access to Services     JOSE REN : Hadii domonique ku hadbijan Sovivienneali, waaxda luqadaha, qaybta kaalmada nate, waxnirmal mariin hayaabetty mossandrea gossnury ibrahim. So [facility] [phone].    ATENCIÓN: Si habla español, tiene a vo disposición servicios gratuitos de asistencia lingüística. [facility] [phone].    We comply with applicable federal civil rights laws and [place] laws. We do not discriminate on the basis of race, color, national origin, age, disability, sex, sexual orientation, or gender identity.            Thank you!     Thank you for choosing [facility]  for your care. Our goal is always to provide you with excellent care. Hearing back from our patients is one way we can continue to improve our services. Please take a few minutes to complete the written survey that you may receive in the mail after your visit with us. Thank you!             Your Updated Medication List - Protect others around you: Learn how to safely use, store and throw away your medicines at www.disposemymeds.org.          This list is accurate as of 12/7/18  5:20 PM.  Always use your most recent med list.                   Brand Name Dispense Instructions for use Diagnosis    amLODIPine 10 MG tablet    NORVASC    90 tablet    TAKE 1 TABLET BY MOUTH ONCE DAILY    Hypertension goal BP (blood pressure) < 150/90       aspirin 81 MG tablet    ASA     Take 1 tablet by mouth daily.        CALCIUM 500 + D PO      Take  by mouth. One tab three time a day  .        FISH OIL      daily.        guaiFENesin-codeine 100-10 MG/5ML solution    ROBITUSSIN AC    120 mL    Take 5-10 mLs by mouth every 4 hours as needed for cough    Viral URI with cough, Influenza-like illness       lisinopril 10 MG tablet    PRINIVIL/ZESTRIL    90 tablet    TAKE 1 TABLET BY MOUTH ONCE DAILY    Hypertension goal BP (blood pressure) < 150/90       MULTI-DAY PO      daily        omeprazole 20 MG   capsule    priLOSEC          pravastatin 40 MG tablet    PRAVACHOL    90 tablet    Take 1 tablet (40 mg) by mouth daily    Hyperlipidemia LDL goal <130       rizatriptan 10 MG ODT    MAXALT-MLT    25 tablet    Take 1 tablet (10 mg) by mouth See Admin Instructions WITH ONSET OF MIGRAINE, MAY REPEAT ONCE AFTER 2 HOURS. DO NOT EXCEED 3 TABLETS IN 24 HOURS.    Migraine without status migrainosus, not intractable, unspecified migraine type       * sertraline 25 MG tablet    ZOLOFT    180 tablet    Take 50mg daily[combine with previously prescribed 100mg daily-separate prescription]    Facial pain       * sertraline 100 MG tablet    ZOLOFT    135 tablet    TAKE 1 & 1/2 (ONE & ONE-HALF) TABLETS BY MOUTH ONCE DAILY    Facial pain       vitamin D3 1000 units (25 mcg) tablet    CHOLECALCIFEROL     1 TABLET DAILY        * Notice:  This list has 2 medication(s) that are the same as other medications prescribed for you. Read the directions carefully, and ask your doctor or other care provider to review them with you.

## 2018-12-07 NOTE — LETTER
[facility]  [address] [facility] [address]      December 7, 2018    Nannette Camps  [address] [facility] [address]        To whom it may concern:    Corrina was seen and evaluated today at our clinic and diagnosed with an acute illness. Please excuse from work missed due to this illness. Patient must be free of fever, body aches and chills for 24 hrs before returning to work.        Sincerely,        Francine Hart PA-C

## 2018-12-07 NOTE — PROGRESS NOTES
Chief Complaint   Patient presents with     Cough     4 days; very sore ribs     Throat Pain     Sinus Problem     3 days;  both sides of head around temples, forehead     Otalgia     both     Throat Problem     losing voice     SUBJECTIVE:  Nannette Awad is a 63 year old female here with a chief complaint of feeling sick; cough, fever, body aches and fatigue.  She states onset of symptoms was 4 days ago.    Course of illness is sudden onset and worsening.   Severity moderate  She has had frontal sinus pressure as well as nasal congestion, cough, sore throat, facial pain/pressure, headache and post-nasal drainage. Her ribs hurt from coughing. She also has ear pain and pressure and states she is prone to ear infections. She had a fever up to 102.7F yesterday.  Predisposing factors include none.   Recent treatment has included: OTC meds- ibuprofen 2 hours ago    Past Medical History:   Diagnosis Date     Bursitis of knee 2012    Left leg     Carpal tunnel syndrome 1970    Left wrist, no surgery     Elevated fasting glucose      GERD (gastroesophageal reflux disease)     EGD      HSIL (high grade squamous intraepithelial lesion) on Pap smear of cervix 2006    s/p hyst      HTN (hypertension)      Hyperlipidemia LDL goal < 130 2002     Insomnia 1995     Left sided sciatica 2012     Migraine      Mitral (valve) prolapse      Obesity 4/16/2014     Osteopenia 2000     Psoriasis      Raynaud's disease      Tubular adenoma      Vitamin D deficiency 2008     Current Outpatient Prescriptions   Medication Sig Dispense Refill     amLODIPine (NORVASC) 10 MG tablet TAKE 1 TABLET BY MOUTH ONCE DAILY 90 tablet 0     aspirin 81 MG tablet Take 1 tablet by mouth daily.       Calcium Carbonate-Vitamin D (CALCIUM 500 + D PO) Take  by mouth. One tab three time a day  .        FISH OIL daily.       guaiFENesin-codeine (ROBITUSSIN AC) 100-10 MG/5ML solution Take 5-10 mLs by mouth every 4 hours as needed for cough 120 mL 0     lisinopril  (PRINIVIL/ZESTRIL) 10 MG tablet TAKE 1 TABLET BY MOUTH ONCE DAILY 90 tablet 0     MULTI-DAY OR daily       omeprazole (PRILOSEC) 20 MG DR capsule        pravastatin (PRAVACHOL) 40 MG tablet Take 1 tablet (40 mg) by mouth daily 90 tablet 3     sertraline (ZOLOFT) 100 MG tablet TAKE 1 & 1/2 (ONE & ONE-HALF) TABLETS BY MOUTH ONCE DAILY 135 tablet 1     sertraline (ZOLOFT) 25 MG tablet Take 50mg daily[combine with previously prescribed 100mg daily-separate prescription] 180 tablet 3     VITAMIN D 1000 UNIT OR TABS 1 TABLET DAILY       rizatriptan (MAXALT-MLT) 10 MG disintegrating tablet Take 1 tablet (10 mg) by mouth See Admin Instructions WITH ONSET OF MIGRAINE, MAY REPEAT ONCE AFTER 2 HOURS. DO NOT EXCEED 3 TABLETS IN 24 HOURS. (Patient not taking: Reported on 12/7/2018) 25 tablet 3     Social History   Substance Use Topics     Smoking status: Never Smoker     Smokeless tobacco: Never Used     Alcohol use No     Allergies   Allergen Reactions     Atorvastatin Calcium      cramps     Sulfa Drugs Rash     REVIEW OF SYSTEMS  General: POSITIVE for fever, chills, headaches, fatigue.  Eyes: NEGATIVE for redness, tearing, itching.  ENT: POSITIVE for nasal congestion, sore throat, post nasal drip and ear fullness.  Resp:POSITIVE for cough. NEGATIVE for wheezing and SOB.    OBJECTIVE:  /80 (BP Location: Left arm)  Pulse 64  Temp 98.7  F (37.1  C) (Oral)  SpO2 100%  GENERAL APPEARANCE: healthy, alert and no distress  EYES: PERRL, conjunctiva clear  HENT: Pain with palpation over frontal and maxillary sinuses. Ear canals normal bilaterally. Right TM with a serous effusion and visible air fluid level, in neutral position without erythema. Left TM normal. Nasal turbinates normal bilaterally. Posterior pharynx is not erythematous.  NECK: supple, nontender, no lymphadenopathy  RESP: lungs clear to auscultation - no rales, rhonchi or wheezes  CV: regular rates and rhythm, normal S1 S2, no murmur noted    ASSESSMENT:     ICD-10-CM    1. Influenza-like illness R69 guaiFENesin-codeine (ROBITUSSIN AC) 100-10 MG/5ML solution   2. Viral URI with cough J06.9 guaiFENesin-codeine (ROBITUSSIN AC) 100-10 MG/5ML solution    B97.89      Discussed flu testing but treatment will not change as she has had symptoms for 4 days. She opted to skip the test today.    PLAN:   Patient Instructions   Robitussin with codeine as needed for cough before sleep, up to every 4-6 hours. DO NOT take before driving a vehicle.  Discussed that after 48 hours of sxs, recommendations are not to place patient on Tamiflu.   Remain out of work/school when still symptomatic. Note given.  Stay home for 24 hours after your fever goes away.  Rest! Your body needs more rest to heal.  Drink plenty of fluids (warm fluids like tea or soup are soothing and reduce cough)  Sit in the bathroom with a hot shower running and breathe in the steam.  Honey may soothe your sore throat and help manage your cough- may take straight or in warm water with lemon juice.  Delsym (dextromethorphan polistirex) is an over the counter cough medication that lasts 12 hours.   Mucinex or Robitussin (guiafenesin) thin mucus and may help it to loosen more quickly  Take Tylenol or an NSAID such as ibuprofen or naproxen as needed for pain.  Good handwashing is the best way to prevent spread of germs  Present to emergency room if you develop trouble breathing, swallowing or cough-up blood.  Follow up with your primary care provider if symptoms worsen or fail to improve as expected.    Follow up with primary care provider with any problems, questions or concerns or if symptoms worsen or fail to improve. Patient agreed to plan and verbalized understanding.    Magdalene Hart PA-C  [facility] [place] [facility]

## 2018-12-17 ENCOUNTER — TELEPHONE (OUTPATIENT)
Dept: FAMILY MEDICINE | Facility: CLINIC | Age: 63
End: 2018-12-17

## 2018-12-17 DIAGNOSIS — I10 HYPERTENSION GOAL BP (BLOOD PRESSURE) < 150/90: ICD-10-CM

## 2018-12-20 NOTE — TELEPHONE ENCOUNTER
Informed patient Needs BP check with either pharmacy or ancillary nurse. Also needs non-fasting labs before she can get refill om medications. Gave [phone]. Patient to call back when not working.  MARLENE Whipple

## 2018-12-24 RX ORDER — LISINOPRIL 10 MG/1
TABLET ORAL
Qty: 90 TABLET | Refills: 0 | OUTPATIENT
Start: 2018-12-24

## 2018-12-24 RX ORDER — AMLODIPINE BESYLATE 10 MG/1
TABLET ORAL
Qty: 90 TABLET | Refills: 0 | OUTPATIENT
Start: 2018-12-24

## 2018-12-31 RX ORDER — LISINOPRIL 10 MG/1
10 TABLET ORAL DAILY
Qty: 10 TABLET | Refills: 0 | Status: SHIPPED | OUTPATIENT
Start: 2018-12-31 | End: 2019-01-07

## 2018-12-31 RX ORDER — AMLODIPINE BESYLATE 10 MG/1
10 TABLET ORAL DAILY
Qty: 10 TABLET | Refills: 0 | Status: SHIPPED | OUTPATIENT
Start: 2018-12-31 | End: 2019-01-07

## 2018-12-31 NOTE — TELEPHONE ENCOUNTER
Left voicemail message informing patient prescription has been sent for 10 days.   Catherine Cornejo RN

## 2018-12-31 NOTE — TELEPHONE ENCOUNTER
Patient has ancillary appointment made for 1/7/19. RN, can medication be refilled?Charo Fitzpatrick MA/TC

## 2019-01-07 ENCOUNTER — ALLIED HEALTH/NURSE VISIT (OUTPATIENT)
Dept: NURSING | Facility: CLINIC | Age: 64
End: 2019-01-07
Payer: COMMERCIAL

## 2019-01-07 VITALS — SYSTOLIC BLOOD PRESSURE: 132 MMHG | OXYGEN SATURATION: 97 % | DIASTOLIC BLOOD PRESSURE: 80 MMHG | HEART RATE: 72 BPM

## 2019-01-07 DIAGNOSIS — I10 HYPERTENSION GOAL BP (BLOOD PRESSURE) < 150/90: Primary | ICD-10-CM

## 2019-01-07 DIAGNOSIS — E55.9 VITAMIN D DEFICIENCY: ICD-10-CM

## 2019-01-07 DIAGNOSIS — I10 HYPERTENSION GOAL BP (BLOOD PRESSURE) < 150/90: ICD-10-CM

## 2019-01-07 LAB
ANION GAP SERPL CALCULATED.3IONS-SCNC: 8 MMOL/L (ref 3–14)
BUN SERPL-MCNC: 24 MG/DL (ref 7–30)
CALCIUM SERPL-MCNC: 9.7 MG/DL (ref 8.5–10.1)
CHLORIDE SERPL-SCNC: 104 MMOL/L (ref 94–109)
CO2 SERPL-SCNC: 26 MMOL/L (ref 20–32)
CREAT SERPL-MCNC: 0.86 MG/DL (ref 0.52–1.04)
GFR SERPL CREATININE-BSD FRML MDRD: 70 ML/MIN/{1.73_M2}
GLUCOSE SERPL-MCNC: 102 MG/DL (ref 70–99)
POTASSIUM SERPL-SCNC: 4.6 MMOL/L (ref 3.4–5.3)
SODIUM SERPL-SCNC: 138 MMOL/L (ref 133–144)

## 2019-01-07 PROCEDURE — 80048 BASIC METABOLIC PNL TOTAL CA: CPT | Performed by: FAMILY MEDICINE

## 2019-01-07 PROCEDURE — 82306 VITAMIN D 25 HYDROXY: CPT | Performed by: FAMILY MEDICINE

## 2019-01-07 PROCEDURE — 36415 COLL VENOUS BLD VENIPUNCTURE: CPT | Performed by: FAMILY MEDICINE

## 2019-01-07 PROCEDURE — 99207 ZZC NO CHARGE NURSE ONLY: CPT

## 2019-01-07 RX ORDER — LISINOPRIL 10 MG/1
10 TABLET ORAL DAILY
Qty: 90 TABLET | Refills: 1 | Status: SHIPPED | OUTPATIENT
Start: 2019-01-07 | End: 2019-04-10

## 2019-01-07 RX ORDER — AMLODIPINE BESYLATE 10 MG/1
10 TABLET ORAL DAILY
Qty: 90 TABLET | Refills: 1 | Status: SHIPPED | OUTPATIENT
Start: 2019-01-07 | End: 2019-04-10

## 2019-01-07 NOTE — PROGRESS NOTES
Nannette Awad is a 63 year old patient who comes in today for a Blood Pressure check.  Initial BP:  /80   Pulse 72   SpO2 97%      72  Disposition: results routed to provider.    BP Readings from Last 3 Encounters:   01/07/19 132/80   12/07/18 150/80   04/09/18 136/66       Iesha Milian MA

## 2019-01-10 LAB
DEPRECATED CALCIDIOL+CALCIFEROL SERPL-MC: <59 UG/L (ref 20–75)
VITAMIN D2 SERPL-MCNC: <5 UG/L
VITAMIN D3 SERPL-MCNC: 54 UG/L

## 2019-02-04 NOTE — PATIENT INSTRUCTIONS
Preventive Health Recommendations    See your health care provider every year to    Review health changes.     Discuss preventive care.      Review your medicines if your doctor has prescribed any.      You no longer need a yearly Pap test unless you've had an abnormal Pap test in the past 10 years. If you have vaginal symptoms, such as bleeding or discharge, be sure to talk with your provider about a Pap test.      Every 1 to 2 years, have a mammogram.  If you are over 69, talk with your health care provider about whether or not you want to continue having screening mammograms.      Every 10 years, have a colonoscopy. Or, have a yearly FIT test (stool test). These exams will check for colon cancer.       Have a cholesterol test every 5 years, or more often if your doctor advises it.       Have a diabetes test (fasting glucose) every three years. If you are at risk for diabetes, you should have this test more often.       At age 65, have a bone density scan (DEXA) to check for osteoporosis (brittle bone disease).    Shots:    Get a flu shot each year.    Get a tetanus shot every 10 years.    Talk to your doctor about your pneumonia vaccines. There are now two you should receive - Pneumovax (PPSV 23) and Prevnar (PCV 13).    Talk to your pharmacist about the shingles vaccine.    Talk to your doctor about the hepatitis B vaccine.    Nutrition:     Eat at least 5 servings of fruits and vegetables each day.      Eat whole-grain bread, whole-wheat pasta and brown rice instead of white grains and rice.      Get adequate Calcium and Vitamin D.     Lifestyle    Exercise at least 150 minutes a week (30 minutes a day, 5 days a week). This will help you control your weight and prevent disease.      Limit alcohol to one drink per day.      No smoking.       Wear sunscreen to prevent skin cancer.       See your dentist twice a year for an exam and cleaning.      See your eye doctor every 1 to 2 years to screen for conditions  such as glaucoma, macular degeneration and cataracts.    Personalized Prevention Plan  You are due for the preventive services outlined below.  Your care team is available to assist you in scheduling these services.  If you have already completed any of these items, please share that information with your care team to update in your medical record.  Health Maintenance Due   Topic Date Due     HIV SCREEN (SYSTEM ASSIGNED)  02/24/1971     Zoster (Shingles) Vaccine (2 of 3) 05/11/2013     Flu Vaccine (1) 09/01/2018     A1C (Diabetes) Lab - every 6 months  10/09/2018     Pap Smear - every 3 years  02/15/2019

## 2019-02-04 NOTE — PROGRESS NOTES
"  SUBJECTIVE:   Nannette Awad is a 63 year old female who presents for Preventive Visit.  {PVP to remind patient that this is not necessarily a physical exam; physical exam may or may not be done:743592::\"click delete button to remove this line now\"}  {PVP to inform patient that additional E&M charge may apply, if additional problems addressed:392170::\"click delete button to remove this line now\"}  Are you in the first 12 months of your Medicare Part B coverage?  { :038610::\"No\"}    Physical Health:    In general, how would you rate your overall physical health? { :829261}    Outside of work, how many days during the week do you exercise? { :714448}    Outside of work, approximately how many minutes a day do you exercise?{ :158090}    If you drink alcohol do you typically have >3 drinks per day or >7 drinks per week? { :063366}    Do you usually eat at least 4 servings of fruit and vegetables a day, include whole grains & fiber and avoid regularly eating high fat or \"junk\" foods? { :303541::\"Yes\"}    Do you have any problems taking medications regularly?  { :301993::\"No\"}    Do you have any side effects from medications? { :525654}    Needs assistance for the following daily activities: { :195043}    Which of the following safety concerns are present in your home?  { :358515::\"none identified\"}     Hearing impairment: { :005261}    In the past 6 months, have you been bothered by leaking of urine? { :151904}    Mental Health:    In general, how would you rate your overall mental or emotional health? { :423741}  PHQ-2 Score:      Do you feel safe in your environment? { :849675}    Do you have a Health Care Directive? { :788072}    Additional concerns to address?  {If YES, notify patient they may be responsible for a copay, coinsurance or deductible if the visit today includes services such as checking on a sore throat, having an x-ray or lab test, or treating and evaluating a new or existing condition " ":421556::\"No\"}    Fall risk:  { :584555}  {If any of the above assessments are answered yes, consider ordering appropriate referrals (Optional):355481::\"click delete button to remove this line now\"}  Cognitive Screening: { :989375}    {Do you have sleep apnea, excessive snoring or daytime drowsiness? (Optional):618998}    {Outside tests to abstract? :877422}    {additional problems to add (Optional):633887}    Reviewed and updated as needed this visit by clinical staff         Reviewed and updated as needed this visit by Provider        Social History     Tobacco Use     Smoking status: Never Smoker     Smokeless tobacco: Never Used   Substance Use Topics     Alcohol use: No                           Current providers sharing in care for this patient include:   Patient Care Team:  Joy Dickson MD as PCP - General (Family Practice)  Joy Dickson MD as PCP - Assigned PCP    The following health maintenance items are reviewed in Epic and correct as of today:  Health Maintenance   Topic Date Due     HIV SCREEN (SYSTEM ASSIGNED)  02/24/1973     ZOSTER IMMUNIZATION (2 of 3) 05/11/2013     INFLUENZA VACCINE (1) 09/01/2018     A1C Q6 MO  10/09/2018     PAP Q3 YR  02/15/2019     COLONOSCOPY Q5 YR  04/08/2019     FALL RISK ASSESSMENT  04/09/2019     LIPID MONITORING Q1 YEAR  04/09/2019     PHQ-2 Q1 YR  04/09/2019     MAMMO Q1 YR  05/21/2019     BMP Q6 MOS  07/07/2019     DTAP/TDAP/TD IMMUNIZATION (3 - Td) 10/15/2020     ADVANCE DIRECTIVE PLANNING Q5 YRS  02/06/2022     DEXA Q5 YR  05/21/2023     MIGRAINE ACTION PLAN  Completed     HEPATITIS C SCREENING  Completed     IPV IMMUNIZATION  Aged Out     MENINGITIS IMMUNIZATION  Aged Out     {Chronicprobdata (Optional):861004}  {Decision Support (Optional):593101}    ROS:  {ROS COMP:816281}    OBJECTIVE:   There were no vitals taken for this visit. Estimated body mass index is 30.86 kg/m  as calculated from the following:    Height as of 4/9/18: 1.626 m (5' 4\").    " "Weight as of 18: 81.6 kg (179 lb 12.8 oz).  EXAM:   {Exam :180938}    {Diagnostic Test Results (Optional):011276::\"Diagnostic Test Results:\",\"none \"}    ASSESSMENT / PLAN:   {Diag Picklist:821001}    End of Life Planning:  Patient currently has an advanced directive: { :862915}    COUNSELING:  {Medicare Counselin}    BP Readings from Last 1 Encounters:   19 132/80     Estimated body mass index is 30.86 kg/m  as calculated from the following:    Height as of 18: 1.626 m (5' 4\").    Weight as of 18: 81.6 kg (179 lb 12.8 oz).    {BP Counseling- Complete if BP >= 120/80  (Optional):566847}  {Weight Management Plan (ACO) Complete if BMI is abnormal-  Ages 18-64  BMI >24.9.  Age 65+ with BMI <23 or >30 (Optional):827015}     reports that  has never smoked. she has never used smokeless tobacco.  {Tobacco Cessation -- Complete if patient is a smoker (Optional):243525}    Appropriate preventive services were discussed with this patient, including applicable screening as appropriate for cardiovascular disease, diabetes, osteopenia/osteoporosis, and glaucoma.  As appropriate for age/gender, discussed screening for colorectal cancer, prostate cancer, breast cancer, and cervical cancer. Checklist reviewing preventive services available has been given to the patient.    Reviewed patients plan of care and provided an AVS. The {CarePlan:914782} for Nannette meets the Care Plan requirement. This Care Plan has been established and reviewed with the {PATIENT, FAMILY MEMBER, CAREGIVER:470849}.    Counseling Resources:  ATP IV Guidelines  Pooled Cohorts Equation Calculator  Breast Cancer Risk Calculator  FRAX Risk Assessment  ICSI Preventive Guidelines  Dietary Guidelines for Americans, 2010  USDA's MyPlate  ASA Prophylaxis  Lung CA Screening    Joy Dickson MD  [facility]  "

## 2019-02-10 ASSESSMENT — ENCOUNTER SYMPTOMS
SORE THROAT: 0
SHORTNESS OF BREATH: 0
HEMATOCHEZIA: 0
PALPITATIONS: 0
DYSURIA: 0
DIARRHEA: 0
CHILLS: 0
HEADACHES: 1
DIZZINESS: 0
CONSTIPATION: 0
COUGH: 0
ARTHRALGIAS: 1
FEVER: 0
ABDOMINAL PAIN: 0
FREQUENCY: 0
HEMATURIA: 0
EYE PAIN: 0
NERVOUS/ANXIOUS: 0
NAUSEA: 0
MYALGIAS: 0
BREAST MASS: 0
JOINT SWELLING: 0
WEAKNESS: 0
HEARTBURN: 0
PARESTHESIAS: 0

## 2019-02-10 ASSESSMENT — ACTIVITIES OF DAILY LIVING (ADL): CURRENT_FUNCTION: NO ASSISTANCE NEEDED

## 2019-02-11 ENCOUNTER — OFFICE VISIT (OUTPATIENT)
Dept: FAMILY MEDICINE | Facility: CLINIC | Age: 64
End: 2019-02-11
Payer: COMMERCIAL

## 2019-02-11 VITALS
HEIGHT: 64 IN | DIASTOLIC BLOOD PRESSURE: 64 MMHG | HEART RATE: 77 BPM | WEIGHT: 180.6 LBS | SYSTOLIC BLOOD PRESSURE: 136 MMHG | RESPIRATION RATE: 16 BRPM | BODY MASS INDEX: 30.83 KG/M2 | TEMPERATURE: 99.6 F

## 2019-02-11 DIAGNOSIS — G43.909 MIGRAINE WITHOUT STATUS MIGRAINOSUS, NOT INTRACTABLE, UNSPECIFIED MIGRAINE TYPE: ICD-10-CM

## 2019-02-11 DIAGNOSIS — E78.5 HYPERLIPIDEMIA LDL GOAL <130: ICD-10-CM

## 2019-02-11 DIAGNOSIS — R73.01 ELEVATED FASTING GLUCOSE: ICD-10-CM

## 2019-02-11 DIAGNOSIS — Z00.00 ENCOUNTER FOR ROUTINE ADULT HEALTH EXAMINATION WITHOUT ABNORMAL FINDINGS: Primary | ICD-10-CM

## 2019-02-11 LAB — HBA1C MFR BLD: 6.1 % (ref 0–5.6)

## 2019-02-11 PROCEDURE — 36415 COLL VENOUS BLD VENIPUNCTURE: CPT | Performed by: FAMILY MEDICINE

## 2019-02-11 PROCEDURE — 99212 OFFICE O/P EST SF 10 MIN: CPT | Mod: 25 | Performed by: FAMILY MEDICINE

## 2019-02-11 PROCEDURE — 83036 HEMOGLOBIN GLYCOSYLATED A1C: CPT | Performed by: FAMILY MEDICINE

## 2019-02-11 PROCEDURE — 99397 PER PM REEVAL EST PAT 65+ YR: CPT | Mod: 25 | Performed by: FAMILY MEDICINE

## 2019-02-11 RX ORDER — RIZATRIPTAN BENZOATE 10 MG/1
10 TABLET, ORALLY DISINTEGRATING ORAL SEE ADMIN INSTRUCTIONS
Qty: 25 TABLET | Refills: 3 | Status: SHIPPED | OUTPATIENT
Start: 2019-02-11 | End: 2019-04-10

## 2019-02-11 RX ORDER — PRAVASTATIN SODIUM 40 MG
40 TABLET ORAL DAILY
Qty: 90 TABLET | Refills: 3 | Status: SHIPPED | OUTPATIENT
Start: 2019-02-11 | End: 2019-04-10

## 2019-02-11 ASSESSMENT — ENCOUNTER SYMPTOMS
HEADACHES: 1
BREAST MASS: 0
DIARRHEA: 0
NAUSEA: 0
HEMATOCHEZIA: 0
PARESTHESIAS: 0
PALPITATIONS: 0
NERVOUS/ANXIOUS: 0
DIZZINESS: 0
HEARTBURN: 0
EYE PAIN: 0
HEMATURIA: 0
SHORTNESS OF BREATH: 0
FEVER: 0
CHILLS: 0
WEAKNESS: 0
CONSTIPATION: 0
ARTHRALGIAS: 1
JOINT SWELLING: 0
FREQUENCY: 0
ABDOMINAL PAIN: 0
COUGH: 0
MYALGIAS: 0
SORE THROAT: 0
DYSURIA: 0

## 2019-02-11 ASSESSMENT — MIFFLIN-ST. JEOR: SCORE: 1349.2

## 2019-02-11 ASSESSMENT — ACTIVITIES OF DAILY LIVING (ADL): CURRENT_FUNCTION: NO ASSISTANCE NEEDED

## 2019-02-11 NOTE — NURSING NOTE
"Chief Complaint   Patient presents with     Wellness Visit       Initial /76   Pulse 77   Temp 99.6  F (37.6  C) (Oral)   Resp 16   Ht 1.626 m (5' 4\")   Wt 81.9 kg (180 lb 9.6 oz)   BMI 31.00 kg/m   Estimated body mass index is 31 kg/m  as calculated from the following:    Height as of this encounter: 1.626 m (5' 4\").    Weight as of this encounter: 81.9 kg (180 lb 9.6 oz).  Medication Reconciliation: complete  Carlos Brooks CMA    "

## 2019-02-11 NOTE — PROGRESS NOTES
"SUBJECTIVE:   Nannette Awad is a 63 year old female who presents for Preventive Visit.      Are you in the first 12 months of your Medicare coverage?  No    Annual Wellness Visit     In general, how would you rate your overall health?  Good    Frequency of exercise:  2-3 days/week    Duration of exercise:  Less than 15 minutes    Do you usually eat at least 4 servings of fruit and vegetables a day, include whole grains    & fiber and avoid regularly eating high fat or \"junk\" foods?  No    Taking medications regularly:  Yes    Medication side effects:  None    Ability to successfully perform activities of daily living:  No assistance needed    Home Safety:  No safety concerns identified    Hearing Impairment:  Difficulty understanding soft or whispered speech    In the past 6 months, have you been bothered by leaking of urine? Yes    In general, how would you rate your overall mental or emotional health?  Good    PHQ-2 Total Score: 0    Additional concerns today:  No    Do you feel safe in your environment? Yes    Do you have a Health Care Directive? No: Advance care planning was reviewed with patient; patient declined at this time.      Fall risk  Fallen 2 or more times in the past year?: No  Any fall with injury in the past year?: No      Do you have sleep apnea, excessive snoring or daytime drowsiness?: no    Reviewed and updated as needed this visit by clinical staff  Tobacco  Allergies  Meds  Problems  Med Hx  Surg Hx  Fam Hx  Soc Hx          Reviewed and updated as needed this visit by Provider  Tobacco  Allergies  Meds  Problems  Med Hx  Surg Hx  Fam Hx        Social History     Tobacco Use     Smoking status: Never Smoker     Smokeless tobacco: Never Used   Substance Use Topics     Alcohol use: No       Alcohol Use 2/10/2019   If you drink alcohol do you typically have greater than 3 drinks per day OR greater than 7 drinks per week? No   No flowsheet data found.          G 3 P 2   No LMP " recorded. Patient has had a hysterectomy.     Fasting: No   Td: tdap        Status post benign hysterectomy. Health Maintenance and Surgical History updated.               Cholesterol:   Lab Results   Component Value Date    CHOL 245 2018     Lab Results   Component Value Date    HDL 84 2018     Lab Results   Component Value Date     2018     Lab Results   Component Value Date    TRIG 114 2018     Lab Results   Component Value Date    CHOLHDLRATIO 2.7 2015         MM/18  Dexa:       Flex/colo:       Seat Belt: Yes    Sunscreen use: Yes   Calcium Intake: adeq  Health Care Directive: No  Sexually Active: Yes     Current contraception: hysterectomy  History of abnormal Pap smear: Yes: see [facility]  Family history of colon/breast/ovarian cancer: Yes: see [facility]  Regular self breast exam: Yes  History of abnormal mammogram: Yes: see reports      Current providers sharing in care for this patient include:   Patient Care Team:  Joy Dickson MD as PCP - General (Family Practice)  Joy Dickson MD as PCP - Assigned PCP    The following health maintenance items are reviewed in Epic and correct as of today:  Health Maintenance   Topic Date Due     HIV SCREEN (SYSTEM ASSIGNED)  1973     ZOSTER IMMUNIZATION (2 of 3) 2013     INFLUENZA VACCINE (1) 2018     A1C Q6 MO  10/09/2018     COLONOSCOPY Q5 YR  2019     FALL RISK ASSESSMENT  2019     LIPID MONITORING Q1 YEAR  2019     MAMMO Q1 YR  2019     BMP Q6 MOS  2019     PHQ-2 Q1 YR  2020     DTAP/TDAP/TD IMMUNIZATION (3 - Td) 10/15/2020     ADVANCE DIRECTIVE PLANNING Q5 YRS  2022     DEXA Q5 YR  2023     MIGRAINE ACTION PLAN  Completed     HEPATITIS C SCREENING  Completed     IPV IMMUNIZATION  Aged Out     MENINGITIS IMMUNIZATION  Aged Out     Labs reviewed in EPIC  BP Readings from Last 3 Encounters:   19 136/64   19 132/80   18 150/80     Wt Readings from Last 3 Encounters:   02/11/19 81.9 kg (180 lb 9.6 oz)   04/09/18 81.6 kg (179 lb 12.8 oz)   03/23/17 82.6 kg (182 lb)                  Patient Active Problem List   Diagnosis     Hyperlipidemia LDL goal <130     Hypertension goal BP (blood pressure) < 150/90     Advanced directives, counseling/discussion     Osteopenia     GERD (gastroesophageal reflux disease)     Family history of breast cancer     Migraine without status migrainosus, not intractable, unspecified migraine type     Alopecia     Elevated fasting glucose     Vitamin D deficiency     Past Surgical History:   Procedure Laterality Date     BREAST BIOPSY, CORE RT/LT  1990     COLONOSCOPY       ESOPHAGOSCOPY, GASTROSCOPY, DUODENOSCOPY (EGD), COMBINED  9/17/2012    Procedure: COMBINED ESOPHAGOSCOPY, GASTROSCOPY, DUODENOSCOPY (EGD), BIOPSY SINGLE OR MULTIPLE;  EGD-GERD;  Surgeon: Teo Alvarado MD;  Location: [facility] OR     HYSTERECTOMY, PAP NO LONGER INDICATED  09/22/2006    vaginal hyst with bilateral sal-ooph, HSIL     LIGATN/STRIP LONG OR SHORT SAPHEN  11/09    right leg     SURGICAL HISTORY OF -   2006    conization for abnl PAP       Social History     Tobacco Use     Smoking status: Never Smoker     Smokeless tobacco: Never Used   Substance Use Topics     Alcohol use: No     Family History   Problem Relation Age of Onset     Heart Disease Mother         CHF     Hypertension Mother      Lipids Mother      Neurologic Disorder Mother 30        migraines     Alzheimer Disease Mother 67     Cancer Maternal Grandmother      Hypertension Brother      Hypertension Brother      Thyroid Disease Paternal Grandmother      Breast Cancer Paternal Grandmother      Cancer Father 76        Pancreatic ca age 76     Other Cancer Father         pancreatic     Breast Cancer Maternal Aunt          Current Outpatient Medications   Medication Sig Dispense Refill     pravastatin (PRAVACHOL) 40 MG tablet Take 1 tablet (40 mg) by mouth daily 90 tablet 3      rizatriptan (MAXALT-MLT) 10 MG ODT Take 1 tablet (10 mg) by mouth See Admin Instructions WITH ONSET OF MIGRAINE, MAY REPEAT ONCE AFTER 2 HOURS. DO NOT EXCEED 3 TABLETS IN 24 HOURS. 25 tablet 3     amLODIPine (NORVASC) 10 MG tablet Take 1 tablet (10 mg) by mouth daily 90 tablet 1     aspirin 81 MG tablet Take 1 tablet by mouth daily.       Calcium Carbonate-Vitamin D (CALCIUM 500 + D PO) Take  by mouth. One tab three time a day  .        FISH OIL daily.       lisinopril (PRINIVIL/ZESTRIL) 10 MG tablet Take 1 tablet (10 mg) by mouth daily 90 tablet 1     MULTI-DAY OR daily       omeprazole (PRILOSEC) 20 MG DR capsule        sertraline (ZOLOFT) 100 MG tablet TAKE 1 & 1/2 (ONE & ONE-HALF) TABLETS BY MOUTH ONCE DAILY 135 tablet 1     VITAMIN D 1000 UNIT OR TABS 1 TABLET DAILY       Pneumonia Vaccine:PPV 23 due 4/19  Mammogram Screening: Mammogram Screening: Patient over age 50, mutual decision to screen reflected in health maintenance.  shingrix:    Review of Systems   Constitutional: Negative for chills and fever.   HENT: Positive for hearing loss. Negative for congestion, ear pain and sore throat.    Eyes: Negative for pain and visual disturbance.   Respiratory: Negative for cough and shortness of breath.    Cardiovascular: Negative for chest pain, palpitations and peripheral edema.   Gastrointestinal: Negative for abdominal pain, constipation, diarrhea, heartburn, hematochezia and nausea.   Breasts:  Negative for tenderness, breast mass and discharge.   Genitourinary: Negative for dysuria, frequency, genital sores, hematuria, pelvic pain, urgency, vaginal bleeding and vaginal discharge.   Musculoskeletal: Positive for arthralgias. Negative for joint swelling and myalgias.   Skin: Negative for rash.   Neurological: Positive for headaches. Negative for dizziness, weakness and paresthesias.   Psychiatric/Behavioral: Negative for mood changes. The patient is not nervous/anxious.      Constitutional, HEENT,  "cardiovascular, pulmonary, gi and gu systems are negative, except as otherwise noted.    OBJECTIVE:   /64   Pulse 77   Temp 99.6  F (37.6  C) (Oral)   Resp 16   Ht 1.626 m (5' 4\")   Wt 81.9 kg (180 lb 9.6 oz)   BMI 31.00 kg/m   Estimated body mass index is 31 kg/m  as calculated from the following:    Height as of this encounter: 1.626 m (5' 4\").    Weight as of this encounter: 81.9 kg (180 lb 9.6 oz).  Physical Exam  GENERAL APPEARANCE: healthy, alert and no distress  EYES: Eyes grossly normal to inspection, PERRL and conjunctivae and sclerae normal  HENT: ear canals and TM's normal, nose and mouth without ulcers or lesions, oropharynx clear and oral mucous membranes moist  NECK: no adenopathy, no asymmetry, masses, or scars and thyroid normal to palpation  RESP: lungs clear to auscultation - no rales, rhonchi or wheezes  BREAST: normal without masses, tenderness or nipple discharge and no palpable axillary masses or adenopathy  CV: regular rate and rhythm, normal S1 S2, no S3 or S4, no murmur, click or rub, no peripheral edema and peripheral pulses strong  ABDOMEN: soft, nontender, no hepatosplenomegaly, no masses and bowel sounds normal  MS: no musculoskeletal defects are noted and gait is age appropriate without ataxia  SKIN: no suspicious lesions or rashes  NEURO: Normal strength and tone, sensory exam grossly normal, mentation intact and speech normal  PSYCH: mentation appears normal and affect normal/bright    Diagnostic Test Results:  none     ASSESSMENT / PLAN:   (Z00.00) Encounter for routine adult health examination without abnormal findings  (primary encounter diagnosis)  Comment: preventive needs reviewed  Plan: see orders in Epic.     (R73.01) Elevated fasting glucose  Comment: due  Plan: HEMOGLOBIN A1C            (E78.5) Hyperlipidemia LDL goal <130  Comment: stable  Plan: pravastatin (PRAVACHOL) 40 MG tablet,         OFFICE/OUTPT VISIT,EST,LEVL II        Refill x 1 yr     (G43.919) " "Migraine without status migrainosus, not intractable, unspecified migraine type  Comment: controlled  Plan: rizatriptan (MAXALT-MLT) 10 MG ODT,         OFFICE/OUTPT VISIT,EST,LEVL II         Refill x 6 months       End of Life Planning:  Patient currently has an advanced directive: No.  I have verified the patient's ablity to prepare an advanced directive/make health care decisions.  Literature was provided to assist patient in preparing an advanced directive.    COUNSELING:  Reviewed preventive health counseling, as reflected in patient instructions  Special attention given to:       Regular exercise       Healthy diet/nutrition    BP Readings from Last 1 Encounters:   02/11/19 136/64     Estimated body mass index is 31 kg/m  as calculated from the following:    Height as of this encounter: 1.626 m (5' 4\").    Weight as of this encounter: 81.9 kg (180 lb 9.6 oz).      Weight management plan: Discussed healthy diet and exercise guidelines     reports that  has never smoked. she has never used smokeless tobacco.      Appropriate preventive services were discussed with this patient, including applicable screening as appropriate for cardiovascular disease, diabetes, osteopenia/osteoporosis, and glaucoma.  As appropriate for age/gender, discussed screening for colorectal cancer, prostate cancer, breast cancer, and cervical cancer. Checklist reviewing preventive services available has been given to the patient.    Reviewed patients plan of care and provided an AVS. The Basic Care Plan (routine screening as documented in Health Maintenance) for Nannette meets the Care Plan requirement. This Care Plan has been established and reviewed with the Patient.    Counseling Resources:  ATP IV Guidelines  Pooled Cohorts Equation Calculator  Breast Cancer Risk Calculator  FRAX Risk Assessment  ICSI Preventive Guidelines  Dietary Guidelines for Americans, 2010  USDA's MyPlate  ASA Prophylaxis  Lung CA Screening    Joy Dickson, " MD  [facility]

## 2019-02-18 ENCOUNTER — ANCILLARY PROCEDURE (OUTPATIENT)
Dept: MAMMOGRAPHY | Facility: CLINIC | Age: 64
End: 2019-02-18
Payer: COMMERCIAL

## 2019-02-18 DIAGNOSIS — Z12.31 VISIT FOR SCREENING MAMMOGRAM: ICD-10-CM

## 2019-02-18 PROCEDURE — 77067 SCR MAMMO BI INCL CAD: CPT | Mod: TC

## 2019-03-11 ENCOUNTER — ANCILLARY PROCEDURE (OUTPATIENT)
Dept: BONE DENSITY | Facility: CLINIC | Age: 64
End: 2019-03-11
Payer: COMMERCIAL

## 2019-03-11 DIAGNOSIS — Z78.0 ASYMPTOMATIC POSTMENOPAUSAL STATUS: ICD-10-CM

## 2019-03-11 PROCEDURE — 77080 DXA BONE DENSITY AXIAL: CPT | Performed by: FAMILY MEDICINE

## 2019-04-09 DIAGNOSIS — I10 HYPERTENSION GOAL BP (BLOOD PRESSURE) < 150/90: ICD-10-CM

## 2019-04-09 DIAGNOSIS — E78.5 HYPERLIPIDEMIA LDL GOAL <130: ICD-10-CM

## 2019-04-09 DIAGNOSIS — K21.9 GASTROESOPHAGEAL REFLUX DISEASE, ESOPHAGITIS PRESENCE NOT SPECIFIED: Primary | ICD-10-CM

## 2019-04-09 DIAGNOSIS — G43.909 MIGRAINE WITHOUT STATUS MIGRAINOSUS, NOT INTRACTABLE, UNSPECIFIED MIGRAINE TYPE: ICD-10-CM

## 2019-04-10 RX ORDER — LISINOPRIL 10 MG/1
10 TABLET ORAL DAILY
Qty: 90 TABLET | Refills: 2 | Status: SHIPPED | OUTPATIENT
Start: 2019-04-10 | End: 2019-11-11

## 2019-04-10 RX ORDER — RIZATRIPTAN BENZOATE 10 MG/1
TABLET, ORALLY DISINTEGRATING ORAL
Qty: 25 TABLET | Refills: 3 | Status: SHIPPED | OUTPATIENT
Start: 2019-04-10 | End: 2020-03-12

## 2019-04-10 RX ORDER — PRAVASTATIN SODIUM 40 MG
40 TABLET ORAL DAILY
Qty: 90 TABLET | Refills: 2 | Status: SHIPPED | OUTPATIENT
Start: 2019-04-10 | End: 2019-11-25

## 2019-04-10 RX ORDER — AMLODIPINE BESYLATE 10 MG/1
10 TABLET ORAL DAILY
Qty: 90 TABLET | Refills: 2 | Status: SHIPPED | OUTPATIENT
Start: 2019-04-10 | End: 2019-11-11

## 2019-04-10 NOTE — TELEPHONE ENCOUNTER
Routing refill request to provider for review/approval because:  Medication is reported/historical  Raissa Lea BSN, RN

## 2019-04-17 ENCOUNTER — TELEPHONE (OUTPATIENT)
Dept: FAMILY MEDICINE | Facility: CLINIC | Age: 64
End: 2019-04-17

## 2019-04-17 NOTE — TELEPHONE ENCOUNTER
Patient informed  [facility] pharmacy is listed in chart, did delete all other pharmacies patient no longer uses.  Patient will call when due for refill    Emerita JASMINEN, RN, CPN

## 2019-04-17 NOTE — TELEPHONE ENCOUNTER
Caller states she recently changed pharmacy to [facility] mail order pharmacy all of her scripts were to be sent to them. States she spoke to mail order pharmacy and they are missing script for her sertraline. Does not need a refill at this time but wants to make sure it is listed for future refills. Please call with questions or to discuss.

## 2019-08-26 DIAGNOSIS — K21.9 GASTROESOPHAGEAL REFLUX DISEASE, ESOPHAGITIS PRESENCE NOT SPECIFIED: ICD-10-CM

## 2019-10-08 DIAGNOSIS — R51.9 FACIAL PAIN: ICD-10-CM

## 2019-10-08 NOTE — LETTER
October 10, 2019    Nannette Awad  [address]  [place] [address]    Dear Nannette,       We recently received a refill request for sertraline (ZOLOFT).  We have refilled this for a one time 90 day supply only because you are due for a:    Blood Pressure Check with Pharmacy or the Ancillary Nurse Due Now    Physical office visit Due on or after 02/11/2020      Your provider would like you to have a blood pressure check with either the pharmacy or the Ancillary Nurse.    The pharmacy, does not require an appointment, you can walk in at your convenience and the results are sent to your primary care provider for review, however you have to join the Pharmacy blood pressure program.    The other option is to see the  Ancillary nurse, this requires an appointment, Please call [phone] to schedule an appointment.   Either blood pressure check is of no cost to you.          Please call at your earliest convenience so that there will not be a delay with your future refills.          Thank you,   Your [facility] Team/mkr  [phone]

## 2019-10-09 RX ORDER — SERTRALINE HYDROCHLORIDE 100 MG/1
TABLET, FILM COATED ORAL
Qty: 135 TABLET | Refills: 0 | Status: SHIPPED | OUTPATIENT
Start: 2019-10-09 | End: 2019-12-11

## 2019-10-09 NOTE — TELEPHONE ENCOUNTER
Sertraline refill request; uses for facial pain.  Last OV 2/11/19 for well check; was to have returned for BP check in August.  Didn't.  No pending appointment.  Lucia Nur RN

## 2019-10-22 ENCOUNTER — ALLIED HEALTH/NURSE VISIT (OUTPATIENT)
Dept: FAMILY MEDICINE | Facility: CLINIC | Age: 64
End: 2019-10-22
Payer: COMMERCIAL

## 2019-10-22 VITALS — HEART RATE: 68 BPM | SYSTOLIC BLOOD PRESSURE: 134 MMHG | DIASTOLIC BLOOD PRESSURE: 66 MMHG

## 2019-10-22 DIAGNOSIS — I10 HYPERTENSION GOAL BP (BLOOD PRESSURE) < 150/90: Primary | ICD-10-CM

## 2019-10-22 PROCEDURE — 99207 ZZC NO CHARGE NURSE ONLY: CPT | Performed by: FAMILY MEDICINE

## 2019-10-22 NOTE — PROGRESS NOTES
Nannette Awad was evaluated at [facility] on October 22, 2019 at which time her blood pressure was:    BP Readings from Last 3 Encounters:   10/22/19 134/66   02/11/19 136/64   01/07/19 132/80     Pulse Readings from Last 3 Encounters:   10/22/19 68   02/11/19 77   01/07/19 72       Reviewed lifestyle modifications for blood pressure control and reduction: including making healthy food choices, managing weight, getting regular exercise, smoking cessation, reducing alcohol consumption, monitoring blood pressure regularly.     Symptoms: None    BP Goal:< 140/90 mmHg    BP Assessment:  BP at goal    Potential Reasons for BP too high: NA - Not applicable    BP Follow-Up Plan: Recheck BP in 6 months at pharmacy    Recommendation to Provider: None at this time, continue current therapy.    Note completed by: Mandy Puentes, PharmD  [facility]

## 2019-11-09 ENCOUNTER — HEALTH MAINTENANCE LETTER (OUTPATIENT)
Age: 64
End: 2019-11-09

## 2019-11-11 DIAGNOSIS — I10 HYPERTENSION GOAL BP (BLOOD PRESSURE) < 150/90: ICD-10-CM

## 2019-11-11 RX ORDER — LISINOPRIL 10 MG/1
TABLET ORAL
Qty: 90 TABLET | Refills: 0 | Status: SHIPPED | OUTPATIENT
Start: 2019-11-11 | End: 2020-01-16

## 2019-11-11 RX ORDER — AMLODIPINE BESYLATE 10 MG/1
TABLET ORAL
Qty: 90 TABLET | Refills: 0 | Status: SHIPPED | OUTPATIENT
Start: 2019-11-11 | End: 2020-01-16

## 2019-11-25 ENCOUNTER — TELEPHONE (OUTPATIENT)
Dept: FAMILY MEDICINE | Facility: CLINIC | Age: 64
End: 2019-11-25

## 2019-11-25 DIAGNOSIS — R73.01 ELEVATED FASTING GLUCOSE: ICD-10-CM

## 2019-11-25 DIAGNOSIS — E78.5 HYPERLIPIDEMIA LDL GOAL <130: ICD-10-CM

## 2019-11-25 DIAGNOSIS — I10 HYPERTENSION GOAL BP (BLOOD PRESSURE) < 150/90: Primary | ICD-10-CM

## 2019-11-26 RX ORDER — PRAVASTATIN SODIUM 40 MG
TABLET ORAL
Qty: 90 TABLET | Refills: 0 | Status: SHIPPED | OUTPATIENT
Start: 2019-11-26 | End: 2020-02-05

## 2019-11-26 NOTE — TELEPHONE ENCOUNTER
Routing refill request to provider for review/approval because:  Labs not current:  Lipids  Catherine Cornejo RN

## 2019-11-26 NOTE — TELEPHONE ENCOUNTER
Letter was sent to the patient to schedule wellness exam and fasting lab. Also informed to call early as her schedule is out to middle of Jan 2020.  MARLENE Whipple

## 2019-12-11 DIAGNOSIS — R51.9 FACIAL PAIN: ICD-10-CM

## 2019-12-12 RX ORDER — SERTRALINE HYDROCHLORIDE 100 MG/1
TABLET, FILM COATED ORAL
Qty: 135 TABLET | Refills: 0 | Status: SHIPPED | OUTPATIENT
Start: 2019-12-12 | End: 2020-02-19

## 2020-01-15 DIAGNOSIS — I10 HYPERTENSION GOAL BP (BLOOD PRESSURE) < 150/90: ICD-10-CM

## 2020-01-16 RX ORDER — LISINOPRIL 10 MG/1
TABLET ORAL
Qty: 90 TABLET | Refills: 0 | Status: SHIPPED | OUTPATIENT
Start: 2020-01-16 | End: 2020-03-01

## 2020-01-16 RX ORDER — AMLODIPINE BESYLATE 10 MG/1
TABLET ORAL
Qty: 90 TABLET | Refills: 0 | Status: SHIPPED | OUTPATIENT
Start: 2020-01-16 | End: 2020-03-01

## 2020-01-16 NOTE — TELEPHONE ENCOUNTER
"Patient has upcoming/ pending appointment:    Next 5 appointments (look out 90 days)    Mar 02, 2020 10:00 AM CST  PHYSICAL with Joy Dickson MD  [facility] [address] [facility] [address] [phone]          Rx refilled per [facility] refill protocol.    Jewell JASMINEN, RN      Requested Prescriptions   Signed Prescriptions Disp Refills    lisinopril (PRINIVIL/ZESTRIL) 10 MG tablet 90 tablet 0     Sig: TAKE 1 TABLET EVERY DAY       ACE Inhibitors (Including Combos) Protocol Failed - 1/15/2020  4:02 PM        Failed - Normal serum creatinine on file in past 12 months     Recent Labs   Lab Test 01/07/19  0929   CR 0.86             Failed - Normal serum potassium on file in past 12 months     Recent Labs   Lab Test 01/07/19  0929   POTASSIUM 4.6             Passed - Blood pressure under 140/90 in past 12 months     BP Readings from Last 3 Encounters:   10/22/19 134/66   02/11/19 136/64   01/07/19 132/80                 Passed - Recent (12 mo) or future (30 days) visit within the authorizing provider's specialty     Patient has had an office visit with the authorizing provider or a provider within the authorizing providers department within the previous 12 mos or has a future within next 30 days. See \"Patient Info\" tab in inbasket, or \"Choose Columns\" in Meds & Orders section of the refill encounter.              Passed - Medication is active on med list        Passed - Patient is age 18 or older        Passed - No active pregnancy on record        Passed - No positive pregnancy test within past 12 months       amLODIPine (NORVASC) 10 MG tablet 90 tablet 0     Sig: TAKE 1 TABLET EVERY DAY       Calcium Channel Blockers Protocol  Failed - 1/15/2020  4:02 PM        Failed - Normal serum creatinine on file in past 12 months     Recent Labs   Lab Test 01/07/19  0929   CR 0.86             Passed - Blood pressure under 140/90 in past 12 months     BP " "Readings from Last 3 Encounters:   10/22/19 134/66   02/11/19 136/64   01/07/19 132/80                 Passed - Recent (12 mo) or future (30 days) visit within the authorizing provider's specialty     Patient has had an office visit with the authorizing provider or a provider within the authorizing providers department within the previous 12 mos or has a future within next 30 days. See \"Patient Info\" tab in inbasket, or \"Choose Columns\" in Meds & Orders section of the refill encounter.              Passed - Medication is active on med list        Passed - Patient is age 18 or older        Passed - No active pregnancy on record        Passed - No positive pregnancy test in past 12 months          "

## 2020-02-04 DIAGNOSIS — K21.9 GASTROESOPHAGEAL REFLUX DISEASE, ESOPHAGITIS PRESENCE NOT SPECIFIED: ICD-10-CM

## 2020-02-04 DIAGNOSIS — E78.5 HYPERLIPIDEMIA LDL GOAL <130: ICD-10-CM

## 2020-02-05 RX ORDER — PRAVASTATIN SODIUM 40 MG
TABLET ORAL
Qty: 90 TABLET | Refills: 0 | Status: SHIPPED | OUTPATIENT
Start: 2020-02-05 | End: 2020-03-01

## 2020-02-05 NOTE — TELEPHONE ENCOUNTER
Next 5 appointments (look out 90 days)    Mar 02, 2020 10:00 AM CST  PHYSICAL with Joy Dickson MD  [facility] ([facility]) [id number] [facility] [id number] [phone]        Prescription approved per [facility] Refill Protocol.  Catherine Cornejo RN

## 2020-02-18 DIAGNOSIS — R51.9 FACIAL PAIN: ICD-10-CM

## 2020-02-19 RX ORDER — SERTRALINE HYDROCHLORIDE 100 MG/1
TABLET, FILM COATED ORAL
Qty: 135 TABLET | Refills: 0 | Status: SHIPPED | OUTPATIENT
Start: 2020-02-19 | End: 2020-03-01

## 2020-02-19 NOTE — PATIENT INSTRUCTIONS
Patient Education   Personalized Prevention Plan  You are due for the preventive services outlined below.  Your care team is available to assist you in scheduling these services.  If you have already completed any of these items, please share that information with your care team to update in your medical record.  Health Maintenance Due   Topic Date Due     Colonoscopy  04/08/2019     Pneumococcal Vaccine (2 of 2 - PPSV23) 04/09/2019     FALL RISK ASSESSMENT  02/11/2020     Annual Wellness Visit  02/11/2020     Mammogram  02/18/2020

## 2020-02-19 NOTE — PROGRESS NOTES
"SUBJECTIVE:   Nannette Awad is a 64 year old female who presents for Preventive Visit.      Are you in the first 12 months of your Medicare coverage?  No    Healthy Habits:     In general, how would you rate your overall health?  Good    Frequency of exercise:  2-3 days/week    Duration of exercise:  Less than 15 minutes    Do you usually eat at least 4 servings of fruit and vegetables a day, include whole grains    & fiber and avoid regularly eating high fat or \"junk\" foods?  No    Taking medications regularly:  Yes    Medication side effects:  None    Ability to successfully perform activities of daily living:  No assistance needed    Home Safety:  No safety concerns identified    Hearing Impairment:  Difficulty following a conversation in a noisy restaurant or crowded room and need to ask people to speak up or repeat themselves    In the past 6 months, have you been bothered by leaking of urine? Yes    In general, how would you rate your overall mental or emotional health?  Good      PHQ-2 Total Score: 0    Additional concerns today:  Yes    Do you feel safe in your environment? Yes    Have you ever done Advance Care Planning? (For example, a Health Directive, POLST, or a discussion with a medical provider or your loved ones about your wishes): No, advance care planning information given to patient to review.  Patient plans to discuss their wishes with loved ones or provider.        Fall risk  Fallen 2 or more times in the past year?: No  Any fall with injury in the past year?: No    Cognitive Screening   1) Repeat 3 items (Leader, Season, Table)    2) Clock draw: NORMAL  3) 3 item recall: Recalls 1 object   Results: NORMAL clock, 1-2 items recalled: COGNITIVE IMPAIRMENT LESS LIKELY    Mini-CogTM Copyright LETICIA Cole. Licensed by the author for use in [facility]; reprinted with permission (sofía@.[email or web address]). All rights reserved.      Do you have sleep apnea, excessive snoring or daytime drowsiness?: " no    Reviewed and updated as needed this visit by clinical staff  Tobacco  Allergies  Meds  Problems  Med Hx  Surg Hx  Fam Hx  Soc Hx          Reviewed and updated as needed this visit by Provider  Tobacco  Allergies  Meds  Problems  Med Hx  Surg Hx  Fam Hx        Social History     Tobacco Use     Smoking status: Never Smoker     Smokeless tobacco: Never Used   Substance Use Topics     Alcohol use: No         Alcohol Use 3/2/2020   Prescreen: >3 drinks/day or >7 drinks/week? Not Applicable   Prescreen: >3 drinks/day or >7 drinks/week? -               Current providers sharing in care for this patient include:   Patient Care Team:  Joy Dickson MD as PCP - General (Family Practice)  Joy Dickson MD as Assigned PCP    The following health maintenance items are reviewed in Epic and correct as of today:  Health Maintenance   Topic Date Due     COLONOSCOPY  04/08/2019     PNEUMOCOCCAL IMMUNIZATION 65+ LOW/MEDIUM RISK (2 of 2 - PPSV23) 04/09/2019     FALL RISK ASSESSMENT  02/11/2020     MEDICARE ANNUAL WELLNESS VISIT  02/11/2020     MAMMO SCREENING  02/18/2020     A1C  08/23/2020     BMP  08/23/2020     DTAP/TDAP/TD IMMUNIZATION (3 - Td) 10/15/2020     LIPID  02/23/2021     ADVANCE CARE PLANNING  02/06/2022     DEXA  03/11/2024     HEPATITIS C SCREENING  Completed     MIGRAINE ACTION PLAN  Completed     PHQ-2  Completed     INFLUENZA VACCINE  Completed     ZOSTER IMMUNIZATION  Completed     IPV IMMUNIZATION  Aged Out     MENINGITIS IMMUNIZATION  Aged Out     G 3 P 2   No LMP recorded. Patient has had a hysterectomy.     Fasting: No   Td:tdap 2010 -due       Status post benign hysterectomy. Health Maintenance and Surgical History updated.               Cholesterol:   Lab Results   Component Value Date    CHOL 244 02/23/2020     Lab Results   Component Value Date    HDL 87 02/23/2020     Lab Results   Component Value Date     02/23/2020     Lab Results   Component Value Date    TRIG  104 2020     Lab Results   Component Value Date    KAMILA 2.7 2015         MM/19  Dexa:  3/19     Flex/colo:       Seat Belt: Yes    Sunscreen use: Yes   Calcium Intake: adeq  Health Care Directive: No  Sexually Active: Yes     Current contraception: hysterectomy  History of abnormal Pap smear: Yes: see [facility]  Family history of colon/breast/ovarian cancer: Yes: see [facility]  Regular self breast exam: Yes  History of abnormal mammogram: Yes: see reports      Labs reviewed in EPIC  BP Readings from Last 3 Encounters:   20 (!) 161/75   10/22/19 134/66   19 136/64    Wt Readings from Last 3 Encounters:   20 82.8 kg (182 lb 9.3 oz)   19 81.9 kg (180 lb 9.6 oz)   18 81.6 kg (179 lb 12.8 oz)                  Patient Active Problem List   Diagnosis     Hyperlipidemia LDL goal <130     Hypertension goal BP (blood pressure) < 150/90     Advanced directives, counseling/discussion     Osteopenia     GERD (gastroesophageal reflux disease)     Family history of breast cancer     Migraine without status migrainosus, not intractable, unspecified migraine type     Alopecia     Elevated fasting glucose     Vitamin D deficiency     Past Surgical History:   Procedure Laterality Date     BREAST BIOPSY, CORE RT/LT       COLONOSCOPY       ESOPHAGOSCOPY, GASTROSCOPY, DUODENOSCOPY (EGD), COMBINED  2012    Procedure: COMBINED ESOPHAGOSCOPY, GASTROSCOPY, DUODENOSCOPY (EGD), BIOPSY SINGLE OR MULTIPLE;  EGD-GERD;  Surgeon: Teo Alvarado MD;  Location: [facility] OR     HYSTERECTOMY, PAP NO LONGER INDICATED  2006    vaginal hyst with bilateral sal-ooph, HSIL     LIGATN/STRIP LONG OR SHORT SAPHEN      right leg     SURGICAL HISTORY OF -       conization for abnl PAP       Social History     Tobacco Use     Smoking status: Never Smoker     Smokeless tobacco: Never Used   Substance Use Topics     Alcohol use: No     Family History   Problem Relation Age of Onset     Heart  Disease Mother         CHF     Hypertension Mother      Lipids Mother      Neurologic Disorder Mother 30        migraines     Alzheimer Disease Mother 67     Cancer Maternal Grandmother      Hypertension Brother      Hypertension Brother      Thyroid Disease Paternal Grandmother      Breast Cancer Paternal Grandmother      Cancer Father 76        Pancreatic ca age 76     Other Cancer Father         pancreatic     Breast Cancer Maternal Aunt          Current Outpatient Medications   Medication Sig Dispense Refill     amLODIPine (NORVASC) 10 MG tablet TAKE 1 TABLET EVERY DAY 90 tablet 0     aspirin 81 MG tablet Take 1 tablet by mouth daily.       Calcium Carbonate-Vitamin D (CALCIUM 500 + D PO) Take  by mouth. One tab three time a day  .        lisinopril (PRINIVIL/ZESTRIL) 10 MG tablet TAKE 1 TABLET EVERY DAY 90 tablet 0     MULTI-DAY OR daily       omeprazole (PRILOSEC) 20 MG DR capsule TAKE 1 CAPSULE EVERY DAY 90 capsule 0     pravastatin (PRAVACHOL) 40 MG tablet TAKE 1 TABLET EVERY DAY 90 tablet 0     rizatriptan (MAXALT-MLT) 10 MG ODT Take 10 mg by mouth with onset of migraine, may repeat once after 2 hours.  Do not exceed 3 tablets in 24 hours 25 tablet 3     SERTRALINE 100 MG PO tablet TAKE 1 AND 1/2 TABLETS BY MOUTH ONCE DAILY 135 tablet 0     VITAMIN D 1000 UNIT OR TABS 1 TABLET DAILY       Pneumonia Vaccine:PPV 23 due  Mammogram Screening: Mammogram Screening: Patient over age 50, mutual decision to screen reflected in health maintenance.  Shingrix: completed  Flu 9/19    Review of Systems   Constitutional: Negative for chills and fever.   HENT: Negative for congestion and ear pain.    Eyes: Negative for pain.   Respiratory: Negative for cough.    Cardiovascular: Negative for chest pain.   Gastrointestinal: Negative for abdominal pain, constipation, diarrhea and hematochezia.   Genitourinary: Negative for frequency and hematuria.   Musculoskeletal: Positive for gait problem (bilateral burning and leg pain  "from varicose/spider veins bilateral lower legs.).   Neurological: Negative for dizziness.   Psychiatric/Behavioral: The patient is not nervous/anxious.          OBJECTIVE:   BP (!) 161/75   Pulse 81   Temp 98.7  F (37.1  C) (Oral)   Resp 20   Ht 1.626 m (5' 4\")   Wt 82.8 kg (182 lb 9.3 oz)   BMI 31.34 kg/m   Estimated body mass index is 31.34 kg/m  as calculated from the following:    Height as of this encounter: 1.626 m (5' 4\").    Weight as of this encounter: 82.8 kg (182 lb 9.3 oz).  Physical Exam  GENERAL APPEARANCE: healthy, alert and no distress  EYES: Eyes grossly normal to inspection, PERRL and conjunctivae and sclerae normal  HENT: ear canals and TM's normal, nose and mouth without ulcers or lesions, oropharynx clear and oral mucous membranes moist  NECK: no adenopathy, no asymmetry, masses, or scars and thyroid normal to palpation  RESP: lungs clear to auscultation - no rales, rhonchi or wheezes  BREAST: normal without masses, tenderness or nipple discharge and no palpable axillary masses or adenopathy  CV: regular rate and rhythm, normal S1 S2, no S3 or S4, no murmur, click or rub, no peripheral edema and peripheral pulses strong  ABDOMEN: soft, nontender, no hepatosplenomegaly, no masses and bowel sounds normal  MS: no musculoskeletal defects are noted and gait is age appropriate without ataxia, multiple spider veins and varicose veins bilateral lower extremities  SKIN: no suspicious lesions or rashes  NEURO: Normal strength and tone, sensory exam grossly normal, mentation intact and speech normal  PSYCH: mentation appears normal and affect normal/bright    Diagnostic Test Results:  Labs reviewed in Epic    ASSESSMENT / PLAN:   (Z00.00) Encounter for Medicare annual wellness exam  (primary encounter diagnosis)  Comment: preventive needs reviewed   Plan: see orders in Epic.     (I10) Hypertension goal BP (blood pressure) < 150/90  Comment: to goal  Plan: amLODIPine (NORVASC) 10 MG tablet, " lisinopril         (ZESTRIL) 10 MG tablet, OFFICE/OUTPT         VISIT,EST,LEVL III         Refill x 6 months f/u 6 months for pharm bp and labs     (E78.5) Hyperlipidemia LDL goal <130  Comment: stable  Plan: pravastatin (PRAVACHOL) 40 MG tablet,         OFFICE/OUTPT VISIT,EST,LEVL III        Refill x 1 yr     (R73.03) Prediabetes  Comment: stable a1c  Plan: OFFICE/OUTPT VISIT,EST,LEVL III            (G43.909) Migraine without status migrainosus, not intractable, unspecified migraine type  Comment: 3-4 per year  Plan: OFFICE/OUTPT VISIT,EST,LEVL III        maxalt not covered. Pt to check what is covered and will notify me.  Ok to refill x 1 year if messages which med is covered    (R51) Facial pain  Comment: stable  Plan: sertraline (ZOLOFT) 100 MG tablet, OFFICE/OUTPT        VISIT,EST,LEVL III         Refill x 6 months     (K21.9) Gastroesophageal reflux disease, esophagitis presence not specified  Comment: controlled  Plan: omeprazole (PRILOSEC) 20 MG DR capsule,         OFFICE/OUTPT VISIT,EST,LEVL III        Refill x 1 yr     (I83.813) Varicose veins of both lower extremities with pain  Comment: worsening symptms  Plan: VASCULAR SURGERY REFERRAL            (Z12.31) Encounter for screening mammogram for breast cancer  Comment: due  Plan: MA SCREENING DIGITAL BILAT - Future  (s+30)            (Z12.11) Special screening for malignant neoplasms, colon  Comment: due  Plan: GASTROENTEROLOGY ADULT REF PROCEDURE ONLY Taryn Bojorquez [facility] [phone]; [facility]         Surgery            (Z23) Need for vaccination  Comment: due  Plan: TDAP VACCINE (ADACEL) [33856.002], 1st          Administration  [56219]              COUNSELING:  Reviewed preventive health counseling, as reflected in patient instructions  Special attention given to:       Regular exercise       Healthy diet/nutrition    Estimated body mass index is 31.34 kg/m  as calculated from the following:    Height as of this encounter: 1.626 m (5'  "4\").    Weight as of this encounter: 82.8 kg (182 lb 9.3 oz).    Weight management plan: Discussed healthy diet and exercise guidelines     reports that she has never smoked. She has never used smokeless tobacco.      Appropriate preventive services were discussed with this patient, including applicable screening as appropriate for cardiovascular disease, diabetes, osteopenia/osteoporosis, and glaucoma.  As appropriate for age/gender, discussed screening for colorectal cancer, prostate cancer, breast cancer, and cervical cancer. Checklist reviewing preventive services available has been given to the patient.    Reviewed patients plan of care and provided an AVS. The Basic Care Plan (routine screening as documented in Health Maintenance) for Nannette meets the Care Plan requirement. This Care Plan has been established and reviewed with the Patient.    Counseling Resources:  ATP IV Guidelines  Pooled Cohorts Equation Calculator  Breast Cancer Risk Calculator  FRAX Risk Assessment  ICSI Preventive Guidelines  Dietary Guidelines for Americans, 2010  USDA's MyPlate  ASA Prophylaxis  Lung CA Screening    Joy Dickson MD  [facility]    Identified Health Risks:  "

## 2020-02-20 NOTE — TELEPHONE ENCOUNTER
Medication refilled per [facility] RN protocol.   Giana Magdaleno RN   [facility]        Full Thickness Lip Wedge Repair (Flap) Text: Given the location of the defect and the proximity to free margins a full thickness wedge repair was deemed most appropriate.  Using a sterile surgical marker, the appropriate repair was drawn incorporating the defect and placing the expected incisions perpendicular to the vermilion border.  The vermilion border was also meticulously outlined to ensure appropriate reapproximation during the repair.  The area thus outlined was incised through and through with a #15 scalpel blade.  The muscularis and dermis were reaproximated with deep sutures following hemostasis. Care was taken to realign the vermilion border before proceeding with the superficial closure.  Once the vermilion was realigned the superfical and mucosal closure was finished.

## 2020-02-23 DIAGNOSIS — E78.5 HYPERLIPIDEMIA LDL GOAL <130: ICD-10-CM

## 2020-02-23 DIAGNOSIS — I10 HYPERTENSION GOAL BP (BLOOD PRESSURE) < 150/90: ICD-10-CM

## 2020-02-23 DIAGNOSIS — R73.01 ELEVATED FASTING GLUCOSE: ICD-10-CM

## 2020-02-23 LAB — HBA1C MFR BLD: 6 % (ref 0–5.6)

## 2020-02-23 PROCEDURE — 80048 BASIC METABOLIC PNL TOTAL CA: CPT | Performed by: FAMILY MEDICINE

## 2020-02-23 PROCEDURE — 83036 HEMOGLOBIN GLYCOSYLATED A1C: CPT | Performed by: FAMILY MEDICINE

## 2020-02-23 PROCEDURE — 36415 COLL VENOUS BLD VENIPUNCTURE: CPT | Performed by: FAMILY MEDICINE

## 2020-02-23 PROCEDURE — 80061 LIPID PANEL: CPT | Performed by: FAMILY MEDICINE

## 2020-02-24 LAB
ANION GAP SERPL CALCULATED.3IONS-SCNC: 7 MMOL/L (ref 3–14)
BUN SERPL-MCNC: 22 MG/DL (ref 7–30)
CALCIUM SERPL-MCNC: 9.9 MG/DL (ref 8.5–10.1)
CHLORIDE SERPL-SCNC: 105 MMOL/L (ref 94–109)
CHOLEST SERPL-MCNC: 244 MG/DL
CO2 SERPL-SCNC: 27 MMOL/L (ref 20–32)
CREAT SERPL-MCNC: 0.81 MG/DL (ref 0.52–1.04)
GFR SERPL CREATININE-BSD FRML MDRD: 75 ML/MIN/{1.73_M2}
GLUCOSE SERPL-MCNC: 111 MG/DL (ref 70–99)
HDLC SERPL-MCNC: 87 MG/DL
LDLC SERPL CALC-MCNC: 136 MG/DL
NONHDLC SERPL-MCNC: 157 MG/DL
POTASSIUM SERPL-SCNC: 4.8 MMOL/L (ref 3.4–5.3)
SODIUM SERPL-SCNC: 139 MMOL/L (ref 133–144)
TRIGL SERPL-MCNC: 104 MG/DL

## 2020-02-24 NOTE — RESULT ENCOUNTER NOTE
Nannette,  Here are your lab results.  We will discuss these at your upcoming visit.   See you soon.  Joy Dickson MD

## 2020-03-02 ENCOUNTER — OFFICE VISIT (OUTPATIENT)
Dept: FAMILY MEDICINE | Facility: CLINIC | Age: 65
End: 2020-03-02
Payer: COMMERCIAL

## 2020-03-02 VITALS
HEIGHT: 64 IN | DIASTOLIC BLOOD PRESSURE: 66 MMHG | BODY MASS INDEX: 31.17 KG/M2 | RESPIRATION RATE: 20 BRPM | SYSTOLIC BLOOD PRESSURE: 134 MMHG | HEART RATE: 81 BPM | TEMPERATURE: 98.7 F | WEIGHT: 182.58 LBS

## 2020-03-02 DIAGNOSIS — R73.03 PREDIABETES: ICD-10-CM

## 2020-03-02 DIAGNOSIS — K21.9 GASTROESOPHAGEAL REFLUX DISEASE, ESOPHAGITIS PRESENCE NOT SPECIFIED: ICD-10-CM

## 2020-03-02 DIAGNOSIS — G43.909 MIGRAINE WITHOUT STATUS MIGRAINOSUS, NOT INTRACTABLE, UNSPECIFIED MIGRAINE TYPE: ICD-10-CM

## 2020-03-02 DIAGNOSIS — I10 HYPERTENSION GOAL BP (BLOOD PRESSURE) < 150/90: ICD-10-CM

## 2020-03-02 DIAGNOSIS — Z00.00 ENCOUNTER FOR MEDICARE ANNUAL WELLNESS EXAM: Primary | ICD-10-CM

## 2020-03-02 DIAGNOSIS — R51.9 FACIAL PAIN: ICD-10-CM

## 2020-03-02 DIAGNOSIS — Z12.31 ENCOUNTER FOR SCREENING MAMMOGRAM FOR BREAST CANCER: ICD-10-CM

## 2020-03-02 DIAGNOSIS — I83.813 VARICOSE VEINS OF BOTH LOWER EXTREMITIES WITH PAIN: ICD-10-CM

## 2020-03-02 DIAGNOSIS — Z23 NEED FOR VACCINATION: ICD-10-CM

## 2020-03-02 DIAGNOSIS — Z12.11 SPECIAL SCREENING FOR MALIGNANT NEOPLASMS, COLON: ICD-10-CM

## 2020-03-02 DIAGNOSIS — E78.5 HYPERLIPIDEMIA LDL GOAL <130: ICD-10-CM

## 2020-03-02 PROCEDURE — 90471 IMMUNIZATION ADMIN: CPT | Performed by: FAMILY MEDICINE

## 2020-03-02 PROCEDURE — 90715 TDAP VACCINE 7 YRS/> IM: CPT | Performed by: FAMILY MEDICINE

## 2020-03-02 PROCEDURE — 99214 OFFICE O/P EST MOD 30 MIN: CPT | Mod: 25 | Performed by: FAMILY MEDICINE

## 2020-03-02 PROCEDURE — 99397 PER PM REEVAL EST PAT 65+ YR: CPT | Mod: 25 | Performed by: FAMILY MEDICINE

## 2020-03-02 RX ORDER — PRAVASTATIN SODIUM 40 MG
TABLET ORAL
Qty: 90 TABLET | Refills: 3 | Status: SHIPPED | OUTPATIENT
Start: 2020-03-02 | End: 2021-02-15

## 2020-03-02 RX ORDER — LISINOPRIL 10 MG/1
TABLET ORAL
Qty: 90 TABLET | Refills: 1 | Status: SHIPPED | OUTPATIENT
Start: 2020-03-02 | End: 2020-07-24

## 2020-03-02 RX ORDER — AMLODIPINE BESYLATE 10 MG/1
TABLET ORAL
Qty: 90 TABLET | Refills: 1 | Status: SHIPPED | OUTPATIENT
Start: 2020-03-02 | End: 2020-07-24

## 2020-03-02 RX ORDER — SERTRALINE HYDROCHLORIDE 100 MG/1
TABLET, FILM COATED ORAL
Qty: 135 TABLET | Refills: 1 | Status: SHIPPED | OUTPATIENT
Start: 2020-03-02 | End: 2020-09-02

## 2020-03-02 RX ORDER — RIZATRIPTAN BENZOATE 10 MG/1
TABLET, ORALLY DISINTEGRATING ORAL
Qty: 25 TABLET | Refills: 3 | Status: CANCELLED | OUTPATIENT
Start: 2020-03-02

## 2020-03-02 ASSESSMENT — ACTIVITIES OF DAILY LIVING (ADL): CURRENT_FUNCTION: NO ASSISTANCE NEEDED

## 2020-03-02 ASSESSMENT — ENCOUNTER SYMPTOMS
DIARRHEA: 0
CONSTIPATION: 0
HEMATURIA: 0
CHILLS: 0
NERVOUS/ANXIOUS: 0
ABDOMINAL PAIN: 0
DIZZINESS: 0
EYE PAIN: 0
FEVER: 0
FREQUENCY: 0
HEMATOCHEZIA: 0
COUGH: 0

## 2020-03-02 ASSESSMENT — MIFFLIN-ST. JEOR: SCORE: 1348.18

## 2020-03-02 NOTE — NURSING NOTE
Prior to immunization administration, verified patients identity using patient s name and date of birth. Please see Immunization Activity for additional information.     Screening Questionnaire for Adult Immunization    Are you sick today?   No   Do you have allergies to medications, food, a vaccine component or latex?   No   Have you ever had a serious reaction after receiving a vaccination?   No   Do you have a long-term health problem with heart, lung, kidney, or metabolic disease (e.g., diabetes), asthma, a blood disorder, no spleen, complement component deficiency, a cochlear implant, or a spinal fluid leak?  Are you on long-term aspirin therapy?   No   Do you have cancer, leukemia, HIV/AIDS, or any other immune system problem?   No   Do you have a parent, brother, or sister with an immune system problem?   No   In the past 3 months, have you taken medications that affect  your immune system, such as prednisone, other steroids, or anticancer drugs; drugs for the treatment of rheumatoid arthritis, Crohn s disease, or psoriasis; or have you had radiation treatments?   No   Have you had a seizure, or a brain or other nervous system problem?   No   During the past year, have you received a transfusion of blood or blood    products, or been given immune (gamma) globulin or antiviral drug?   No   For women: Are you pregnant or is there a chance you could become       pregnant during the next month?   No   Have you received any vaccinations in the past 4 weeks?   No     Immunization questionnaire answers were all negative.        Per orders of Dr. Dickson, injection of TDAP given by Carlos Ramirez CMA. Patient instructed to remain in clinic for 15 minutes afterwards, and to report any adverse reaction to me immediately.       Screening performed by Carlos Ramirez CMA on 3/2/2020 at 10:24 AM.

## 2020-03-02 NOTE — NURSING NOTE
"Chief Complaint   Patient presents with     Wellness Visit       Initial BP (!) 161/75   Pulse 81   Temp 98.7  F (37.1  C) (Oral)   Resp 20   Ht 1.626 m (5' 4\")   Wt 82.8 kg (182 lb 9.3 oz)   BMI 31.34 kg/m   Estimated body mass index is 31.34 kg/m  as calculated from the following:    Height as of this encounter: 1.626 m (5' 4\").    Weight as of this encounter: 82.8 kg (182 lb 9.3 oz).  Medication Reconciliation: complete  Carlos Brooks CMA    "

## 2020-03-03 ENCOUNTER — HOSPITAL ENCOUNTER (OUTPATIENT)
Facility: AMBULATORY SURGERY CENTER | Age: 65
End: 2020-03-03
Attending: SURGERY | Admitting: SURGERY
Payer: COMMERCIAL

## 2020-03-11 ENCOUNTER — MYC MEDICAL ADVICE (OUTPATIENT)
Dept: FAMILY MEDICINE | Facility: CLINIC | Age: 65
End: 2020-03-11

## 2020-03-11 DIAGNOSIS — G43.909 MIGRAINE WITHOUT STATUS MIGRAINOSUS, NOT INTRACTABLE, UNSPECIFIED MIGRAINE TYPE: ICD-10-CM

## 2020-03-12 RX ORDER — SUMATRIPTAN 50 MG/1
50 TABLET, FILM COATED ORAL
Qty: 30 TABLET | Refills: 1 | Status: SHIPPED | OUTPATIENT
Start: 2020-03-12 | End: 2020-07-24

## 2020-07-24 DIAGNOSIS — I10 HYPERTENSION GOAL BP (BLOOD PRESSURE) < 150/90: ICD-10-CM

## 2020-07-24 DIAGNOSIS — G43.909 MIGRAINE WITHOUT STATUS MIGRAINOSUS, NOT INTRACTABLE, UNSPECIFIED MIGRAINE TYPE: ICD-10-CM

## 2020-07-24 RX ORDER — SUMATRIPTAN 50 MG/1
TABLET, FILM COATED ORAL
Qty: 27 TABLET | Refills: 1 | Status: SHIPPED | OUTPATIENT
Start: 2020-07-24 | End: 2020-12-17

## 2020-07-24 RX ORDER — AMLODIPINE BESYLATE 10 MG/1
TABLET ORAL
Qty: 90 TABLET | Refills: 0 | Status: SHIPPED | OUTPATIENT
Start: 2020-07-24 | End: 2020-10-07

## 2020-07-24 RX ORDER — LISINOPRIL 10 MG/1
TABLET ORAL
Qty: 90 TABLET | Refills: 0 | Status: SHIPPED | OUTPATIENT
Start: 2020-07-24 | End: 2020-10-07

## 2020-11-04 ENCOUNTER — MYC MEDICAL ADVICE (OUTPATIENT)
Dept: FAMILY MEDICINE | Facility: CLINIC | Age: 65
End: 2020-11-04

## 2020-11-05 NOTE — TELEPHONE ENCOUNTER
Patient is wondering if she can do any other option for colon cancer screening due to COVID. See my chart message from patient.  AMRLENE Whipple

## 2020-11-09 ENCOUNTER — DOCUMENTATION ONLY (OUTPATIENT)
Dept: LAB | Facility: CLINIC | Age: 65
End: 2020-11-09

## 2020-11-09 DIAGNOSIS — R73.01 ELEVATED FASTING GLUCOSE: Primary | ICD-10-CM

## 2020-11-09 DIAGNOSIS — I10 HYPERTENSION GOAL BP (BLOOD PRESSURE) < 150/90: ICD-10-CM

## 2020-11-09 DIAGNOSIS — R35.0 URINARY FREQUENCY: ICD-10-CM

## 2020-11-09 NOTE — PROGRESS NOTES
Sanaz Awad is a 65 year old female who presents to clinic today for the following health issues:    HPI         Genitourinary - Female  Onset/Duration: 2 weeks   Description:   Painful urination (Dysuria): after urination            Frequency: no  Blood in urine (Hematuria): no  Delay in urine (Hesitency): no  Intensity: moderate  Progression of Symptoms:  worsening  Accompanying Signs & Symptoms:  Fever/chills: no  Flank pain: no  Nausea and vomiting: no  Vaginal symptoms:  Itching- occasionally   Abdominal/Pelvic Pain: no  History:   History of frequent UTI s: no  History of kidney stones: no  Sexually Active: YES  Possibility of pregnancy: No  Precipitating or alleviating factors: None  Therapies tried and outcome:  None      Labs reviewed in EPIC  BP Readings from Last 3 Encounters:   11/11/20 132/78   03/02/20 134/66   10/22/19 134/66    Wt Readings from Last 3 Encounters:   11/11/20 82 kg (180 lb 12.8 oz)   03/02/20 82.8 kg (182 lb 9.3 oz)   02/11/19 81.9 kg (180 lb 9.6 oz)                  Patient Active Problem List   Diagnosis     Hyperlipidemia LDL goal <130     Hypertension goal BP (blood pressure) < 150/90     Advanced directives, counseling/discussion     Osteopenia     GERD (gastroesophageal reflux disease)     Family history of breast cancer     Migraine without status migrainosus, not intractable, unspecified migraine type     Alopecia     Elevated fasting glucose     Vitamin D deficiency     Past Surgical History:   Procedure Laterality Date     BREAST BIOPSY, CORE RT/LT  1990     COLONOSCOPY       ESOPHAGOSCOPY, GASTROSCOPY, DUODENOSCOPY (EGD), COMBINED  9/17/2012    Procedure: COMBINED ESOPHAGOSCOPY, GASTROSCOPY, DUODENOSCOPY (EGD), BIOPSY SINGLE OR MULTIPLE;  EGD-GERD;  Surgeon: Teo Alvarado MD;  Location: [facility] OR     HYSTERECTOMY, PAP NO LONGER INDICATED  09/22/2006    vaginal hyst with bilateral sal-ooph, HSIL     LIGATN/STRIP LONG OR SHORT SAPHEN  11/09    right  leg     SURGICAL HISTORY OF -   2006    conization for abnl PAP       Social History     Tobacco Use     Smoking status: Never Smoker     Smokeless tobacco: Never Used   Substance Use Topics     Alcohol use: No     Family History   Problem Relation Age of Onset     Heart Disease Mother         CHF     Hypertension Mother      Lipids Mother      Neurologic Disorder Mother 30        migraines     Alzheimer Disease Mother 67     Cancer Maternal Grandmother      Hypertension Brother      Hypertension Brother      Thyroid Disease Paternal Grandmother      Breast Cancer Paternal Grandmother      Cancer Father 76        Pancreatic ca age 76     Other Cancer Father         pancreatic     Breast Cancer Maternal Aunt          Current Outpatient Medications   Medication Sig Dispense Refill     amLODIPine (NORVASC) 10 MG tablet TAKE 1 TABLET EVERY DAY 90 tablet 0     amoxicillin (AMOXIL) 500 MG capsule Take 1 capsule (500 mg) by mouth 3 times daily for 7 days 21 capsule 0     aspirin 81 MG tablet Take 1 tablet by mouth daily.       Calcium Carbonate-Vitamin D (CALCIUM 500 + D PO) Take  by mouth. One tab three time a day  .        lisinopril (ZESTRIL) 10 MG tablet TAKE 1 TABLET EVERY DAY 90 tablet 0     MULTI-DAY OR daily       omeprazole (PRILOSEC) 20 MG DR capsule TAKE 1 CAPSULE EVERY DAY 90 capsule 3     pravastatin (PRAVACHOL) 40 MG tablet TAKE 1 TABLET EVERY DAY 90 tablet 3     sertraline (ZOLOFT) 100 MG tablet TAKE 1 AND 1/2 TABLETS BY MOUTH ONCE DAILY 135 tablet 1     SUMAtriptan (IMITREX) 50 MG tablet TAKE 1 TABLET  AT ONSET OF HEADACHE FOR MIGRAINE MAY REPEAT IN 2 HOURS. MAX 4 TABLETS/24 HOURS. 27 tablet 1     VITAMIN D 1000 UNIT OR TABS 1 TABLET DAILY          Review of Systems   Constitutional, HEENT, cardiovascular, pulmonary, gi and gu systems are negative, except as otherwise noted.      Objective    /78   Pulse 66   Temp 97.8  F (36.6  C) (Tympanic)   Resp 20   Wt 82 kg (180 lb 12.8 oz)   SpO2 97%    "BMI 31.03 kg/m    Body mass index is 31.03 kg/m .  Physical Exam   GENERAL: healthy, alert and no distress  EYES: Eyes grossly normal to inspection, PERRL and conjunctivae and sclerae normal  RESP: lungs clear to auscultation - no rales, rhonchi or wheezes  CV: regular rate and rhythm, normal S1 S2, no S3 or S4, no murmur, click or rub, no peripheral edema and peripheral pulses strong  ABDOMEN: soft, nontender, no hepatosplenomegaly, no masses and bowel sounds normal  MS: no gross musculoskeletal defects noted, no edema  SKIN: no suspicious lesions or rashes  BACK: no CVA tenderness, no paralumbar tenderness  PSYCH: mentation appears normal, affect normal/bright    UA RESULTS:  Recent Labs   Lab Test 11/11/20  0820   COLOR Yellow   APPEARANCE Clear   URINEGLC Negative   URINEBILI Negative   URINEKETONE Trace*   SG 1.025   UBLD Negative   URINEPH 6.0   PROTEIN Negative   UROBILINOGEN 0.2   NITRITE Negative   LEUKEST Trace*   RBCU O - 2   WBCU 5-10*              Assessment & Plan     (N30.01) Acute cystitis with hematuria  (primary encounter diagnosis)  Comment: suspicious UA   Plan: UA reflex to Microscopic and Culture, Urine         Microscopic, Urine Culture Aerobic Bacterial,         amoxicillin (AMOXIL) 500 MG capsule        Start treatment due to symptoms, await ucx results to modify therapy if needed    (I10) Hypertension goal BP (blood pressure) < 150/90  Comment: to goal  Plan: amLODIPine (NORVASC) 10 MG tablet, lisinopril         (ZESTRIL) 10 MG tablet         Refill x 6 months recent labs normal        BMI:   Estimated body mass index is 31.03 kg/m  as calculated from the following:    Height as of 3/2/20: 1.626 m (5' 4\").    Weight as of this encounter: 82 kg (180 lb 12.8 oz).   Weight management plan: Discussed healthy diet and exercise guidelines         See Patient Instructions    Return in about 1 week (around 11/18/2020) for if not improved or symptoms worsen.    Joy Dickson MD  [facility] " [facility]

## 2020-11-09 NOTE — PROGRESS NOTES
Please review lab orders sign and close encounter. Charo Fitzpatrick MA/MARLENE    [facility] appt 11/11/20

## 2020-11-09 NOTE — PROGRESS NOTES
.Please place or confirm pending lab orders for upcoming lab appointment on 11/10/20  Thank you,  Francine

## 2020-11-11 ENCOUNTER — OFFICE VISIT (OUTPATIENT)
Dept: FAMILY MEDICINE | Facility: CLINIC | Age: 65
End: 2020-11-11
Payer: COMMERCIAL

## 2020-11-11 VITALS
RESPIRATION RATE: 20 BRPM | HEART RATE: 66 BPM | BODY MASS INDEX: 31.03 KG/M2 | OXYGEN SATURATION: 97 % | DIASTOLIC BLOOD PRESSURE: 78 MMHG | WEIGHT: 180.8 LBS | SYSTOLIC BLOOD PRESSURE: 132 MMHG | TEMPERATURE: 97.8 F

## 2020-11-11 DIAGNOSIS — I10 HYPERTENSION GOAL BP (BLOOD PRESSURE) < 150/90: ICD-10-CM

## 2020-11-11 DIAGNOSIS — R35.0 URINARY FREQUENCY: ICD-10-CM

## 2020-11-11 DIAGNOSIS — R73.01 ELEVATED FASTING GLUCOSE: ICD-10-CM

## 2020-11-11 DIAGNOSIS — N30.01 ACUTE CYSTITIS WITH HEMATURIA: Primary | ICD-10-CM

## 2020-11-11 LAB
ALBUMIN UR-MCNC: NEGATIVE MG/DL
AMORPH CRY #/AREA URNS HPF: ABNORMAL /HPF
ANION GAP SERPL CALCULATED.3IONS-SCNC: 5 MMOL/L (ref 3–14)
APPEARANCE UR: CLEAR
BACTERIA #/AREA URNS HPF: ABNORMAL /HPF
BILIRUB UR QL STRIP: NEGATIVE
BUN SERPL-MCNC: 20 MG/DL (ref 7–30)
CALCIUM SERPL-MCNC: 9.7 MG/DL (ref 8.5–10.1)
CHLORIDE SERPL-SCNC: 103 MMOL/L (ref 94–109)
CO2 SERPL-SCNC: 29 MMOL/L (ref 20–32)
COLOR UR AUTO: YELLOW
CREAT SERPL-MCNC: 0.82 MG/DL (ref 0.52–1.04)
GFR SERPL CREATININE-BSD FRML MDRD: 73 ML/MIN/{1.73_M2}
GLUCOSE SERPL-MCNC: 107 MG/DL (ref 70–99)
GLUCOSE UR STRIP-MCNC: NEGATIVE MG/DL
HBA1C MFR BLD: 6 % (ref 0–5.6)
HGB UR QL STRIP: NEGATIVE
HYALINE CASTS #/AREA URNS LPF: ABNORMAL /LPF
KETONES UR STRIP-MCNC: ABNORMAL MG/DL
LEUKOCYTE ESTERASE UR QL STRIP: ABNORMAL
MUCOUS THREADS #/AREA URNS LPF: PRESENT /LPF
NITRATE UR QL: NEGATIVE
NON-SQ EPI CELLS #/AREA URNS LPF: ABNORMAL /LPF
PH UR STRIP: 6 PH (ref 5–7)
POTASSIUM SERPL-SCNC: 3.9 MMOL/L (ref 3.4–5.3)
RBC #/AREA URNS AUTO: ABNORMAL /HPF
SODIUM SERPL-SCNC: 137 MMOL/L (ref 133–144)
SOURCE: ABNORMAL
SP GR UR STRIP: 1.02 (ref 1–1.03)
UROBILINOGEN UR STRIP-ACNC: 0.2 EU/DL (ref 0.2–1)
WBC #/AREA URNS AUTO: ABNORMAL /HPF

## 2020-11-11 PROCEDURE — 99214 OFFICE O/P EST MOD 30 MIN: CPT | Performed by: FAMILY MEDICINE

## 2020-11-11 PROCEDURE — 81001 URINALYSIS AUTO W/SCOPE: CPT | Performed by: FAMILY MEDICINE

## 2020-11-11 PROCEDURE — 83036 HEMOGLOBIN GLYCOSYLATED A1C: CPT | Performed by: FAMILY MEDICINE

## 2020-11-11 PROCEDURE — 87086 URINE CULTURE/COLONY COUNT: CPT | Performed by: FAMILY MEDICINE

## 2020-11-11 PROCEDURE — 36415 COLL VENOUS BLD VENIPUNCTURE: CPT | Performed by: FAMILY MEDICINE

## 2020-11-11 PROCEDURE — 80048 BASIC METABOLIC PNL TOTAL CA: CPT | Performed by: FAMILY MEDICINE

## 2020-11-11 RX ORDER — LISINOPRIL 10 MG/1
TABLET ORAL
Qty: 90 TABLET | Refills: 0 | Status: SHIPPED | OUTPATIENT
Start: 2020-11-11 | End: 2020-12-17

## 2020-11-11 RX ORDER — AMLODIPINE BESYLATE 10 MG/1
TABLET ORAL
Qty: 90 TABLET | Refills: 0 | Status: SHIPPED | OUTPATIENT
Start: 2020-11-11 | End: 2020-12-17

## 2020-11-11 RX ORDER — AMOXICILLIN 500 MG/1
500 CAPSULE ORAL 3 TIMES DAILY
Qty: 21 CAPSULE | Refills: 0 | Status: SHIPPED | OUTPATIENT
Start: 2020-11-11 | End: 2020-11-18

## 2020-11-11 NOTE — NURSING NOTE
"Chief Complaint   Patient presents with     Dysuria       Initial /78   Pulse 66   Temp 97.8  F (36.6  C) (Tympanic)   Resp 20   Wt 82 kg (180 lb 12.8 oz)   SpO2 97%   BMI 31.03 kg/m   Estimated body mass index is 31.03 kg/m  as calculated from the following:    Height as of 3/2/20: 1.626 m (5' 4\").    Weight as of this encounter: 82 kg (180 lb 12.8 oz).  Medication Reconciliation: complete  Carlos Brooks CMA    "

## 2020-11-12 LAB
BACTERIA SPEC CULT: NORMAL
Lab: NORMAL
SPECIMEN SOURCE: NORMAL

## 2020-11-17 ENCOUNTER — TRANSFERRED RECORDS (OUTPATIENT)
Dept: HEALTH INFORMATION MANAGEMENT | Facility: CLINIC | Age: 65
End: 2020-11-17

## 2020-11-17 LAB — COLOGUARD-ABSTRACT: NEGATIVE

## 2020-11-18 NOTE — PROGRESS NOTES
"Nannette Awad is a 65 year old female who is being evaluated via a billable telephone visit.      The patient has been notified of following:     \"This telephone visit will be conducted via a call between you and your physician/provider. We have found that certain health care needs can be provided without the need for a physical exam.  This service lets us provide the care you need with a short phone conversation.  If a prescription is necessary we can send it directly to your pharmacy.  If lab work is needed we can place an order for that and you can then stop by our lab to have the test done at a later time.    Telephone visits are billed at different rates depending on your insurance coverage. During this emergency period, for some insurers they may be billed the same as an in-person visit.  Please reach out to your insurance provider with any questions.    If during the course of the call the physician/provider feels a telephone visit is not appropriate, you will not be charged for this service.\"    Patient has given verbal consent for Telephone visit?  Yes    What phone number would you like to be contacted at? [phone]    How would you like to obtain your AVS? Radha Yo     Nannette Awad is a 65 year old female who presents via phone visit today for the following health issues:    HPI     Medication Followup of Amoxicillin for acute cystitis with hematuria     Taking Medication as prescribed: yes    Side Effects:  None    Medication Helping Symptoms:  In the beginning it help, but after a couple of days symptoms came back     Pt reports she has burning on the outside when she urinates.  No increased frequency or urgency.  No abdominal pain, no f/c/s  No back pain.    Labs reviewed in EPIC  BP Readings from Last 3 Encounters:   11/11/20 132/78   03/02/20 134/66   10/22/19 134/66    Wt Readings from Last 3 Encounters:   11/11/20 82 kg (180 lb 12.8 oz)   03/02/20 82.8 kg (182 lb 9.3 oz) "   02/11/19 81.9 kg (180 lb 9.6 oz)                  Patient Active Problem List   Diagnosis     Hyperlipidemia LDL goal <130     Hypertension goal BP (blood pressure) < 150/90     Advanced directives, counseling/discussion     Osteopenia     GERD (gastroesophageal reflux disease)     Family history of breast cancer     Migraine without status migrainosus, not intractable, unspecified migraine type     Alopecia     Elevated fasting glucose     Vitamin D deficiency     Past Surgical History:   Procedure Laterality Date     BREAST BIOPSY, CORE RT/LT  1990     COLONOSCOPY       ESOPHAGOSCOPY, GASTROSCOPY, DUODENOSCOPY (EGD), COMBINED  9/17/2012    Procedure: COMBINED ESOPHAGOSCOPY, GASTROSCOPY, DUODENOSCOPY (EGD), BIOPSY SINGLE OR MULTIPLE;  EGD-GERD;  Surgeon: Teo Alvarado MD;  Location: [facility] OR     HYSTERECTOMY, PAP NO LONGER INDICATED  09/22/2006    vaginal hyst with bilateral sal-ooph, HSIL     LIGATN/STRIP LONG OR SHORT SAPHEN  11/09    right leg     SURGICAL HISTORY OF -   2006    conization for abnl PAP       Social History     Tobacco Use     Smoking status: Never Smoker     Smokeless tobacco: Never Used   Substance Use Topics     Alcohol use: No     Family History   Problem Relation Age of Onset     Heart Disease Mother         CHF     Hypertension Mother      Lipids Mother      Neurologic Disorder Mother 30        migraines     Alzheimer Disease Mother 67     Cancer Maternal Grandmother      Hypertension Brother      Hypertension Brother      Thyroid Disease Paternal Grandmother      Breast Cancer Paternal Grandmother      Cancer Father 76        Pancreatic ca age 76     Other Cancer Father         pancreatic     Breast Cancer Maternal Aunt          Current Outpatient Medications   Medication Sig Dispense Refill     amLODIPine (NORVASC) 10 MG tablet TAKE 1 TABLET EVERY DAY 90 tablet 0     aspirin 81 MG tablet Take 1 tablet by mouth daily.       Calcium Carbonate-Vitamin D (CALCIUM 500 + D PO) Take  by  mouth. One tab three time a day  .        lisinopril (ZESTRIL) 10 MG tablet TAKE 1 TABLET EVERY DAY 90 tablet 0     MULTI-DAY OR daily       nystatin (MYCOSTATIN) 739398 UNIT/GM external cream Apply topically 2 times daily 30 g 0     omeprazole (PRILOSEC) 20 MG DR capsule TAKE 1 CAPSULE EVERY DAY 90 capsule 3     pravastatin (PRAVACHOL) 40 MG tablet TAKE 1 TABLET EVERY DAY 90 tablet 3     sertraline (ZOLOFT) 100 MG tablet TAKE 1 AND 1/2 TABLETS BY MOUTH ONCE DAILY 135 tablet 1     SUMAtriptan (IMITREX) 50 MG tablet TAKE 1 TABLET  AT ONSET OF HEADACHE FOR MIGRAINE MAY REPEAT IN 2 HOURS. MAX 4 TABLETS/24 HOURS. 27 tablet 1     triamcinolone (KENALOG) 0.1 % external cream Apply topically 2 times daily 30 g 0     VITAMIN D 1000 UNIT OR TABS 1 TABLET DAILY                Review of Systems   Constitutional, HEENT, cardiovascular, pulmonary, gi and gu systems are negative, except as otherwise noted.       Objective          Vitals:  No vitals were obtained today due to virtual visit.    healthy, alert and no distress  PSYCH: Alert and oriented times 3; coherent speech, normal   rate and volume, able to articulate logical thoughts, able   to abstract reason, no tangential thoughts, no hallucinations   or delusions  Her affect is normal  RESP: No cough, no audible wheezing, able to talk in full sentences  Remainder of exam unable to be completed due to telephone visits          UA RESULTS:  Recent Labs   Lab Test 11/11/20  0820   COLOR Yellow   APPEARANCE Clear   URINEGLC Negative   URINEBILI Negative   URINEKETONE Trace*   SG 1.025   UBLD Negative   URINEPH 6.0   PROTEIN Negative   UROBILINOGEN 0.2   NITRITE Negative   LEUKEST Trace*   RBCU O - 2   WBCU 5-10*     UCx - no growth 11/11/20  Assessment/Plan:    Assessment & Plan     (N76.2) Acute vulvitis  (primary encounter diagnosis)  Comment: suspect vulvitis is likely etiology of symptoms with worsening during urination   Plan: triamcinolone (KENALOG) 0.1 % external cream,          nystatin (MYCOSTATIN) 171141 UNIT/GM external         cream        Treat with topical kenalog/nystatin 2-3 times daily for up to 2 weeks  If no improvement, will need OV to assess.   Can mix creams or apply separately with nystatin first if layering         See Patient Instructions    No follow-ups on file.    Joy Dickson MD  [facility]    Phone call duration:  6 minutes

## 2020-11-19 ENCOUNTER — VIRTUAL VISIT (OUTPATIENT)
Dept: FAMILY MEDICINE | Facility: CLINIC | Age: 65
End: 2020-11-19
Payer: COMMERCIAL

## 2020-11-19 DIAGNOSIS — N76.2 ACUTE VULVITIS: Primary | ICD-10-CM

## 2020-11-19 PROCEDURE — 99213 OFFICE O/P EST LOW 20 MIN: CPT | Mod: 95 | Performed by: FAMILY MEDICINE

## 2020-11-19 RX ORDER — TRIAMCINOLONE ACETONIDE 1 MG/G
CREAM TOPICAL 2 TIMES DAILY
Qty: 30 G | Refills: 0 | Status: SHIPPED | OUTPATIENT
Start: 2020-11-19 | End: 2020-12-01

## 2020-11-19 RX ORDER — NYSTATIN 100000 U/G
CREAM TOPICAL 2 TIMES DAILY
Qty: 30 G | Refills: 0 | Status: SHIPPED | OUTPATIENT
Start: 2020-11-19 | End: 2020-12-01

## 2020-11-24 ENCOUNTER — TELEPHONE (OUTPATIENT)
Dept: FAMILY MEDICINE | Facility: CLINIC | Age: 65
End: 2020-11-24

## 2020-11-24 NOTE — LETTER
November 24, 2020      Nannette Awad  [address]  [facility] [address]      Dear Nannette,    The result of your recent Cologuard testing was negative. A negative result means that Cologuard did not detect significant levels of DNA and/or hemoglobin biomarkers in the stool which are associated with colon cancer or precancer.    Thank you for completing your screening, your next screening should be completed in 3 years.      If you have any questions or concerns, please contact your care team at [phone].     Sincerely,  [facility]

## 2020-11-24 NOTE — TELEPHONE ENCOUNTER
Negative Cologuard results received, Placed in providers basket.  Letter sent to patient, faxed to stat scanning.  MARLENE Whipple

## 2020-11-27 ENCOUNTER — OFFICE VISIT (OUTPATIENT)
Dept: FAMILY MEDICINE | Facility: CLINIC | Age: 65
End: 2020-11-27
Payer: COMMERCIAL

## 2020-11-27 VITALS
WEIGHT: 180 LBS | OXYGEN SATURATION: 99 % | HEIGHT: 64 IN | RESPIRATION RATE: 18 BRPM | TEMPERATURE: 97.7 F | BODY MASS INDEX: 30.73 KG/M2 | DIASTOLIC BLOOD PRESSURE: 70 MMHG | SYSTOLIC BLOOD PRESSURE: 128 MMHG | HEART RATE: 79 BPM

## 2020-11-27 DIAGNOSIS — R30.0 DYSURIA: ICD-10-CM

## 2020-11-27 DIAGNOSIS — N89.8 VAGINAL IRRITATION: Primary | ICD-10-CM

## 2020-11-27 LAB
ALBUMIN UR-MCNC: NEGATIVE MG/DL
APPEARANCE UR: CLEAR
BILIRUB UR QL STRIP: NEGATIVE
COLOR UR AUTO: YELLOW
GLUCOSE UR STRIP-MCNC: NEGATIVE MG/DL
HGB UR QL STRIP: NEGATIVE
KETONES UR STRIP-MCNC: NEGATIVE MG/DL
LEUKOCYTE ESTERASE UR QL STRIP: NEGATIVE
NITRATE UR QL: NEGATIVE
PH UR STRIP: 5.5 PH (ref 5–7)
SOURCE: NORMAL
SP GR UR STRIP: 1.01 (ref 1–1.03)
SPECIMEN SOURCE: NORMAL
UROBILINOGEN UR STRIP-ACNC: 0.2 EU/DL (ref 0.2–1)
WET PREP SPEC: NORMAL

## 2020-11-27 PROCEDURE — 87210 SMEAR WET MOUNT SALINE/INK: CPT | Performed by: NURSE PRACTITIONER

## 2020-11-27 PROCEDURE — 99214 OFFICE O/P EST MOD 30 MIN: CPT | Performed by: NURSE PRACTITIONER

## 2020-11-27 PROCEDURE — 81003 URINALYSIS AUTO W/O SCOPE: CPT | Performed by: NURSE PRACTITIONER

## 2020-11-27 ASSESSMENT — MIFFLIN-ST. JEOR: SCORE: 1336.47

## 2020-11-27 NOTE — PROGRESS NOTES
"Sanaz Awad is a 65 year old female who presents to clinic today for the following health issues:    HPI         Genitourinary - Female  Onset/Duration: 1 month  Description:   Painful urination (Dysuria): YES           Frequency: no  Blood in urine (Hematuria): no  Delay in urine (Hesitency): no  Intensity: moderate  Progression of Symptoms:  same  Accompanying Signs & Symptoms:  Fever/chills: no  Flank pain: no  Nausea and vomiting: no  Vaginal symptoms: none  Abdominal/Pelvic Pain: no  History:   History of frequent UTI s: no  History of kidney stones: no  Sexually Active: no  Possibility of pregnancy: No  Precipitating oralleviating factors:   Therapies tried and outcome: kenalog/nystatin and Amoxicillin  -not helping       Patient experiencing vaginal burning for the last month.     Seen on 11/11/2020 reporting 2 weeks of burning with urination and occasional vaginal itching.  UA showed signs of possible UTI.  She was placed on amoxicillin.  Culture showed mixed urogenital thony.  Patient had virtual visit on 11/19/2020 reporting ongoing burning.  No fevers, chills, back pain.  Pain was more externally, when urine touched her skin.   She was started on external kenalog/nystatin.  Patient denies any improvement since that time.  States she feels like burning is inside her vagina.  Keeping her up at night.  Denies any vaginal discharge.  No abdominal pain, nausea, vomiting, back pain or fevers.   No OTC treatments tried.       Review of Systems   Constitutional, HEENT, cardiovascular, pulmonary, gi and gu systems are negative, except as otherwise noted.      Objective    /70   Pulse 79   Temp 97.7  F (36.5  C) (Tympanic)   Resp 18   Ht 1.626 m (5' 4\")   Wt 81.6 kg (180 lb)   SpO2 99%   BMI 30.90 kg/m    Body mass index is 30.9 kg/m .  Physical Exam   GENERAL: healthy, alert and no distress  EYES: Eyes grossly normal to inspection, PERRL and conjunctivae and sclerae normal  HENT: " ear canals and TM's normal, nose and mouth without ulcers or lesions  NECK: no adenopathy, no asymmetry, masses, or scars and thyroid normal to palpation  RESP: lungs clear to auscultation - no rales, rhonchi or wheezes  CV: regular rate and rhythm, normal S1 S2, no S3 or S4, no murmur, click or rub, no peripheral edema and peripheral pulses strong  ABDOMEN: soft, nontender, no hepatosplenomegaly, no masses and bowel sounds normal   (female): Mild erythema to external genitalia, vaginal mucosa pink and moist, no obvious lesions, limited exam as patient did not tolerate speculum exam well due to discomfort- was not able to visualize cervix.   MS: no gross musculoskeletal defects noted, no edema  SKIN: no suspicious lesions or rashes  NEURO: Normal strength and tone, mentation intact and speech normal  PSYCH: mentation appears normal, affect normal/bright    Component  4:34 PM   Specimen Description Vagina    Wet Prep No yeast seen    Wet Prep No clue cells seen    Wet Prep No Trichomonas seen    Wet Prep Few   WBC'S seen        UA RESULTS:  Recent Labs   Lab Test 11/27/20  1604 11/11/20  0820   COLOR Yellow Yellow   APPEARANCE Clear Clear   URINEGLC Negative Negative   URINEBILI Negative Negative   URINEKETONE Negative Trace*   SG 1.010 1.025   UBLD Negative Negative   URINEPH 5.5 6.0   PROTEIN Negative Negative   UROBILINOGEN 0.2 0.2   NITRITE Negative Negative   LEUKEST Negative Trace*   RBCU  --  O - 2   WBCU  --  5-10*             Assessment & Plan     Nannette was seen today for urinary problem.    Diagnoses and all orders for this visit:    Vaginal irritation  -     Wet prep  -     OB/GYN REFERRAL    Dysuria  -     UA reflex to Microscopic and Culture    UA and wet prep normal.  No improvement with previous treatments.   Referral to OB/GYN.     See Patient Instructions  Patient Instructions   Urine and vaginal swab were normal.      Referral placed to OBGYN.         Return in about 2 weeks (around 12/11/2020)  for If failure to improve.    Mandy Calloway, CNP  [facility]

## 2020-12-01 ENCOUNTER — OFFICE VISIT (OUTPATIENT)
Dept: OBGYN | Facility: CLINIC | Age: 65
End: 2020-12-01
Payer: COMMERCIAL

## 2020-12-01 VITALS
DIASTOLIC BLOOD PRESSURE: 73 MMHG | SYSTOLIC BLOOD PRESSURE: 163 MMHG | BODY MASS INDEX: 30.93 KG/M2 | WEIGHT: 181.2 LBS | HEART RATE: 71 BPM | HEIGHT: 64 IN | OXYGEN SATURATION: 97 % | TEMPERATURE: 98.1 F

## 2020-12-01 DIAGNOSIS — N95.2 ATROPHIC VAGINITIS: Primary | ICD-10-CM

## 2020-12-01 PROCEDURE — 99203 OFFICE O/P NEW LOW 30 MIN: CPT | Performed by: NURSE PRACTITIONER

## 2020-12-01 RX ORDER — ESTRADIOL 10 UG/1
INSERT VAGINAL
Qty: 42 TABLET | Refills: 2 | Status: SHIPPED | OUTPATIENT
Start: 2020-12-01 | End: 2020-12-01

## 2020-12-01 RX ORDER — ESTRADIOL 0.1 MG/G
CREAM VAGINAL
Qty: 42.5 G | Refills: 3 | Status: SHIPPED | OUTPATIENT
Start: 2020-12-01 | End: 2021-02-15

## 2020-12-01 ASSESSMENT — MIFFLIN-ST. JEOR: SCORE: 1341.92

## 2020-12-01 ASSESSMENT — PAIN SCALES - GENERAL: PAINLEVEL: NO PAIN (0)

## 2020-12-01 NOTE — PROGRESS NOTES
"Sanaz Awad is a 65 year old female who presents to clinic today for the following health issues:    HPI         Consult vaginal irritation    Patient saw her PCP for urinary/vaginal symptoms about 3 weeks ago-at that time symptoms had been ongoing for 2 weeks.  Treated for a urinary tract infection with Amoxicillin. Urine culture was negative. Antibiotics did not improve symptoms. Had a virtual visit about a week later and diagnosed with acute vulvitis and given prescription for topical Nystatin and Kenalog to use together. Tried it for 10 days with no improvement in symptoms. Saw alternate provider last week with continued symptoms. Wet prep and UA negative.   Patient states symptoms all internal in the vagina. No vulvar/external symptoms. Feels like she is on fire and very dry. Denies vaginal discharge, odor, itching, bleeding. No abnormal urinary symptoms. No new products, use of pools/hot tubs/bubble baths.   Patient had a hysterectomy with BSO in 2006 for abnormal pap smear-HSIL. No hormone replacement in years.     Review of Systems   Constitutional, HEENT, cardiovascular, pulmonary, gi and gu systems are negative, except as otherwise noted.      Objective    BP (!) 163/73 (BP Location: Right arm, Patient Position: Sitting, Cuff Size: Adult Regular)   Pulse 71   Temp 98.1  F (36.7  C) (Tympanic)   Ht 1.626 m (5' 4\")   Wt 82.2 kg (181 lb 3.2 oz)   SpO2 97%   BMI 31.10 kg/m    Body mass index is 31.1 kg/m .  Physical Exam   GENERAL: healthy, alert and no distress  ABDOMEN: soft, nontender, no hepatosplenomegaly, no masses and bowel sounds normal   (female): normal female external genitalia, normal urethral meatus , vaginal mucosal atrophy, normal cervix, adnexae, and uterus without masses. and normal post-hysterectomy exam without masses.   MS: no gross musculoskeletal defects noted, no edema  SKIN: no suspicious lesions or rashes  NEURO: Normal strength and tone, mentation intact and " speech normal  PSYCH: mentation appears normal, affect normal/bright     Assessment & Plan     Atrophic vaginitis  We discussed her symptoms and exam. We reviewed possible etiologies. As symptoms mostly vaginal at this time, recommend we start with vaginal estrogen to see if this helps. Discussed use nightly for 2 weeks, then reduce to three times weekly, then will plan to reduce to twice weekly if symptoms managed. In 2 weeks if no improvement in symptoms at all, to send [employer] message and will plan follow up with GYN physician. No intercourse for 2-4 weeks. Additional relief measures to try as well discussed. Patient is given an opportunity to ask questions and have them answered.  - estradiol (ESTRACE) 0.1 MG/GM vaginal cream; Place 2 grams vaginally at night for 14 nights. Then reduce to 2-3 times/week      [facility]

## 2020-12-06 ENCOUNTER — HEALTH MAINTENANCE LETTER (OUTPATIENT)
Age: 65
End: 2020-12-06

## 2020-12-12 DIAGNOSIS — I10 HYPERTENSION GOAL BP (BLOOD PRESSURE) < 150/90: ICD-10-CM

## 2020-12-15 RX ORDER — AMLODIPINE BESYLATE 10 MG/1
TABLET ORAL
Qty: 90 TABLET | Refills: 0 | OUTPATIENT
Start: 2020-12-15

## 2020-12-15 RX ORDER — LISINOPRIL 10 MG/1
TABLET ORAL
Qty: 90 TABLET | Refills: 0 | OUTPATIENT
Start: 2020-12-15

## 2020-12-16 ENCOUNTER — MYC MEDICAL ADVICE (OUTPATIENT)
Dept: FAMILY MEDICINE | Facility: CLINIC | Age: 65
End: 2020-12-16

## 2020-12-16 DIAGNOSIS — I10 HYPERTENSION GOAL BP (BLOOD PRESSURE) < 150/90: ICD-10-CM

## 2020-12-16 DIAGNOSIS — R51.9 FACIAL PAIN: ICD-10-CM

## 2020-12-16 DIAGNOSIS — G43.909 MIGRAINE WITHOUT STATUS MIGRAINOSUS, NOT INTRACTABLE, UNSPECIFIED MIGRAINE TYPE: ICD-10-CM

## 2020-12-17 ENCOUNTER — MYC MEDICAL ADVICE (OUTPATIENT)
Dept: OBGYN | Facility: CLINIC | Age: 65
End: 2020-12-17

## 2020-12-17 RX ORDER — AMLODIPINE BESYLATE 10 MG/1
TABLET ORAL
Qty: 90 TABLET | Refills: 0 | Status: SHIPPED | OUTPATIENT
Start: 2020-12-17 | End: 2021-02-15

## 2020-12-17 RX ORDER — LISINOPRIL 10 MG/1
TABLET ORAL
Qty: 90 TABLET | Refills: 0 | Status: SHIPPED | OUTPATIENT
Start: 2020-12-17 | End: 2021-02-15

## 2020-12-17 RX ORDER — SUMATRIPTAN 50 MG/1
TABLET, FILM COATED ORAL
Qty: 27 TABLET | Refills: 1 | Status: SHIPPED | OUTPATIENT
Start: 2020-12-17 | End: 2021-02-15

## 2020-12-17 RX ORDER — SERTRALINE HYDROCHLORIDE 100 MG/1
TABLET, FILM COATED ORAL
Qty: 135 TABLET | Refills: 0 | Status: SHIPPED | OUTPATIENT
Start: 2020-12-17 | End: 2021-02-15

## 2020-12-17 NOTE — TELEPHONE ENCOUNTER
Pt seen 12/1/2020 for atrophic vaginitis. Pt prescribed estradiol vaginal cream.    Pt asking for possible steroid instead of estradiol cream as she is concerned that this is a hormonal cream. Pt states vaginal dryness is not new in onset.    Pt asking if she can use anything OTC for vaginal dryness. RN routing for providers advisement and if replens can be used or not in combination with prescription.    Darshana Jesus RN on 12/17/2020 at 4:35 PM

## 2021-02-11 DIAGNOSIS — Z12.31 ENCOUNTER FOR SCREENING MAMMOGRAM FOR BREAST CANCER: ICD-10-CM

## 2021-02-11 PROCEDURE — 77067 SCR MAMMO BI INCL CAD: CPT | Mod: TC | Performed by: RADIOLOGY

## 2021-02-15 DIAGNOSIS — G43.909 MIGRAINE WITHOUT STATUS MIGRAINOSUS, NOT INTRACTABLE, UNSPECIFIED MIGRAINE TYPE: ICD-10-CM

## 2021-02-15 DIAGNOSIS — I10 HYPERTENSION GOAL BP (BLOOD PRESSURE) < 150/90: ICD-10-CM

## 2021-02-15 DIAGNOSIS — R51.9 FACIAL PAIN: ICD-10-CM

## 2021-02-15 DIAGNOSIS — K21.9 GASTROESOPHAGEAL REFLUX DISEASE WITHOUT ESOPHAGITIS: Primary | ICD-10-CM

## 2021-02-15 DIAGNOSIS — E78.5 HYPERLIPIDEMIA LDL GOAL <130: ICD-10-CM

## 2021-02-15 DIAGNOSIS — N95.2 ATROPHIC VAGINITIS: ICD-10-CM

## 2021-02-15 RX ORDER — SERTRALINE HYDROCHLORIDE 100 MG/1
TABLET, FILM COATED ORAL
Qty: 135 TABLET | Refills: 1 | Status: SHIPPED | OUTPATIENT
Start: 2021-02-15 | End: 2021-07-23

## 2021-02-15 RX ORDER — ESTRADIOL 0.1 MG/G
CREAM VAGINAL
Qty: 42.5 G | Refills: 3 | Status: SHIPPED | OUTPATIENT
Start: 2021-02-15 | End: 2021-03-08

## 2021-02-15 RX ORDER — SUMATRIPTAN 50 MG/1
TABLET, FILM COATED ORAL
Qty: 27 TABLET | Refills: 1 | Status: SHIPPED | OUTPATIENT
Start: 2021-02-15 | End: 2022-03-23

## 2021-02-15 RX ORDER — AMLODIPINE BESYLATE 10 MG/1
TABLET ORAL
Qty: 90 TABLET | Refills: 1 | Status: SHIPPED | OUTPATIENT
Start: 2021-02-15 | End: 2021-07-23

## 2021-02-15 RX ORDER — PRAVASTATIN SODIUM 40 MG
TABLET ORAL
Qty: 90 TABLET | Refills: 3 | Status: SHIPPED | OUTPATIENT
Start: 2021-02-15 | End: 2022-03-23

## 2021-02-15 RX ORDER — LISINOPRIL 10 MG/1
TABLET ORAL
Qty: 90 TABLET | Refills: 1 | Status: SHIPPED | OUTPATIENT
Start: 2021-02-15 | End: 2021-07-23

## 2021-02-15 NOTE — TELEPHONE ENCOUNTER
BP Readings from Last 3 Encounters:   12/01/20 (!) 163/73   11/27/20 128/70   11/11/20 132/78       Next 5 appointments (look out 90 days)    Mar 08, 2021  7:00 AM  PHYSICAL with Joy Dickson MD  [facility] [address] [address] [facility] [place] [address] [phone]          Routing to provider to advise.  Jewell Bass BSN, RN

## 2021-02-15 NOTE — TELEPHONE ENCOUNTER
Pt needs lab appt prior to 8 Mar 2021 appt with me. Please schedule now, can be non-fasting.  Can get day of visit if pt refuses pre-visit lab appt

## 2021-02-15 NOTE — TELEPHONE ENCOUNTER
From: Nannette Awad   Sent: 2021  11:24 AM CST   To: An Reception   Subject: RE: Changing insurance for my prescriptions        Hello,   I sure hope someone can help me get my prescriptions sent to [facility], as I now have new insurance. I did contact my previous pharmacy ([facility] mail order). I was told that they could not do that for me and that I would have to contact the new pharmacy that I now would be using..[facility].     On 2021 I spoke with April at the [facility] pharmacy and while I was on the phone with her she sent Dr. Dickson a request for all new prescriptions sent to them.  They can not fill my prescriptions as they have not heard from anyone at the [facility] yet.   Please help me....I need to have my prescriptions filled.   The direct phone number for the [facility] pharmacy is [phone].      Thank you very much,      Corrina vargas 1955

## 2021-02-22 NOTE — PATIENT INSTRUCTIONS
Patient Education   Personalized Prevention Plan  You are due for the preventive services outlined below.  Your care team is available to assist you in scheduling these services.  If you have already completed any of these items, please share that information with your care team to update in your medical record.  Health Maintenance Due   Topic Date Due     FALL RISK ASSESSMENT  03/02/2021           Rest joint, ice 2-3 times a day for 15-20 minutes, use anti-inflammatory (ibuprofen 600 mg every 6 hours)

## 2021-02-22 NOTE — PROGRESS NOTES
"SUBJECTIVE:   Nannette Awad is a 65 year old female who presents for Preventive Visit.      Patient has been advised of split billing requirements and indicates understanding: Yes   Are you in the first 12 months of your Medicare coverage?  No    Healthy Habits:     In general, how would you rate your overall health?  Good    Frequency of exercise:  1 day/week    Duration of exercise:  Other    Do you usually eat at least 4 servings of fruit and vegetables a day, include whole grains    & fiber and avoid regularly eating high fat or \"junk\" foods?  Yes    Taking medications regularly:  Yes    Medication side effects:  None    Ability to successfully perform activities of daily living:  No assistance needed    Home Safety:  No safety concerns identified    Hearing Impairment:  Difficulty following a conversation in a noisy restaurant or crowded room    In the past 6 months, have you been bothered by leaking of urine? Yes    In general, how would you rate your overall mental or emotional health?  Good      PHQ-2 Total Score: 0    Additional concerns today:  Yes    Do you feel safe in your environment? Yes    Have you ever done Advance Care Planning? (For example, a Health Directive, POLST, or a discussion with a medical provider or your loved ones about your wishes): Information given to patient to review       Fall risk  Fallen 2 or more times in the past year?: No  Any fall with injury in the past year?: No    Cognitive Screening   1) Repeat 3 items (Leader, Season, Table)    2) Clock draw: NORMAL  3) 3 item recall: Recalls 3 objects  Results: NORMAL clock, 1-2 items recalled: COGNITIVE IMPAIRMENT LESS LIKELY    Mini-CogTM Copyright LETICIA Cole. Licensed by the author for use in [facility]; reprinted with permission (sofía@.[facility]). All rights reserved.      Do you have sleep apnea, excessive snoring or daytime drowsiness?: no    Reviewed and updated as needed this visit by clinical staff  Tobacco  " Allergies  Meds  Problems  Med Hx  Surg Hx  Fam Hx  Soc Hx          Reviewed and updated as needed this visit by Provider  Tobacco  Allergies  Meds  Problems  Med Hx  Surg Hx  Fam Hx         Social History     Tobacco Use     Smoking status: Never Smoker     Smokeless tobacco: Never Used   Substance Use Topics     Alcohol use: No         Alcohol Use 3/8/2021   Prescreen: >3 drinks/day or >7 drinks/week? Not Applicable   Prescreen: >3 drinks/day or >7 drinks/week? -               Current providers sharing in care for this patient include:   Patient Care Team:  Joy Dickson MD as PCP - General (Family Practice)  Joy Dickson MD as Assigned PCP  Sil Falcon APRN CNP as Assigned OBGYN Provider    The following health maintenance items are reviewed in Epic and correct as of today:  Health Maintenance   Topic Date Due     FALL RISK ASSESSMENT  03/02/2021     COVID-19 Vaccine (2 of 2 - Pfizer series) 03/28/2021     BMP  09/04/2021     A1C  11/11/2021     MAMMO SCREENING  02/11/2022     LIPID  03/04/2022     MEDICARE ANNUAL WELLNESS VISIT  03/08/2022     DEXA  03/11/2024     COLORECTAL CANCER SCREENING  04/08/2024     ADVANCE CARE PLANNING  03/02/2025     DTAP/TDAP/TD IMMUNIZATION (4 - Td) 03/02/2030     HEPATITIS C SCREENING  Completed     MIGRAINE ACTION PLAN  Completed     PHQ-2  Completed     INFLUENZA VACCINE  Completed     Pneumococcal Vaccine: 65+ Years  Completed     ZOSTER IMMUNIZATION  Completed     Pneumococcal Vaccine: Pediatrics (0 to 5 Years) and At-Risk Patients (6 to 64 Years)  Aged Out     IPV IMMUNIZATION  Aged Out     MENINGITIS IMMUNIZATION  Aged Out     HEPATITIS B IMMUNIZATION  Aged Out     G 3 P 2   No LMP recorded. Patient has had a hysterectomy.     Fasting: No   Td: tdap 3/2020       NO - age 65 - see link Cervical Cytology Screening Guidelines  Status post benign hysterectomy. Health Maintenance and Surgical History updated.               Cholesterol:    Lab Results   Component Value Date    CHOL 248 2021     Lab Results   Component Value Date    HDL 83 2021     Lab Results   Component Value Date     2021     Lab Results   Component Value Date    TRIG 220 2021     Lab Results   Component Value Date    CHOLHDLRATIO 2.7 2015         MM/21  Dexa:  3/19     Flex/colo: 2020 q5yr      Seat Belt: Yes    Sunscreen use: Yes   Calcium Intake: adeq  Health Care Directive: No  Sexually Active: Yes     Current contraception: hysterectomy  History of abnormal Pap smear: Yes: see [facility]  Family history of colon/breast/ovarian cancer: Yes: see [facility]  Regular self breast exam: Yes  History of abnormal mammogram: Yes: see reports      Labs reviewed in EPIC  BP Readings from Last 3 Encounters:   21 128/76   20 (!) 163/73   20 128/70    Wt Readings from Last 3 Encounters:   21 83.7 kg (184 lb 9.6 oz)   20 82.2 kg (181 lb 3.2 oz)   20 81.6 kg (180 lb)                  Patient Active Problem List   Diagnosis     Hyperlipidemia LDL goal <130     Hypertension goal BP (blood pressure) < 150/90     Advanced directives, counseling/discussion     Osteopenia     GERD (gastroesophageal reflux disease)     Family history of breast cancer     Migraine without status migrainosus, not intractable, unspecified migraine type     Alopecia     Elevated fasting glucose     Vitamin D deficiency     Past Surgical History:   Procedure Laterality Date     BREAST BIOPSY, CORE RT/LT       COLONOSCOPY       ESOPHAGOSCOPY, GASTROSCOPY, DUODENOSCOPY (EGD), COMBINED  2012    Procedure: COMBINED ESOPHAGOSCOPY, GASTROSCOPY, DUODENOSCOPY (EGD), BIOPSY SINGLE OR MULTIPLE;  EGD-GERD;  Surgeon: Teo Alvarado MD;  Location: [facility] OR     HYSTERECTOMY, PAP NO LONGER INDICATED  2006    vaginal hyst with bilateral sal-ooph, HSIL     LIGATN/STRIP LONG OR SHORT SAPHEN      right leg     SURGICAL HISTORY OF -        conization for abnl PAP       Social History     Tobacco Use     Smoking status: Never Smoker     Smokeless tobacco: Never Used   Substance Use Topics     Alcohol use: No     Family History   Problem Relation Age of Onset     Heart Disease Mother         CHF     Hypertension Mother      Lipids Mother      Neurologic Disorder Mother 30        migraines     Alzheimer Disease Mother 67     Cancer Maternal Grandmother      Hypertension Brother      Hypertension Brother      Thyroid Disease Paternal Grandmother      Breast Cancer Paternal Grandmother      Cancer Father 76        Pancreatic ca age 76     Other Cancer Father         pancreatic     Breast Cancer Maternal Aunt          Current Outpatient Medications   Medication Sig Dispense Refill     amLODIPine (NORVASC) 10 MG tablet TAKE 1 TABLET EVERY DAY 90 tablet 1     aspirin 81 MG tablet Take 1 tablet by mouth daily.       Calcium Carbonate-Vitamin D (CALCIUM 500 + D PO) Take  by mouth. One tab three time a day  .        lisinopril (ZESTRIL) 10 MG tablet TAKE 1 TABLET EVERY DAY 90 tablet 1     MULTI-DAY OR daily       omeprazole (PRILOSEC) 20 MG DR capsule TAKE 1 CAPSULE EVERY DAY 90 capsule 3     pravastatin (PRAVACHOL) 40 MG tablet TAKE 1 TABLET EVERY DAY 90 tablet 3     sertraline (ZOLOFT) 100 MG tablet TAKE 1 AND 1/2 TABLETS BY MOUTH ONCE DAILY 135 tablet 1     SUMAtriptan (IMITREX) 50 MG tablet TAKE 1 TABLET  AT ONSET OF HEADACHE FOR MIGRAINE MAY REPEAT IN 2 HOURS. MAX 4 TABLETS/24 HOURS. 27 tablet 1     traZODone (DESYREL) 50 MG tablet Take 1 tablet (50 mg) by mouth At Bedtime for 5 days, THEN 2 tablets (100 mg) At Bedtime for 5 days, THEN 3 tablets (150 mg) At Bedtime for 5 days, THEN 4 tablets (200 mg) At Bedtime for 5 days. 50 tablet 0     VITAMIN D 1000 UNIT OR TABS 1 TABLET DAILY       Mammogram Screening: Recommended mammography every 1-2 years with patient discussion and risk factor consideration    Review of Systems   Constitutional: Negative for chills  "and fever.   HENT: Positive for hearing loss. Negative for congestion, ear pain and sore throat.    Eyes: Negative for pain and visual disturbance.   Respiratory: Negative for cough and shortness of breath.    Cardiovascular: Negative for chest pain, palpitations and peripheral edema.   Gastrointestinal: Positive for constipation. Negative for abdominal pain, diarrhea, heartburn, hematochezia and nausea.   Breasts:  Negative for tenderness, breast mass and discharge.   Genitourinary: Negative for dysuria, frequency, genital sores, hematuria, urgency, vaginal bleeding and vaginal discharge.   Musculoskeletal: Negative for arthralgias, joint swelling and myalgias.   Skin: Negative for rash.   Neurological: Positive for dizziness (positional, feels like falling over if she doesn't sit down.  happening daily) and headaches (daily non-migranous ha, possibly due to insomnia). Negative for weakness and paresthesias.   Psychiatric/Behavioral: Positive for sleep disturbance (long h/o insomnia, used ambien in past with good results, never tried trazodone.). Negative for mood changes. The patient is not nervous/anxious.          OBJECTIVE:   /76   Pulse 73   Temp 98.6  F (37  C) (Oral)   Resp 20   Ht 1.626 m (5' 4\")   Wt 83.7 kg (184 lb 9.6 oz)   BMI 31.69 kg/m   Estimated body mass index is 31.69 kg/m  as calculated from the following:    Height as of this encounter: 1.626 m (5' 4\").    Weight as of this encounter: 83.7 kg (184 lb 9.6 oz).  Physical Exam  GENERAL APPEARANCE: healthy, alert and no distress  EYES: Eyes grossly normal to inspection, PERRL and conjunctivae and sclerae normal  HENT: ear canals and TM's normal, nose and mouth without ulcers or lesions, oropharynx clear and oral mucous membranes moist  NECK: no adenopathy, no asymmetry, masses, or scars and thyroid normal to palpation  RESP: lungs clear to auscultation - no rales, rhonchi or wheezes  BREAST: normal without masses, tenderness or nipple " discharge and no palpable axillary masses or adenopathy, areas of hypopigmentation bilateral lower breasts and right areola,   CV: regular rate and rhythm, normal S1 S2, no S3 or S4, no murmur, click or rub, no peripheral edema and peripheral pulses strong  ABDOMEN: soft, nontender, no hepatosplenomegaly, no masses and bowel sounds normal  MS: no musculoskeletal defects are noted and gait is age appropriate without ataxia  SKIN: no suspicious lesions or rashes, small AK on right lower abdomen  NEURO: Normal strength and tone, sensory exam grossly normal, mentation intact and speech normal  PSYCH: mentation appears normal and affect normal/bright    Diagnostic Test Results:  Labs reviewed in Epic    ASSESSMENT / PLAN:   (Z00.00) Encounter for Medicare annual wellness exam  (primary encounter diagnosis)  Comment: preventive needs reviewed   Plan: see orders in Epic.     (G47.00) Insomnia, unspecified type  Comment: for many years  Plan: traZODone (DESYREL) 50 MG tablet, OFFICE/OUTPT         VISIT,EST,LEVL IV, CANCELED: OFFICE/OUTPT         VISIT,EST,LEVL III        Trial of trazodone, taper discussed    (I10) Hypertension goal BP (blood pressure) < 150/90  Comment: stable  Plan: **Basic metabolic panel FUTURE anytime,         OFFICE/OUTPT VISIT,EST,LEVL IV        Due for labs in 6 months    (R73.01) Elevated fasting glucose  Comment: on recent labs  Plan: **A1C FUTURE 6mo, OFFICE/OUTPT VISIT,EST,LEVL         IV        Check A1C with next labs    (R42) Vertigo  Comment: balance is off positionally  Plan: [facility] PT [facility] HAND REFERRAL, OFFICE/OUTPT         VISIT,EST,LEVL IV        Refer to PT    (R21) Rash  Comment: skin hypopigmentation bilateral breasts, no itch or redness.    Plan: DERMATOLOGY ADULT REFERRAL        Uncertain if possible vitiligo, no symptoms suggestive of tinea versicolor.        Patient has been advised of split billing requirements and indicates understanding: Yes  COUNSELING:  Reviewed preventive  "health counseling, as reflected in patient instructions  Special attention given to:       Regular exercise       Healthy diet/nutrition    Estimated body mass index is 31.69 kg/m  as calculated from the following:    Height as of this encounter: 1.626 m (5' 4\").    Weight as of this encounter: 83.7 kg (184 lb 9.6 oz).    Weight management plan: Discussed healthy diet and exercise guidelines    She reports that she has never smoked. She has never used smokeless tobacco.      Appropriate preventive services were discussed with this patient, including applicable screening as appropriate for cardiovascular disease, diabetes, osteopenia/osteoporosis, and glaucoma.  As appropriate for age/gender, discussed screening for colorectal cancer, prostate cancer, breast cancer, and cervical cancer. Checklist reviewing preventive services available has been given to the patient.    Reviewed patients plan of care and provided an AVS. The Intermediate Care Plan ( asthma action plan, low back pain action plan, and migraine action plan) for Nannette meets the Care Plan requirement. This Care Plan has been established and reviewed with the Patient.    Counseling Resources:  ATP IV Guidelines  Pooled Cohorts Equation Calculator  Breast Cancer Risk Calculator  Breast Cancer: Medication to Reduce Risk  FRAX Risk Assessment  ICSI Preventive Guidelines  Dietary Guidelines for Americans, 2010  Heartbeat's MyPlate  ASA Prophylaxis  Lung CA Screening    Joy Dickson MD  [facility]    Identified Health Risks:  "

## 2021-03-04 DIAGNOSIS — E78.5 HYPERLIPIDEMIA LDL GOAL <130: ICD-10-CM

## 2021-03-04 DIAGNOSIS — I10 HYPERTENSION GOAL BP (BLOOD PRESSURE) < 150/90: ICD-10-CM

## 2021-03-04 LAB
ANION GAP SERPL CALCULATED.3IONS-SCNC: 4 MMOL/L (ref 3–14)
BUN SERPL-MCNC: 17 MG/DL (ref 7–30)
CALCIUM SERPL-MCNC: 10.4 MG/DL (ref 8.5–10.1)
CHLORIDE SERPL-SCNC: 104 MMOL/L (ref 94–109)
CHOLEST SERPL-MCNC: 248 MG/DL
CO2 SERPL-SCNC: 28 MMOL/L (ref 20–32)
CREAT SERPL-MCNC: 0.82 MG/DL (ref 0.52–1.04)
GFR SERPL CREATININE-BSD FRML MDRD: 73 ML/MIN/{1.73_M2}
GLUCOSE SERPL-MCNC: 107 MG/DL (ref 70–99)
HDLC SERPL-MCNC: 83 MG/DL
LDLC SERPL CALC-MCNC: 121 MG/DL
NONHDLC SERPL-MCNC: 165 MG/DL
POTASSIUM SERPL-SCNC: 3.9 MMOL/L (ref 3.4–5.3)
SODIUM SERPL-SCNC: 136 MMOL/L (ref 133–144)
TRIGL SERPL-MCNC: 220 MG/DL

## 2021-03-04 PROCEDURE — 80061 LIPID PANEL: CPT | Performed by: FAMILY MEDICINE

## 2021-03-04 PROCEDURE — 36415 COLL VENOUS BLD VENIPUNCTURE: CPT | Performed by: FAMILY MEDICINE

## 2021-03-04 PROCEDURE — 80048 BASIC METABOLIC PNL TOTAL CA: CPT | Performed by: FAMILY MEDICINE

## 2021-03-05 NOTE — RESULT ENCOUNTER NOTE
Nannette,  Here are your lab results.  We will discuss these at your upcoming office or telephone visit.   See you soon.  Joy Dickson MD

## 2021-03-08 ENCOUNTER — OFFICE VISIT (OUTPATIENT)
Dept: FAMILY MEDICINE | Facility: CLINIC | Age: 66
End: 2021-03-08
Payer: COMMERCIAL

## 2021-03-08 VITALS
BODY MASS INDEX: 31.51 KG/M2 | HEART RATE: 73 BPM | TEMPERATURE: 98.6 F | WEIGHT: 184.6 LBS | HEIGHT: 64 IN | RESPIRATION RATE: 20 BRPM | DIASTOLIC BLOOD PRESSURE: 76 MMHG | SYSTOLIC BLOOD PRESSURE: 128 MMHG

## 2021-03-08 DIAGNOSIS — R73.01 ELEVATED FASTING GLUCOSE: ICD-10-CM

## 2021-03-08 DIAGNOSIS — G47.00 INSOMNIA, UNSPECIFIED TYPE: ICD-10-CM

## 2021-03-08 DIAGNOSIS — R42 VERTIGO: ICD-10-CM

## 2021-03-08 DIAGNOSIS — Z00.00 ENCOUNTER FOR MEDICARE ANNUAL WELLNESS EXAM: Primary | ICD-10-CM

## 2021-03-08 DIAGNOSIS — R21 RASH: ICD-10-CM

## 2021-03-08 DIAGNOSIS — I10 HYPERTENSION GOAL BP (BLOOD PRESSURE) < 150/90: ICD-10-CM

## 2021-03-08 PROCEDURE — 99397 PER PM REEVAL EST PAT 65+ YR: CPT | Performed by: FAMILY MEDICINE

## 2021-03-08 PROCEDURE — 99214 OFFICE O/P EST MOD 30 MIN: CPT | Mod: 25 | Performed by: FAMILY MEDICINE

## 2021-03-08 RX ORDER — TRAZODONE HYDROCHLORIDE 50 MG/1
TABLET, FILM COATED ORAL
Qty: 50 TABLET | Refills: 0 | Status: SHIPPED | OUTPATIENT
Start: 2021-03-08 | End: 2021-04-07

## 2021-03-08 ASSESSMENT — ENCOUNTER SYMPTOMS
ARTHRALGIAS: 0
DYSURIA: 0
SHORTNESS OF BREATH: 0
SLEEP DISTURBANCE: 1
HEADACHES: 1
PARESTHESIAS: 0
DIARRHEA: 0
WEAKNESS: 0
BREAST MASS: 0
NERVOUS/ANXIOUS: 0
CHILLS: 0
NAUSEA: 0
FEVER: 0
HEMATOCHEZIA: 0
HEMATURIA: 0
JOINT SWELLING: 0
FREQUENCY: 0
SORE THROAT: 0
CONSTIPATION: 1
HEARTBURN: 0
PALPITATIONS: 0
ABDOMINAL PAIN: 0
MYALGIAS: 0
COUGH: 0
DIZZINESS: 1
EYE PAIN: 0

## 2021-03-08 ASSESSMENT — MIFFLIN-ST. JEOR: SCORE: 1352.34

## 2021-03-08 ASSESSMENT — ACTIVITIES OF DAILY LIVING (ADL): CURRENT_FUNCTION: NO ASSISTANCE NEEDED

## 2021-04-06 ENCOUNTER — MYC MEDICAL ADVICE (OUTPATIENT)
Dept: FAMILY MEDICINE | Facility: CLINIC | Age: 66
End: 2021-04-06

## 2021-04-06 DIAGNOSIS — G47.00 INSOMNIA, UNSPECIFIED TYPE: ICD-10-CM

## 2021-04-07 ENCOUNTER — MYC MEDICAL ADVICE (OUTPATIENT)
Dept: FAMILY MEDICINE | Facility: CLINIC | Age: 66
End: 2021-04-07

## 2021-04-07 RX ORDER — ZOLPIDEM TARTRATE 5 MG/1
5 TABLET ORAL
Qty: 90 TABLET | Refills: 0 | Status: SHIPPED | OUTPATIENT
Start: 2021-04-07 | End: 2022-03-23

## 2021-07-23 ENCOUNTER — NURSE TRIAGE (OUTPATIENT)
Dept: NURSING | Facility: CLINIC | Age: 66
End: 2021-07-23

## 2021-07-23 NOTE — TELEPHONE ENCOUNTER
Pt's  is calling.    Nosebleed x 35 minutes out of her right nostril. She has pinched her nose the correct way for 15 minutes x 2 and bleeding continues. Not on blood thinners.  No history of nosebleeds.   I advised her that I would talk to the physician, as urgent care if not open. She may need to be seen in the ED unless the physician deems it ok to come into the office.  I advised her that we would call her back soon. Care advice reviewed. Continue to pinch nostrils leaning slightly forward. Do not be up moving around as she has been.      Reason for Disposition    Bleeding present > 30 minutes and using correct method of direct pressure    Additional Information    Negative: Fainted (passed out), or too weak to stand following large blood loss    Negative: Sounds like a life-threatening emergency to the triager    Negative: Nosebleed followed nose injury    Protocols used: NOSEBLEED-A-OH    Trena Burden RN  [facility] Nurse Advisor  7/23/2021 at 8:02 AM

## 2021-07-23 NOTE — TELEPHONE ENCOUNTER
Call to the clinic. No provider available to talk to at this time. Will call back patient and further advise.    Call back to the patient and her .    RN concerned about amount of bleeding and how long it has been. She has been pinching her nose x 20 minutes now. She let go and she is still bleeding.  Denies feeling dizzy or light headed.  I advised her  to take her to the ED now.  She verbalized understanding and will go now.    Trena Burden RN  [facility] Nurse Advisor  7/23/2021 at 8:30 AM

## 2021-07-31 ENCOUNTER — OFFICE VISIT (OUTPATIENT)
Dept: URGENT CARE | Facility: URGENT CARE | Age: 66
End: 2021-07-31
Payer: COMMERCIAL

## 2021-07-31 VITALS
DIASTOLIC BLOOD PRESSURE: 78 MMHG | HEART RATE: 73 BPM | OXYGEN SATURATION: 96 % | TEMPERATURE: 97.8 F | SYSTOLIC BLOOD PRESSURE: 154 MMHG

## 2021-07-31 DIAGNOSIS — R04.0 EPISTAXIS: Primary | ICD-10-CM

## 2021-07-31 DIAGNOSIS — I10 HYPERTENSION GOAL BP (BLOOD PRESSURE) < 150/90: ICD-10-CM

## 2021-07-31 LAB
BASOPHILS # BLD AUTO: 0 10E3/UL (ref 0–0.2)
BASOPHILS NFR BLD AUTO: 0 %
EOSINOPHIL # BLD AUTO: 0.2 10E3/UL (ref 0–0.7)
EOSINOPHIL NFR BLD AUTO: 3 %
ERYTHROCYTE [DISTWIDTH] IN BLOOD BY AUTOMATED COUNT: 13.7 % (ref 10–15)
HCT VFR BLD AUTO: 44.7 % (ref 35–47)
HGB BLD-MCNC: 14.7 G/DL (ref 11.7–15.7)
LYMPHOCYTES # BLD AUTO: 2 10E3/UL (ref 0.8–5.3)
LYMPHOCYTES NFR BLD AUTO: 23 %
MCH RBC QN AUTO: 28.7 PG (ref 26.5–33)
MCHC RBC AUTO-ENTMCNC: 32.9 G/DL (ref 31.5–36.5)
MCV RBC AUTO: 87 FL (ref 78–100)
MONOCYTES # BLD AUTO: 0.5 10E3/UL (ref 0–1.3)
MONOCYTES NFR BLD AUTO: 6 %
NEUTROPHILS # BLD AUTO: 5.7 10E3/UL (ref 1.6–8.3)
NEUTROPHILS NFR BLD AUTO: 67 %
PLATELET # BLD AUTO: 248 10E3/UL (ref 150–450)
RBC # BLD AUTO: 5.12 10E6/UL (ref 3.8–5.2)
WBC # BLD AUTO: 8.4 10E3/UL (ref 4–11)

## 2021-07-31 PROCEDURE — 36415 COLL VENOUS BLD VENIPUNCTURE: CPT | Performed by: INTERNAL MEDICINE

## 2021-07-31 PROCEDURE — 99214 OFFICE O/P EST MOD 30 MIN: CPT | Performed by: INTERNAL MEDICINE

## 2021-07-31 PROCEDURE — 85730 THROMBOPLASTIN TIME PARTIAL: CPT | Performed by: INTERNAL MEDICINE

## 2021-07-31 PROCEDURE — 85610 PROTHROMBIN TIME: CPT | Performed by: INTERNAL MEDICINE

## 2021-07-31 PROCEDURE — 85025 COMPLETE CBC W/AUTO DIFF WBC: CPT | Performed by: INTERNAL MEDICINE

## 2021-07-31 NOTE — PATIENT INSTRUCTIONS
Pat to follow up with Primary Care provider regarding elevated blood pressure. Recheck BP in 1-2 weeks with a nurse visit, [place] pharmacy visit, or a visit to primary care doctor.

## 2021-07-31 NOTE — PROGRESS NOTES
SUBJECTIVE:  Nannette Awad is an 66 year old female who presents for nose bleeds.  Had nose bleed on 7/23 in left nostril which lasted about 70 minutes then stopped.  Then again on 7/28 for about 25 minutes, also left nostril.  Then again today was right nostril first for about 35 min and then stopped and then about 10 min later the left nostril started bleeding for about 25 minutes and stopped on the way here.  Gets a headache during the nose bleeds.  No nasal injury or past hx of nose bleeds.  No other bleeding or bruising elsewhere.  Not on a blood thinner other than 81 mg aspirin daily which has taken for many years. No recent medication changes.  No personal or family hx of bleeding disorders.  When has nose bleed will apply pressure and lean forward as has been instructed in past. No lightheadedness, dizziness, shortness of breath, cp or fatigue.  PMH:   has a past medical history of Bursitis of knee (2012), Carpal tunnel syndrome (1970), Elevated fasting glucose, Family history of breast cancer, GERD (gastroesophageal reflux disease), HSIL (high grade squamous intraepithelial lesion) on Pap smear of cervix (2006), HTN (hypertension), Hyperlipidemia LDL goal < 130 (2002), Insomnia (1995), Left sided sciatica (2012), Migraine, Mitral (valve) prolapse, Obesity (4/16/2014), Osteopenia (2000), Psoriasis, Raynaud's disease, Tubular adenoma, and Vitamin D deficiency (2008).  Patient Active Problem List   Diagnosis     Hyperlipidemia LDL goal <130     Hypertension goal BP (blood pressure) < 150/90     Advanced directives, counseling/discussion     Osteopenia     GERD (gastroesophageal reflux disease)     Family history of breast cancer     Migraine without status migrainosus, not intractable, unspecified migraine type     Alopecia     Elevated fasting glucose     Vitamin D deficiency     Social History     Socioeconomic History     Marital status:      Spouse name: Roland     Number of children: 2     Years  of education: 16     Highest education level: None   Occupational History     Occupation:      Employer: [employer]   Tobacco Use     Smoking status: Never Smoker     Smokeless tobacco: Never Used   Substance and Sexual Activity     Alcohol use: No     Drug use: No     Sexual activity: Yes     Partners: Male     Birth control/protection: Female Surgical   Other Topics Concern     Parent/sibling w/ CABG, MI or angioplasty before 65F 55M? No   Social History Narrative     None     Social Determinants of Health     Financial Resource Strain:      Difficulty of Paying Living Expenses:    Food Insecurity:      Worried About Running Out of Food in the Last Year:      Ran Out of Food in the Last Year:    Transportation Needs:      Lack of Transportation (Medical):      Lack of Transportation (Non-Medical):    Physical Activity:      Days of Exercise per Week:      Minutes of Exercise per Session:    Stress:      Feeling of Stress :    Social Connections:      Frequency of Communication with Friends and Family:      Frequency of Social Gatherings with Friends and Family:      Attends Spiritism Services:      Active Member of Clubs or Organizations:      Attends Club or Organization Meetings:      Marital Status:    Intimate Partner Violence:      Fear of Current or Ex-Partner:      Emotionally Abused:      Physically Abused:      Sexually Abused:      Family History   Problem Relation Age of Onset     Heart Disease Mother         CHF     Hypertension Mother      Lipids Mother      Neurologic Disorder Mother 30        migraines     Alzheimer Disease Mother 67     Cancer Maternal Grandmother      Hypertension Brother      Hypertension Brother      Thyroid Disease Paternal Grandmother      Breast Cancer Paternal Grandmother      Cancer Father 76        Pancreatic ca age 76     Other Cancer Father         pancreatic     Breast Cancer Maternal Aunt        ALLERGIES:  Lipitor [atorvastatin calcium] and Sulfa  drugs    Current Outpatient Medications   Medication     amLODIPine (NORVASC) 10 MG tablet     aspirin 81 MG tablet     Calcium Carbonate-Vitamin D (CALCIUM 500 + D PO)     lisinopril (ZESTRIL) 10 MG tablet     MULTI-DAY OR     omeprazole (PRILOSEC) 20 MG DR capsule     pravastatin (PRAVACHOL) 40 MG tablet     sertraline (ZOLOFT) 100 MG tablet     SUMAtriptan (IMITREX) 50 MG tablet     VITAMIN D 1000 UNIT OR TABS     zolpidem (AMBIEN) 5 MG tablet     No current facility-administered medications for this visit.         ROS:  ROS is done and is negative for general/constitutional, eye, ENT, Respiratory, cardiovascular, GI, , Skin, musculoskeletal except as noted elsewhere.  All other review of systems negative except as noted elsewhere.      OBJECTIVE:  BP (!) 154/78   Pulse 73   Temp 97.8  F (36.6  C) (Tympanic)   SpO2 96%   GENERAL APPEARANCE: Alert, in no acute distress  EYES: normal  EARS: External ears normal. Canals clear. TM's normal.  NOSE:normal  OROPHARYNX: no active bleeding noted.  Small amount of dried blood in left nostril  NECK:No adenopathy,masses or thyromegaly  RESP: normal and clear to auscultation  CV:regular rate and rhythm and no murmurs, clicks, or gallops  ABDOMEN: Abdomen soft, non-tender. BS normal. No masses, organomegaly  SKIN: no ulcers, lesions or rash.  No bruising or petechiae.  MUSCULOSKELETAL:Musculoskeletal normal      RESULTS  No results found for any visits on 07/31/21..  No results found for this or any previous visit (from the past 48 hour(s)).    ASSESSMENT/PLAN:    ASSESSMENT / PLAN:  (R04.0) Epistaxis  (primary encounter diagnosis)  Comment: recurring over past weeks.    Plan: CBC with platelets and differential, Partial         thromboplastin time, INR, Otolaryngology         Referral        Check labs.  Refer to ENT for additional evaluation.  Reviewed management and pressure application for nose bleeds.  If bleeding profusely or if not stop within 60 minutes is to go  to the ER immediately.      (I10) Hypertension goal BP (blood pressure) < 150/90  Comment: bp a little above goal set by pcp.  May be elevated due to concern about her condition or to discomfort.  BP at current reading not likely a significant factor in her epistaxis.  Plan: Recheck BP in 1-2 weeks with a nurse visit, [place] pharmacy visit, or a visit to primary care doctor.      PPE worn: mask and shield.    See [facility] for orders, medications, letters, patient instructions    Bryanna Magana M.D.

## 2021-08-02 LAB
APTT PPP: 35 SECONDS (ref 22–38)
INR PPP: 1.04 (ref 0.85–1.15)

## 2021-08-03 ENCOUNTER — OFFICE VISIT (OUTPATIENT)
Dept: URGENT CARE | Facility: URGENT CARE | Age: 66
End: 2021-08-03
Payer: COMMERCIAL

## 2021-08-03 VITALS
SYSTOLIC BLOOD PRESSURE: 145 MMHG | DIASTOLIC BLOOD PRESSURE: 74 MMHG | BODY MASS INDEX: 31.79 KG/M2 | WEIGHT: 185.2 LBS | TEMPERATURE: 97.9 F | OXYGEN SATURATION: 98 % | HEART RATE: 67 BPM

## 2021-08-03 DIAGNOSIS — R04.0 EPISTAXIS: Primary | ICD-10-CM

## 2021-08-03 PROCEDURE — 99213 OFFICE O/P EST LOW 20 MIN: CPT | Performed by: INTERNAL MEDICINE

## 2021-08-03 NOTE — PROGRESS NOTES
Assessment & Plan     There are no diagnoses linked to this encounter.   {2021 E&M time (Optional):664180}    {Provider  Link to [facility] Help Grid :383818}    No follow-ups on file.    Sofia Bran NP  [facility] [place]    Subjective     Corrina is a 66 year old female who presents to clinic today for the following health issues:  Chief Complaint   Patient presents with     Nose Bleeds     Nose bleeds since july 23rd. Today started at 1 pm today didn't stop until around 2:30. Said this is the 6th nose bleed since the 23rd of July. Was here saturday then went to [facility] on sunday was told to come in and get it packed if it continues.        {UC Conditions (Optional):077760}    Problem list, Medication list, Allergies, and Medical history reviewed in EPIC.    ROS:  Review of systems negative except for noted above        Objective    There were no vitals taken for this visit.  Physical Exam   {Exam List (Optional):643736}    {Diagnostic Test Results (Optional):575932}

## 2021-08-03 NOTE — PATIENT INSTRUCTIONS
If your nose bleeds again and doesn't stop within 30 minutes of applying continuous pressure, you should go immediately to the emergency room.

## 2021-08-03 NOTE — PROGRESS NOTES
SUBJECTIVE:  Nannette Awad is an 66 year old female who presents for nose bleeds.  Bleeding started about 1:00 and stopped around 2:30.  Was seen on 7/31 for nose bleed.  Then on 8/1 went to ER for nose bleed and was given a nasal spray to use but didn't have packing done; bled for about 45 minutes.  No nose bleed yesterday.  Today started bleeding around 1:00pm from right nostril.  Blood is normal red color of blood and sometimes has clots that come out of nose.  Went to ER as told to go to ER if bled again, but left because wait was long. Came here to urgent care.  Gets headache sometimes with nose bleeds and tylenol not help much.  When gets bleeding sits up, leans forward and applies pressure to nose with either fingers or the nose clamp they gave her.      PMH:   has a past medical history of Bursitis of knee (2012), Carpal tunnel syndrome (1970), Elevated fasting glucose, Family history of breast cancer, GERD (gastroesophageal reflux disease), HSIL (high grade squamous intraepithelial lesion) on Pap smear of cervix (2006), HTN (hypertension), Hyperlipidemia LDL goal < 130 (2002), Insomnia (1995), Left sided sciatica (2012), Migraine, Mitral (valve) prolapse, Obesity (4/16/2014), Osteopenia (2000), Psoriasis, Raynaud's disease, Tubular adenoma, and Vitamin D deficiency (2008).  Patient Active Problem List   Diagnosis     Hyperlipidemia LDL goal <130     Hypertension goal BP (blood pressure) < 150/90     Advanced directives, counseling/discussion     Osteopenia     GERD (gastroesophageal reflux disease)     Family history of breast cancer     Migraine without status migrainosus, not intractable, unspecified migraine type     Alopecia     Elevated fasting glucose     Vitamin D deficiency     Social History     Socioeconomic History     Marital status:      Spouse name: Roland     Number of children: 2     Years of education: 16     Highest education level: None   Occupational History     Occupation:       Employer: [employer]   Tobacco Use     Smoking status: Never Smoker     Smokeless tobacco: Never Used   Substance and Sexual Activity     Alcohol use: No     Drug use: No     Sexual activity: Yes     Partners: Male     Birth control/protection: Female Surgical   Other Topics Concern     Parent/sibling w/ CABG, MI or angioplasty before 65F 55M? No   Social History Narrative     None     Social Determinants of Health     Financial Resource Strain:      Difficulty of Paying Living Expenses:    Food Insecurity:      Worried About Running Out of Food in the Last Year:      Ran Out of Food in the Last Year:    Transportation Needs:      Lack of Transportation (Medical):      Lack of Transportation (Non-Medical):    Physical Activity:      Days of Exercise per Week:      Minutes of Exercise per Session:    Stress:      Feeling of Stress :    Social Connections:      Frequency of Communication with Friends and Family:      Frequency of Social Gatherings with Friends and Family:      Attends [employer] Services:      Active Member of Clubs or Organizations:      Attends Club or Organization Meetings:      Marital Status:    Intimate Partner Violence:      Fear of Current or Ex-Partner:      Emotionally Abused:      Physically Abused:      Sexually Abused:      Family History   Problem Relation Age of Onset     Heart Disease Mother         CHF     Hypertension Mother      Lipids Mother      Neurologic Disorder Mother 30        migraines     Alzheimer Disease Mother 67     Cancer Maternal Grandmother      Hypertension Brother      Hypertension Brother      Thyroid Disease Paternal Grandmother      Breast Cancer Paternal Grandmother      Cancer Father 76        Pancreatic ca age 76     Other Cancer Father         pancreatic     Breast Cancer Maternal Aunt        ALLERGIES:  Lipitor [atorvastatin calcium] and Sulfa drugs    Current Outpatient Medications   Medication     amLODIPine (NORVASC) 10 MG tablet      aspirin 81 MG tablet     Calcium Carbonate-Vitamin D (CALCIUM 500 + D PO)     lisinopril (ZESTRIL) 10 MG tablet     MULTI-DAY OR     omeprazole (PRILOSEC) 20 MG DR capsule     pravastatin (PRAVACHOL) 40 MG tablet     sertraline (ZOLOFT) 100 MG tablet     VITAMIN D 1000 UNIT OR TABS     SUMAtriptan (IMITREX) 50 MG tablet     zolpidem (AMBIEN) 5 MG tablet     No current facility-administered medications for this visit.         ROS:  ROS is done and is negative for general/constitutional, eye, ENT, Respiratory, cardiovascular, GI, , Skin, musculoskeletal except as noted elsewhere.  All other review of systems negative except as noted elsewhere.      OBJECTIVE:  BP (!) 145/74   Pulse 67   Temp 97.9  F (36.6  C) (Oral)   Wt 84 kg (185 lb 3.2 oz)   SpO2 98%   BMI 31.79 kg/m    GENERAL APPEARANCE: Alert, in no acute distress  EYES: normal  NOSE:small amount of freshly dried blood in right nostril; no masses, polyps, or vessels or active bleeding currently visualized  OROPHARYNX:normal  NECK:No adenopathy,masses or thyromegaly  RESP: normal and clear to auscultation  CV:regular rate and rhythm and no murmurs, clicks, or gallops  ABDOMEN: Abdomen soft, non-tender. BS normal. No masses, organomegaly  SKIN: no ulcers, lesions or rash  MUSCULOSKELETAL:Musculoskeletal normal      RESULTS  Reviewed recent cbc, inr, and ptt results    ASSESSMENT/PLAN:    ASSESSMENT / PLAN:  (R04.0) Epistaxis  (primary encounter diagnosis)  Comment: her current bleed has stopped.  Recent labs were normal.  Plan: pt advised to go to ER if bleeding recurs and does not stop within 30 minutes of continuous pressure application.  Keep scheduled appt in 3 days with ENT.      PPE worn: mask and shield.    See [facility] for orders, medications, letters, patient instructions    Bryanna Magana M.D.

## 2021-08-06 ENCOUNTER — OFFICE VISIT (OUTPATIENT)
Dept: OTOLARYNGOLOGY | Facility: CLINIC | Age: 66
End: 2021-08-06
Payer: COMMERCIAL

## 2021-08-06 VITALS — DIASTOLIC BLOOD PRESSURE: 75 MMHG | HEART RATE: 84 BPM | OXYGEN SATURATION: 98 % | SYSTOLIC BLOOD PRESSURE: 147 MMHG

## 2021-08-06 DIAGNOSIS — R04.0 EPISTAXIS: Primary | ICD-10-CM

## 2021-08-06 PROCEDURE — 99203 OFFICE O/P NEW LOW 30 MIN: CPT | Mod: 25 | Performed by: OTOLARYNGOLOGY

## 2021-08-06 PROCEDURE — 30901 CONTROL OF NOSEBLEED: CPT | Performed by: OTOLARYNGOLOGY

## 2021-08-06 ASSESSMENT — ENCOUNTER SYMPTOMS
HEMOPTYSIS: 0
SORE THROAT: 0
SPUTUM PRODUCTION: 0
HEADACHES: 0
PHOTOPHOBIA: 0
TREMORS: 0
STRIDOR: 0
NAUSEA: 0
HEARTBURN: 0
COUGH: 0
BLURRED VISION: 0
DOUBLE VISION: 0
SINUS PAIN: 0
TINGLING: 0
VOMITING: 0
DIZZINESS: 0
BRUISES/BLEEDS EASILY: 0
CONSTITUTIONAL NEGATIVE: 1

## 2021-08-06 NOTE — NURSING NOTE
Nannette Awad's goals for this visit include:   Chief Complaint   Patient presents with     Consult     Epitaxis, on and off since 7-23-21, Has been in ER 8-1-21 and given Generic Afrin nasal spray        She requests these members of her care team be copied on today's visit information: yes    PCP: Joy Dickson    Referring Provider:  No referring provider defined for this encounter.    BP (!) 147/75   Pulse 84   SpO2 98%     Do you need any medication refills at today's visit? no

## 2021-08-06 NOTE — PROGRESS NOTES
HPI    This is a 66 year old patient who has been having nose bleeds for the past several weeks. Occurred 7-8 times since it started . She has seasonal environmental allergies. Denies any trauma, bleeding problems, easy bruising, facial pressure or surgery.  Her BP is under control with medications.    ENT Problem List  GERD (gastroesophageal reflux disease)  Migraine without status migrainosus, not intractable, unspecified migraine type  Hyperlipidemia LDL goal <130  Hypertension goal BP (blood pressure) < 150/90  Osteopenia  Family history of breast cancer  Alopecia  Elevated fasting glucose  Vitamin D deficiency    Medications       amLODIPine (NORVASC) 10 MG tablet  aspirin 81 MG tablet  lisinopril (ZESTRIL) 10 MG tablet  MULTI-DAY OR  omeprazole (PRILOSEC) 20 MG DR capsule  pravastatin (PRAVACHOL) 40 MG tablet  sertraline (ZOLOFT) 100 MG tablet  SUMAtriptan (IMITREX) 50 MG tablet  VITAMIN D 1000 UNIT OR TABS  zolpidem (AMBIEN) 5 MG tablet  Calcium Carbonate-Vitamin D (CALCIUM 500 + D PO)        Review of Systems   Constitutional: Negative.    HENT: Positive for congestion and nosebleeds. Negative for ear discharge, ear pain, sinus pain, sore throat and tinnitus.    Eyes: Negative for blurred vision, double vision and photophobia.   Respiratory: Negative for cough, hemoptysis, sputum production and stridor.    Gastrointestinal: Negative for heartburn, nausea and vomiting.   Skin: Negative.    Neurological: Negative for dizziness, tingling, tremors and headaches.   Endo/Heme/Allergies: Positive for environmental allergies. Does not bruise/bleed easily.         Physical Exam  Vitals reviewed.   Constitutional:       Appearance: Normal appearance.   HENT:      Head: Normocephalic and atraumatic.      Right Ear: Tympanic membrane, ear canal and external ear normal. No decreased hearing noted. No middle ear effusion. There is no impacted cerumen.      Left Ear: Tympanic membrane, ear canal and external ear normal. No  decreased hearing noted.  No middle ear effusion. There is no impacted cerumen.      Nose: Congestion and rhinorrhea present. No mucosal edema.      Right Nostril: Epistaxis present.      Left Nostril: No epistaxis.      Right Turbinates: Not enlarged or swollen.      Left Turbinates: Not enlarged or swollen.      Mouth/Throat:      Mouth: Mucous membranes are moist.      Pharynx: Oropharynx is clear. Uvula midline.   Eyes:      Extraocular Movements: Extraocular movements intact.      Pupils: Pupils are equal, round, and reactive to light.   Neurological:      Mental Status: She is alert.       Anterior nasal cauterization: Numbed with 4% Lidocaine with oxymethasoline for 5 minutes. Right anterior Kiesselbach area, an inferiorly located vessel was Cauterized with silver nitrate.    A/P  Anterior nasal cauterization: Numbed with 4% Lidocaine with oxymethasoline for 5 minutes. Right anterior Kiesselbach area, an inferiorly located vessel was Cauterized with silver nitrate. F/U in 10 days to check anterior septation and cauterize the left region.

## 2021-08-06 NOTE — LETTER
8/6/2021         RE: Nannette Awad  [address]  [facility] [address]        Dear Colleague,    Thank you for referring your patient, Nannette Awad, to the [facility]. Please see a copy of my visit note below.    HPI    This is a 66 year old patient who has been having nose bleeds for the past several weeks. Occurred 7-8 times since it started . She has seasonal environmental allergies. Denies any trauma, bleeding problems, easy bruising, facial pressure or surgery.  Her BP is under control with medications.    ENT Problem List  GERD (gastroesophageal reflux disease)  Migraine without status migrainosus, not intractable, unspecified migraine type  Hyperlipidemia LDL goal <130  Hypertension goal BP (blood pressure) < 150/90  Osteopenia  Family history of breast cancer  Alopecia  Elevated fasting glucose  Vitamin D deficiency    Medications       amLODIPine (NORVASC) 10 MG tablet  aspirin 81 MG tablet  lisinopril (ZESTRIL) 10 MG tablet  MULTI-DAY OR  omeprazole (PRILOSEC) 20 MG DR capsule  pravastatin (PRAVACHOL) 40 MG tablet  sertraline (ZOLOFT) 100 MG tablet  SUMAtriptan (IMITREX) 50 MG tablet  VITAMIN D 1000 UNIT OR TABS  zolpidem (AMBIEN) 5 MG tablet  Calcium Carbonate-Vitamin D (CALCIUM 500 + D PO)        Review of Systems   Constitutional: Negative.    HENT: Positive for congestion and nosebleeds. Negative for ear discharge, ear pain, sinus pain, sore throat and tinnitus.    Eyes: Negative for blurred vision, double vision and photophobia.   Respiratory: Negative for cough, hemoptysis, sputum production and stridor.    Gastrointestinal: Negative for heartburn, nausea and vomiting.   Skin: Negative.    Neurological: Negative for dizziness, tingling, tremors and headaches.   Endo/Heme/Allergies: Positive for environmental allergies. Does not bruise/bleed easily.         Physical Exam  Vitals reviewed.   Constitutional:       Appearance: Normal appearance.   HENT:      Head:  Normocephalic and atraumatic.      Right Ear: Tympanic membrane, ear canal and external ear normal. No decreased hearing noted. No middle ear effusion. There is no impacted cerumen.      Left Ear: Tympanic membrane, ear canal and external ear normal. No decreased hearing noted.  No middle ear effusion. There is no impacted cerumen.      Nose: Congestion and rhinorrhea present. No mucosal edema.      Right Nostril: Epistaxis present.      Left Nostril: No epistaxis.      Right Turbinates: Not enlarged or swollen.      Left Turbinates: Not enlarged or swollen.      Mouth/Throat:      Mouth: Mucous membranes are moist.      Pharynx: Oropharynx is clear. Uvula midline.   Eyes:      Extraocular Movements: Extraocular movements intact.      Pupils: Pupils are equal, round, and reactive to light.   Neurological:      Mental Status: She is alert.       Anterior nasal cauterization: Numbed with 4% Lidocaine with oxymethasoline for 5 minutes. Right anterior Kiesselbach area, an inferiorly located vessel was Cauterized with silver nitrate.    A/P  Anterior nasal cauterization: Numbed with 4% Lidocaine with oxymethasoline for 5 minutes. Right anterior Kiesselbach area, an inferiorly located vessel was Cauterized with silver nitrate. F/U in 10 days to check anterior septation and cauterize the left region.          Again, thank you for allowing me to participate in the care of your patient.        Sincerely,        Mirian Carrero MD

## 2021-08-10 ENCOUNTER — TELEPHONE (OUTPATIENT)
Dept: FAMILY MEDICINE | Facility: CLINIC | Age: 66
End: 2021-08-10

## 2021-08-10 ENCOUNTER — OFFICE VISIT (OUTPATIENT)
Dept: OTOLARYNGOLOGY | Facility: CLINIC | Age: 66
End: 2021-08-10
Payer: COMMERCIAL

## 2021-08-10 VITALS — SYSTOLIC BLOOD PRESSURE: 128 MMHG | DIASTOLIC BLOOD PRESSURE: 76 MMHG | HEART RATE: 59 BPM

## 2021-08-10 DIAGNOSIS — R04.0 EPISTAXIS: Primary | ICD-10-CM

## 2021-08-10 PROCEDURE — 30901 CONTROL OF NOSEBLEED: CPT | Performed by: OTOLARYNGOLOGY

## 2021-08-10 PROCEDURE — 99213 OFFICE O/P EST LOW 20 MIN: CPT | Mod: 25 | Performed by: OTOLARYNGOLOGY

## 2021-08-10 ASSESSMENT — PAIN SCALES - GENERAL: PAINLEVEL: NO PAIN (0)

## 2021-08-10 NOTE — LETTER
8/10/2021         RE: Nannette Awad  [address]  [facility] [address]        Dear Colleague,    Thank you for referring your patient, Nannette Awad, to the [facility]. Please see a copy of my visit note below.    HPI    This is a 66 year old patient who has been having nose bleeds for the past several weeks. Occurred 7-8 times since it started . She has seasonal environmental allergies. Denies any trauma, bleeding problems, easy bruising, facial pressure or surgery.  Her BP is under control with medications.    ENT Problem List  GERD (gastroesophageal reflux disease)  Migraine without status migrainosus, not intractable, unspecified migraine type  Hyperlipidemia LDL goal <130  Hypertension goal BP (blood pressure) < 150/90  Osteopenia  Family history of breast cancer  Alopecia  Elevated fasting glucose  Vitamin D deficiency    Medications       amLODIPine (NORVASC) 10 MG tablet  aspirin 81 MG tablet  lisinopril (ZESTRIL) 10 MG tablet  MULTI-DAY OR  omeprazole (PRILOSEC) 20 MG DR capsule  pravastatin (PRAVACHOL) 40 MG tablet  sertraline (ZOLOFT) 100 MG tablet  SUMAtriptan (IMITREX) 50 MG tablet  VITAMIN D 1000 UNIT OR TABS  zolpidem (AMBIEN) 5 MG tablet  Calcium Carbonate-Vitamin D (CALCIUM 500 + D PO)        Review of Systems   Constitutional: Negative.    HENT: Positive for congestion and nosebleeds. Negative for ear discharge, ear pain, sinus pain, sore throat and tinnitus.    Eyes: Negative for blurred vision, double vision and photophobia.   Respiratory: Negative for cough, hemoptysis, sputum production and stridor.    Gastrointestinal: Negative for heartburn, nausea and vomiting.   Skin: Negative.    Neurological: Negative for dizziness, tingling, tremors and headaches.   Endo/Heme/Allergies: Positive for environmental allergies. Does not bruise/bleed easily.         Physical Exam  Vitals reviewed.   Constitutional:       Appearance: Normal appearance.   HENT:      Head:  Normocephalic and atraumatic.      Right Ear: Tympanic membrane, ear canal and external ear normal. No decreased hearing noted. No middle ear effusion. There is no impacted cerumen.      Left Ear: Tympanic membrane, ear canal and external ear normal. No decreased hearing noted.  No middle ear effusion. There is no impacted cerumen.      Nose: Congestion and rhinorrhea present. No mucosal edema.      Right Nostril: Epistaxis present.      Left Nostril: No epistaxis.      Right Turbinates: Not enlarged or swollen.      Left Turbinates: Not enlarged or swollen.      Mouth/Throat:      Mouth: Mucous membranes are moist.      Pharynx: Oropharynx is clear. Uvula midline.   Eyes:      Extraocular Movements: Extraocular movements intact.      Pupils: Pupils are equal, round, and reactive to light.   Neurological:      Mental Status: She is alert.       Anterior nasal cauterization: Numbed with 4% Lidocaine with oxymethasoline for 5 minutes. Right anterior Kiesselbach area, an inferiorly located vessel was Cauterized with silver nitrate.    A/P  Anterior nasal cauterization: Numbed with 4% Lidocaine with oxymethasoline for 5 minutes. Right anterior Kiesselbach area, an inferiorly located vessel was Cauterized with silver nitrate. F/U in 10 days to check anterior septation and cauterize the left region.          Again, thank you for allowing me to participate in the care of your patient.        Sincerely,        Mirian Carrero MD

## 2021-08-10 NOTE — TELEPHONE ENCOUNTER
Patient Quality Outreach      Summary:    Patient has the following on her problem list/HM:   IVD     ASA: Passed    Last LDL:    Lab Results   Component Value Date    CHOL 248 03/04/2021     Lab Results   Component Value Date    HDL 83 03/04/2021     Lab Results   Component Value Date     03/04/2021     Lab Results   Component Value Date    TRIG 220 03/04/2021        Lab Results   Component Value Date    CHOLHDLRATIO 2.7 02/02/2015        Is the patient on a Statin? Yes   Is the patient on Aspirin? Yes                  Medications     HMG CoA Reductase Inhibitors     pravastatin (PRAVACHOL) 40 MG tablet       Salicylates     aspirin 81 MG tablet             Last three blood pressure readings:  BP Readings from Last 3 Encounters:   08/06/21 (!) 147/75   08/03/21 (!) 145/74   07/31/21 (!) 154/78        Tobacco History:       Tobacco Use      Smoking status: Never Smoker      Smokeless tobacco: Never Used      Hypertension   Last three blood pressure readings:  BP Readings from Last 3 Encounters:   08/06/21 (!) 147/75   08/03/21 (!) 145/74   07/31/21 (!) 154/78     Blood pressure: Failed    HTN Guidelines:  ? 139/89     Patient is due/failing the following:   BP check and non fasting labs due in September     Type of outreach:    Sent [employer] message.    Questions for provider review:    None                                                                                                                                     Carlos Brooks CMA       Chart routed to closed.

## 2021-08-10 NOTE — NURSING NOTE
Nannette Awad's goals for this visit include:   Chief Complaint   Patient presents with     Epistaxis     nosebleed yesterday x 40 minutes, last cauterized on Friday       She requests these members of her care team be copied on today's visit information:     PCP: Joy Dickson    Referring Provider:  No referring provider defined for this encounter.    There were no vitals taken for this visit.    Do you need any medication refills at today's visit? No    Socorro Dunne RN

## 2021-09-15 ENCOUNTER — LAB (OUTPATIENT)
Dept: LAB | Facility: CLINIC | Age: 66
End: 2021-09-15
Payer: COMMERCIAL

## 2021-09-15 ENCOUNTER — ALLIED HEALTH/NURSE VISIT (OUTPATIENT)
Dept: FAMILY MEDICINE | Facility: CLINIC | Age: 66
End: 2021-09-15

## 2021-09-15 VITALS — HEART RATE: 80 BPM | SYSTOLIC BLOOD PRESSURE: 136 MMHG | DIASTOLIC BLOOD PRESSURE: 70 MMHG

## 2021-09-15 DIAGNOSIS — Z01.30 BLOOD PRESSURE CHECK: Primary | ICD-10-CM

## 2021-09-15 DIAGNOSIS — I10 HYPERTENSION GOAL BP (BLOOD PRESSURE) < 150/90: ICD-10-CM

## 2021-09-15 DIAGNOSIS — R73.01 ELEVATED FASTING GLUCOSE: ICD-10-CM

## 2021-09-15 LAB
ANION GAP SERPL CALCULATED.3IONS-SCNC: 7 MMOL/L (ref 3–14)
BUN SERPL-MCNC: 24 MG/DL (ref 7–30)
CALCIUM SERPL-MCNC: 9.9 MG/DL (ref 8.5–10.1)
CHLORIDE BLD-SCNC: 107 MMOL/L (ref 94–109)
CO2 SERPL-SCNC: 23 MMOL/L (ref 20–32)
CREAT SERPL-MCNC: 0.9 MG/DL (ref 0.52–1.04)
GFR SERPL CREATININE-BSD FRML MDRD: 66 ML/MIN/1.73M2
GLUCOSE BLD-MCNC: 123 MG/DL (ref 70–99)
HBA1C MFR BLD: 6.2 % (ref 0–5.6)
POTASSIUM BLD-SCNC: 4.5 MMOL/L (ref 3.4–5.3)
SODIUM SERPL-SCNC: 137 MMOL/L (ref 133–144)

## 2021-09-15 PROCEDURE — 80048 BASIC METABOLIC PNL TOTAL CA: CPT

## 2021-09-15 PROCEDURE — 83036 HEMOGLOBIN GLYCOSYLATED A1C: CPT

## 2021-09-15 PROCEDURE — 36415 COLL VENOUS BLD VENIPUNCTURE: CPT

## 2021-09-15 PROCEDURE — 99207 PR NO CHARGE NURSE ONLY: CPT | Performed by: FAMILY MEDICINE

## 2021-09-15 NOTE — PROGRESS NOTES
Nannette Awad was evaluated at [facility] on September 15, 2021 at which time her blood pressure was:    BP Readings from Last 3 Encounters:   09/15/21 136/70   08/10/21 128/76   08/06/21 (!) 147/75     Pulse Readings from Last 3 Encounters:   09/15/21 80   08/10/21 59   08/06/21 84       Reviewed lifestyle modifications for blood pressure control and reduction: including making healthy food choices, managing weight, getting regular exercise, smoking cessation, reducing alcohol consumption, monitoring blood pressure regularly.     Symptoms: None    BP Goal:< 140/90 mmHg    BP Assessment:  BP at goal    Potential Reasons for BP too high: NA - Not applicable    BP Follow-Up Plan: Recheck BP in 6 months at pharmacy    Recommendation to Provider: None    Note completed by: Tanvir Bentley RPh.  [facility]  [phone]

## 2021-12-14 NOTE — TELEPHONE ENCOUNTER
Prescription approved per [facility] Refill Protocol.  BP Readings from Last 3 Encounters:   05/06/17 137/57   03/23/17 136/78   02/20/17 140/65       
leg

## 2022-02-15 ENCOUNTER — TRANSFERRED RECORDS (OUTPATIENT)
Dept: HEALTH INFORMATION MANAGEMENT | Facility: CLINIC | Age: 67
End: 2022-02-15
Payer: COMMERCIAL

## 2022-03-08 ENCOUNTER — ALLIED HEALTH/NURSE VISIT (OUTPATIENT)
Dept: FAMILY MEDICINE | Facility: CLINIC | Age: 67
End: 2022-03-08
Payer: COMMERCIAL

## 2022-03-08 VITALS — HEART RATE: 70 BPM | SYSTOLIC BLOOD PRESSURE: 148 MMHG | DIASTOLIC BLOOD PRESSURE: 68 MMHG

## 2022-03-08 DIAGNOSIS — Z01.30 BLOOD PRESSURE CHECK: Primary | ICD-10-CM

## 2022-03-08 PROCEDURE — 99207 PR NO CHARGE NURSE ONLY: CPT | Performed by: FAMILY MEDICINE

## 2022-03-08 NOTE — PROGRESS NOTES
Nannette Awad was evaluated at [facility] on March 8, 2022 at which time her blood pressure was:    BP Readings from Last 3 Encounters:   03/08/22 (!) 148/68   09/15/21 136/70   08/10/21 128/76     Pulse Readings from Last 3 Encounters:   03/08/22 70   09/15/21 80   08/10/21 59       Reviewed lifestyle modifications for blood pressure control and reduction: including making healthy food choices, managing weight, getting regular exercise, smoking cessation, reducing alcohol consumption, monitoring blood pressure regularly.     Symptoms: None    BP Goal:Other: not at goal    BP Assessment:  Blood presure high       Potential Reasons for BP too high: NA - Not applicable    BP Follow-Up Plan: Recheck BP in 30 days at pharmacy    Recommendation to Provider: n/a    Note completed by: Estrella Liao, Pharm D  [facility]  [phone]

## 2022-03-16 NOTE — PROGRESS NOTES
"SUBJECTIVE:   Nannette Awad is a 67 year old female who presents for Preventive Visit.      Patient has been advised of split billing requirements and indicates understanding: Yes  Are you in the first 12 months of your Medicare coverage?  No    Healthy Habits:     In general, how would you rate your overall health?  Good    Frequency of exercise:  2-3 days/week    Duration of exercise:  Less than 15 minutes    Do you usually eat at least 4 servings of fruit and vegetables a day, include whole grains    & fiber and avoid regularly eating high fat or \"junk\" foods?  No    Taking medications regularly:  Yes    Medication side effects:  Lightheadedness    Ability to successfully perform activities of daily living:  No assistance needed    Home Safety:  No safety concerns identified    Hearing Impairment:  Difficulty following a conversation in a noisy restaurant or crowded room, need to ask people to speak up or repeat themselves, find that men's voices are easier to understand than woman's and difficulty understanding soft or whispered speech    In the past 6 months, have you been bothered by leaking of urine? Yes    In general, how would you rate your overall mental or emotional health?  Good      PHQ-2 Total Score: 0    Additional concerns today:  Yes    Do you feel safe in your environment? Yes    Have you ever done Advance Care Planning? (For example, a Health Directive, POLST, or a discussion with a medical provider or your loved ones about your wishes): No, advance care planning information given to patient to review.  Patient plans to discuss their wishes with loved ones or provider.         Fall risk  Fallen 2 or more times in the past year?: No  Any fall with injury in the past year?: No    Cognitive Screening   1) Repeat 3 items (Leader, Season, Table)    2) Clock draw: NORMAL  3) 3 item recall: Recalls 3 objects  Results: NORMAL clock, 1-2 items recalled: COGNITIVE IMPAIRMENT LESS LIKELY    Mini-CogTM " Copyright LETICIA Cole. Licensed by the author for use in [facility]; reprinted with permission (sofía@[facility]). All rights reserved.      Do you have sleep apnea, excessive snoring or daytime drowsiness?: no    Reviewed and updated as needed this visit by clinical staff   Tobacco  Allergies  Meds  Problems  Med Hx  Surg Hx  Fam Hx  Soc   Hx        Reviewed and updated as needed this visit by Provider   Tobacco  Allergies  Meds  Problems  Med Hx  Surg Hx  Fam Hx         Social History     Tobacco Use     Smoking status: Never Smoker     Smokeless tobacco: Never Used   Substance Use Topics     Alcohol use: No         Alcohol Use 3/20/2022   Prescreen: >3 drinks/day or >7 drinks/week? No   Prescreen: >3 drinks/day or >7 drinks/week? -               Current providers sharing in care for this patient include:   Patient Care Team:  Joy Dickson MD as PCP - General (Family Practice)  Joy Dickson MD as Assigned PCP  Sil Falcon APRN CNP as Assigned OBGYN Provider  Mirian Carrero MD as Assigned Surgical Provider    The following health maintenance items are reviewed in Epic and correct as of today:  Health Maintenance Due   Topic Date Due     ANNUAL REVIEW OF HM ORDERS  Never done     LIPID  03/04/2022     FALL RISK ASSESSMENT  03/08/2022     A1C  03/15/2022     BMP  03/15/2022     G 3 P 2   No LMP recorded. Patient has had a hysterectomy.     Fasting: Yes    Td: tdap 3/2020       Flu: 9/2021      Covid: Pfi boost 10/4/2021      Shingrix: done      PPV: done      NO - age 63 - see link Cervical Cytology Screening Guidelines  Status post benign hysterectomy. Health Maintenance and Surgical History updated.               Cholesterol:   Lab Results   Component Value Date    CHOL 248 03/04/2021     Lab Results   Component Value Date    HDL 83 03/04/2021     Lab Results   Component Value Date     03/04/2021     Lab Results   Component Value Date    TRIG 220  2021     Lab Results   Component Value Date    CHOLHDLRATIO 2.7 2015         MM2022  Dexa:  3/2019     Flex/colo: 2020 CG q3y      Seat Belt: Yes    Sunscreen use: Yes   Calcium Intake: adeq  Health Care Directive: No  Sexually Active: Yes     Current contraception: hysterectomy  History of abnormal Pap smear: Yes: see psh  Family history of colon/breast/ovarian cancer: Yes: see [facility]  Regular self breast exam: Yes  History of abnormal mammogram: Yes: see reports      Labs reviewed in EPIC  BP Readings from Last 3 Encounters:   22 (!) 142/60   22 (!) 148/68   09/15/21 136/70    Wt Readings from Last 3 Encounters:   22 81.1 kg (178 lb 12.8 oz)   21 84 kg (185 lb 3.2 oz)   21 83.7 kg (184 lb 9.6 oz)                  Patient Active Problem List   Diagnosis     Hyperlipidemia LDL goal <130     Hypertension goal BP (blood pressure) < 150/90     Advanced directives, counseling/discussion     Osteopenia     GERD (gastroesophageal reflux disease)     Family history of breast cancer     Migraine without status migrainosus, not intractable, unspecified migraine type     Alopecia     Elevated fasting glucose     Vitamin D deficiency     Past Surgical History:   Procedure Laterality Date     BREAST BIOPSY, CORE RT/LT       COLONOSCOPY       ESOPHAGOSCOPY, GASTROSCOPY, DUODENOSCOPY (EGD), COMBINED  2012    Procedure: COMBINED ESOPHAGOSCOPY, GASTROSCOPY, DUODENOSCOPY (EGD), BIOPSY SINGLE OR MULTIPLE;  EGD-GERD;  Surgeon: Teo Alvarado MD;  Location: [facility] OR     HYSTERECTOMY, PAP NO LONGER INDICATED  2006    vaginal hyst with bilateral sal-ooph, HSIL     LIGATN/STRIP LONG OR SHORT SAPHEN      right leg     SURGICAL HISTORY OF -       conization for abnl PAP       Social History     Tobacco Use     Smoking status: Never Smoker     Smokeless tobacco: Never Used   Substance Use Topics     Alcohol use: No     Family History   Problem Relation Age of Onset      Heart Disease Mother         CHF     Hypertension Mother      Lipids Mother      Neurologic Disorder Mother 30        migraines     Alzheimer Disease Mother 67     Cancer Maternal Grandmother      Hypertension Brother      Cerebrovascular Disease Brother      Hypertension Brother      Thyroid Disease Paternal Grandmother      Breast Cancer Paternal Grandmother      Cancer Father 76        Pancreatic ca age 76     Other Cancer Father         pancreatic     Prostate Cancer Father      Breast Cancer Maternal Aunt          Current Outpatient Medications   Medication Sig Dispense Refill     amLODIPine (NORVASC) 10 MG tablet TAKE 1 TABLET DAILY 90 tablet 1     Calcium Carbonate-Vitamin D (CALCIUM 500 + D PO) Take  by mouth. One tab three time a day  .        clobetasol (TEMOVATE) 0.05 % external ointment Apply topically 2 times daily       Cyanocobalamin (VITAMIN B-12 PO) Take 500 mcg by mouth daily       lisinopril (ZESTRIL) 10 MG tablet TAKE 1 TABLET DAILY 90 tablet 1     MULTI-DAY OR daily       omeprazole (PRILOSEC) 20 MG DR capsule TAKE 1 CAPSULE EVERY DAY 90 capsule 3     pravastatin (PRAVACHOL) 40 MG tablet TAKE 1 TABLET EVERY DAY 90 tablet 3     sertraline (ZOLOFT) 100 MG tablet TAKE ONE AND ONE-HALF TABLETS ONCE DAILY 135 tablet 1     SUMAtriptan (IMITREX) 50 MG tablet TAKE 1 TABLET  AT ONSET OF HEADACHE FOR MIGRAINE MAY REPEAT IN 2 HOURS. MAX 4 TABLETS/24 HOURS. 27 tablet 1     triamcinolone (KENALOG) 0.1 % external cream Apply topically 2 times daily       VITAMIN D 1000 UNIT OR TABS 1 TABLET DAILY       zolpidem (AMBIEN) 5 MG tablet Take 1 tablet (5 mg) by mouth nightly as needed for sleep 90 tablet 1         FHS-7:   Breast CA Risk Assessment (FHS-7) 3/20/2022   Did any of your first-degree relatives have breast or ovarian cancer? Yes   Did any of your relatives have bilateral breast cancer? No   Did any man in your family have breast cancer? No   Did any woman in your family have breast and ovarian  "cancer? No   Did any woman in your family have breast cancer before age 50 y? Yes   Do you have 2 or more relatives with breast and/or ovarian cancer? No   Do you have 2 or more relatives with breast and/or bowel cancer? No       Mammogram Screening: Recommended mammography every 1-2 years with patient discussion and risk factor consideration  Pertinent mammograms are reviewed under the imaging tab.    Review of Systems   Constitutional: Negative for chills and fever.   HENT: Positive for hearing loss. Negative for congestion, ear pain and sore throat.    Eyes: Negative for pain and visual disturbance.   Respiratory: Negative for cough and shortness of breath.    Cardiovascular: Negative for chest pain, palpitations and peripheral edema.   Gastrointestinal: Negative for abdominal pain, constipation, diarrhea, heartburn, hematochezia and nausea.   Breasts:  Negative for tenderness, breast mass and discharge.   Genitourinary: Negative for dysuria, frequency, genital sores, hematuria, pelvic pain, urgency, vaginal bleeding and vaginal discharge.   Musculoskeletal: Positive for arthralgias. Negative for joint swelling and myalgias.   Skin: Positive for rash.   Neurological: Positive for dizziness and headaches. Negative for weakness and paresthesias.   Psychiatric/Behavioral: Negative for mood changes. The patient is not nervous/anxious.          OBJECTIVE:   BP (!) 142/60   Pulse 86   Temp 98.4  F (36.9  C) (Oral)   Resp 20   Ht 1.626 m (5' 4\")   Wt 81.1 kg (178 lb 12.8 oz)   SpO2 98%   BMI 30.69 kg/m   Estimated body mass index is 30.69 kg/m  as calculated from the following:    Height as of this encounter: 1.626 m (5' 4\").    Weight as of this encounter: 81.1 kg (178 lb 12.8 oz).  Physical Exam  GENERAL APPEARANCE: healthy, alert and no distress  EYES: Eyes grossly normal to inspection, PERRL and conjunctivae and sclerae normal  HENT: ear canals and TM's normal, nose and mouth without ulcers or lesions, " oropharynx clear and oral mucous membranes moist  NECK: no adenopathy, no asymmetry, masses, or scars and thyroid normal to palpation  RESP: lungs clear to auscultation - no rales, rhonchi or wheezes  BREAST: normal without masses, tenderness or nipple discharge and no palpable axillary masses or adenopathy  CV: regular rate and rhythm, normal S1 S2, no S3 or S4, no murmur, click or rub, no peripheral edema and peripheral pulses strong  ABDOMEN: soft, nontender, no hepatosplenomegaly, no masses and bowel sounds normal  MS: no musculoskeletal defects are noted and gait is age appropriate without ataxia  SKIN: no suspicious lesions or rashes  NEURO: Normal strength and tone, sensory exam grossly normal, mentation intact and speech normal  PSYCH: mentation appears normal and affect normal/bright    Diagnostic Test Results:  Labs reviewed in Epic    ASSESSMENT / PLAN:   (Z00.00) Encounter for Medicare annual wellness exam  (primary encounter diagnosis)  Comment: preventive needs reviewed   Plan: see orders in Epic.     (I10) Hypertension goal BP (blood pressure) < 150/90  Comment: to goal  Plan: BASIC METABOLIC PANEL, amLODIPine (NORVASC) 10         MG tablet, lisinopril (ZESTRIL) 10 MG tablet,         OFFICE/OUTPT VISIT,EST,LEVL IV         Refill x 6 months f/u 6 months for Pharm/anc bp check and labs     (K21.9) Gastroesophageal reflux disease without esophagitis  Comment: stable, controlled  Plan: omeprazole (PRILOSEC) 20 MG DR capsule,         OFFICE/OUTPT VISIT,EST,LEVL IV        Refill x 1 yr    (E78.5) Hyperlipidemia LDL goal <130  Comment: to goal  Plan: Lipid panel reflex to direct LDL Fasting,         pravastatin (PRAVACHOL) 40 MG tablet,         OFFICE/OUTPT VISIT,EST,LEVL IV        Refill x 1 yr     (R51.9) Facial pain  Comment: stable  Plan: sertraline (ZOLOFT) 100 MG tablet, OFFICE/OUTPT        VISIT,EST,LEVL IV         Refill x 6 months     (G43.909) Migraine without status migrainosus, not intractable,  "unspecified migraine type  Comment: controlled  Plan: SUMAtriptan (IMITREX) 50 MG tablet,         OFFICE/OUTPT VISIT,EST,LEVL IV        Refill x 1 yr     (G47.00) Insomnia, unspecified type  Comment: stable, no adverse effects  Plan: zolpidem (AMBIEN) 5 MG tablet, OFFICE/OUTPT         VISIT,EST,LEVL IV         Refill x 6 months     (R73.03) Prediabetes  Comment: due  Plan: HEMOGLOBIN A1C, OFFICE/OUTPT VISIT,EST,LEVL IV                  COUNSELING:  Reviewed preventive health counseling, as reflected in patient instructions  Special attention given to:       Regular exercise       Healthy diet/nutrition    Estimated body mass index is 30.69 kg/m  as calculated from the following:    Height as of this encounter: 1.626 m (5' 4\").    Weight as of this encounter: 81.1 kg (178 lb 12.8 oz).        She reports that she has never smoked. She has never used smokeless tobacco.      Appropriate preventive services were discussed with this patient, including applicable screening as appropriate for cardiovascular disease, diabetes, osteopenia/osteoporosis, and glaucoma.  As appropriate for age/gender, discussed screening for colorectal cancer, prostate cancer, breast cancer, and cervical cancer. Checklist reviewing preventive services available has been given to the patient.    Reviewed patients plan of care and provided an AVS. The Intermediate Care Plan ( asthma action plan, low back pain action plan, and migraine action plan) for Nannette meets the Care Plan requirement. This Care Plan has been established and reviewed with the Patient.    Counseling Resources:  ATP IV Guidelines  Pooled Cohorts Equation Calculator  Breast Cancer Risk Calculator  Breast Cancer: Medication to Reduce Risk  FRAX Risk Assessment  ICSI Preventive Guidelines  Dietary Guidelines for Americans, 2010  USDA's MyPlate  ASA Prophylaxis  Lung CA Screening    Joy Dickson MD  [facility]    Identified Health Risks:  "

## 2022-03-16 NOTE — PATIENT INSTRUCTIONS
Patient Education   Personalized Prevention Plan  You are due for the preventive services outlined below.  Your care team is available to assist you in scheduling these services.  If you have already completed any of these items, please share that information with your care team to update in your medical record.  Health Maintenance Due   Topic Date Due     ANNUAL REVIEW OF HM ORDERS  Never done     Cholesterol Lab  03/04/2022     FALL RISK ASSESSMENT  03/08/2022     A1C Lab  03/15/2022     Basic Metabolic Panel  03/15/2022         Continue all medications.

## 2022-03-20 ASSESSMENT — ENCOUNTER SYMPTOMS
SORE THROAT: 0
HEARTBURN: 0
MYALGIAS: 0
CONSTIPATION: 0
CHILLS: 0
FEVER: 0
HEMATURIA: 0
ARTHRALGIAS: 1
WEAKNESS: 0
NERVOUS/ANXIOUS: 0
FREQUENCY: 0
NAUSEA: 0
HEADACHES: 1
DIZZINESS: 1
DYSURIA: 0
PARESTHESIAS: 0
EYE PAIN: 0
DIARRHEA: 0
SHORTNESS OF BREATH: 0
PALPITATIONS: 0
ABDOMINAL PAIN: 0
BREAST MASS: 0
JOINT SWELLING: 0
COUGH: 0
HEMATOCHEZIA: 0

## 2022-03-20 ASSESSMENT — ACTIVITIES OF DAILY LIVING (ADL): CURRENT_FUNCTION: NO ASSISTANCE NEEDED

## 2022-03-23 ENCOUNTER — OFFICE VISIT (OUTPATIENT)
Dept: FAMILY MEDICINE | Facility: CLINIC | Age: 67
End: 2022-03-23
Payer: COMMERCIAL

## 2022-03-23 VITALS
DIASTOLIC BLOOD PRESSURE: 62 MMHG | SYSTOLIC BLOOD PRESSURE: 132 MMHG | BODY MASS INDEX: 30.52 KG/M2 | OXYGEN SATURATION: 98 % | TEMPERATURE: 98.4 F | HEIGHT: 64 IN | HEART RATE: 86 BPM | RESPIRATION RATE: 20 BRPM | WEIGHT: 178.8 LBS

## 2022-03-23 DIAGNOSIS — G47.00 INSOMNIA, UNSPECIFIED TYPE: ICD-10-CM

## 2022-03-23 DIAGNOSIS — R51.9 FACIAL PAIN: ICD-10-CM

## 2022-03-23 DIAGNOSIS — Z00.00 ENCOUNTER FOR MEDICARE ANNUAL WELLNESS EXAM: Primary | ICD-10-CM

## 2022-03-23 DIAGNOSIS — R73.03 PREDIABETES: ICD-10-CM

## 2022-03-23 DIAGNOSIS — G43.909 MIGRAINE WITHOUT STATUS MIGRAINOSUS, NOT INTRACTABLE, UNSPECIFIED MIGRAINE TYPE: ICD-10-CM

## 2022-03-23 DIAGNOSIS — K21.9 GASTROESOPHAGEAL REFLUX DISEASE WITHOUT ESOPHAGITIS: ICD-10-CM

## 2022-03-23 DIAGNOSIS — I10 HYPERTENSION GOAL BP (BLOOD PRESSURE) < 150/90: ICD-10-CM

## 2022-03-23 DIAGNOSIS — E78.5 HYPERLIPIDEMIA LDL GOAL <130: ICD-10-CM

## 2022-03-23 LAB
ANION GAP SERPL CALCULATED.3IONS-SCNC: 7 MMOL/L (ref 3–14)
BUN SERPL-MCNC: 21 MG/DL (ref 7–30)
CALCIUM SERPL-MCNC: 10.5 MG/DL (ref 8.5–10.1)
CHLORIDE BLD-SCNC: 106 MMOL/L (ref 94–109)
CHOLEST SERPL-MCNC: 234 MG/DL
CO2 SERPL-SCNC: 25 MMOL/L (ref 20–32)
CREAT SERPL-MCNC: 0.83 MG/DL (ref 0.52–1.04)
FASTING STATUS PATIENT QL REPORTED: YES
GFR SERPL CREATININE-BSD FRML MDRD: 76 ML/MIN/1.73M2
GLUCOSE BLD-MCNC: 112 MG/DL (ref 70–99)
HBA1C MFR BLD: 6 % (ref 0–5.6)
HDLC SERPL-MCNC: 95 MG/DL
LDLC SERPL CALC-MCNC: 112 MG/DL
NONHDLC SERPL-MCNC: 139 MG/DL
POTASSIUM BLD-SCNC: 4.4 MMOL/L (ref 3.4–5.3)
SODIUM SERPL-SCNC: 138 MMOL/L (ref 133–144)
TRIGL SERPL-MCNC: 137 MG/DL

## 2022-03-23 PROCEDURE — 36415 COLL VENOUS BLD VENIPUNCTURE: CPT | Performed by: FAMILY MEDICINE

## 2022-03-23 PROCEDURE — 99397 PER PM REEVAL EST PAT 65+ YR: CPT | Performed by: FAMILY MEDICINE

## 2022-03-23 PROCEDURE — 80048 BASIC METABOLIC PNL TOTAL CA: CPT | Performed by: FAMILY MEDICINE

## 2022-03-23 PROCEDURE — 83036 HEMOGLOBIN GLYCOSYLATED A1C: CPT | Performed by: FAMILY MEDICINE

## 2022-03-23 PROCEDURE — 80061 LIPID PANEL: CPT | Performed by: FAMILY MEDICINE

## 2022-03-23 PROCEDURE — 99214 OFFICE O/P EST MOD 30 MIN: CPT | Mod: 25 | Performed by: FAMILY MEDICINE

## 2022-03-23 RX ORDER — CLOBETASOL PROPIONATE 0.5 MG/G
OINTMENT TOPICAL 2 TIMES DAILY
COMMUNITY
End: 2024-04-03

## 2022-03-23 RX ORDER — TRIAMCINOLONE ACETONIDE 1 MG/G
CREAM TOPICAL 2 TIMES DAILY
COMMUNITY
End: 2023-03-27

## 2022-03-23 RX ORDER — PRAVASTATIN SODIUM 40 MG
TABLET ORAL
Qty: 90 TABLET | Refills: 3 | Status: SHIPPED | OUTPATIENT
Start: 2022-03-23 | End: 2023-03-27

## 2022-03-23 RX ORDER — LISINOPRIL 10 MG/1
TABLET ORAL
Qty: 90 TABLET | Refills: 1 | Status: SHIPPED | OUTPATIENT
Start: 2022-03-23 | End: 2022-10-27

## 2022-03-23 RX ORDER — ZOLPIDEM TARTRATE 5 MG/1
5 TABLET ORAL
Qty: 90 TABLET | Refills: 1 | Status: SHIPPED | OUTPATIENT
Start: 2022-03-23 | End: 2023-03-27

## 2022-03-23 RX ORDER — SUMATRIPTAN 50 MG/1
TABLET, FILM COATED ORAL
Qty: 27 TABLET | Refills: 1 | Status: SHIPPED | OUTPATIENT
Start: 2022-03-23 | End: 2023-03-27

## 2022-03-23 RX ORDER — SERTRALINE HYDROCHLORIDE 100 MG/1
TABLET, FILM COATED ORAL
Qty: 135 TABLET | Refills: 1 | Status: SHIPPED | OUTPATIENT
Start: 2022-03-23 | End: 2022-10-27

## 2022-03-23 RX ORDER — AMLODIPINE BESYLATE 10 MG/1
TABLET ORAL
Qty: 90 TABLET | Refills: 1 | Status: SHIPPED | OUTPATIENT
Start: 2022-03-23 | End: 2022-10-27

## 2022-03-23 ASSESSMENT — ENCOUNTER SYMPTOMS
BREAST MASS: 0
NERVOUS/ANXIOUS: 0
HEADACHES: 1
NAUSEA: 0
MYALGIAS: 0
FREQUENCY: 0
DIZZINESS: 1
CHILLS: 0
JOINT SWELLING: 0
HEMATURIA: 0
SHORTNESS OF BREATH: 0
COUGH: 0
PARESTHESIAS: 0
HEARTBURN: 0
WEAKNESS: 0
PALPITATIONS: 0
CONSTIPATION: 0
HEMATOCHEZIA: 0
FEVER: 0
EYE PAIN: 0
SORE THROAT: 0
DYSURIA: 0
DIARRHEA: 0
ABDOMINAL PAIN: 0
ARTHRALGIAS: 1

## 2022-03-23 ASSESSMENT — PAIN SCALES - GENERAL: PAINLEVEL: MODERATE PAIN (4)

## 2022-03-23 ASSESSMENT — ACTIVITIES OF DAILY LIVING (ADL): CURRENT_FUNCTION: NO ASSISTANCE NEEDED

## 2022-03-23 NOTE — NURSING NOTE
"Chief Complaint   Patient presents with     Wellness Visit       Initial BP (!) 172/74   Pulse 86   Temp 98.4  F (36.9  C) (Oral)   Resp 20   Ht 1.626 m (5' 4\")   Wt 81.1 kg (178 lb 12.8 oz)   SpO2 98%   BMI 30.69 kg/m   Estimated body mass index is 30.69 kg/m  as calculated from the following:    Height as of this encounter: 1.626 m (5' 4\").    Weight as of this encounter: 81.1 kg (178 lb 12.8 oz).  Medication Reconciliation: complete  Carlos Brooks CMA    "

## 2022-04-20 ENCOUNTER — OFFICE VISIT (OUTPATIENT)
Dept: FAMILY MEDICINE | Facility: CLINIC | Age: 67
End: 2022-04-20
Payer: COMMERCIAL

## 2022-04-20 ENCOUNTER — ANCILLARY PROCEDURE (OUTPATIENT)
Dept: GENERAL RADIOLOGY | Facility: CLINIC | Age: 67
End: 2022-04-20
Attending: NURSE PRACTITIONER
Payer: COMMERCIAL

## 2022-04-20 VITALS
DIASTOLIC BLOOD PRESSURE: 69 MMHG | BODY MASS INDEX: 30.39 KG/M2 | TEMPERATURE: 98.6 F | HEART RATE: 68 BPM | HEIGHT: 64 IN | WEIGHT: 178 LBS | OXYGEN SATURATION: 99 % | SYSTOLIC BLOOD PRESSURE: 111 MMHG

## 2022-04-20 DIAGNOSIS — M25.512 ACUTE PAIN OF LEFT SHOULDER: ICD-10-CM

## 2022-04-20 DIAGNOSIS — S12.101A CLOSED NONDISPLACED FRACTURE OF SECOND CERVICAL VERTEBRA, UNSPECIFIED FRACTURE MORPHOLOGY, INITIAL ENCOUNTER (H): ICD-10-CM

## 2022-04-20 DIAGNOSIS — M54.2 NECK PAIN: ICD-10-CM
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
## 2022-04-20 PROCEDURE — 72040 X-RAY EXAM NECK SPINE 2-3 VW: CPT | Performed by: RADIOLOGY

## 2022-04-20 PROCEDURE — 99207 PR INPT ADMISSION FROM CLINIC: CPT | Performed by: NURSE PRACTITIONER

## 2022-04-20 PROCEDURE — 73030 X-RAY EXAM OF SHOULDER: CPT | Mod: LT | Performed by: RADIOLOGY

## 2022-04-20 RX ORDER — TIZANIDINE 2 MG/1
2-4 TABLET ORAL 3 TIMES DAILY PRN
Qty: 30 TABLET | Refills: 0 | Status: SHIPPED | OUTPATIENT
Start: 2022-04-20 | End: 2023-03-27

## 2022-04-20 RX ORDER — HYDROCODONE BITARTRATE AND ACETAMINOPHEN 5; 325 MG/1; MG/1
1 TABLET ORAL EVERY 6 HOURS PRN
Qty: 15 TABLET | Refills: 0 | Status: SHIPPED | OUTPATIENT
Start: 2022-04-20 | End: 2023-03-27

## 2022-04-20 ASSESSMENT — PAIN SCALES - GENERAL: PAINLEVEL: WORST PAIN (10)

## 2022-04-20 NOTE — PATIENT INSTRUCTIONS
I will be in touch with xray results.       Continue ibuprofen 600 mg every 6 hours- take with food.   Continue alternating heat and ice.   For tight muscles/pain, can try tizanidine 2-4 mg up to three times per day.  May cause sedation.   For pain not controlled with ibuprofen, can take Norco 1 pill every 6 hours as needed.  This may also cause sedation.  Do not mix with tizanidine.     Make sure you are eating and drinking.  Change positions slowly.    Referral to PT to help with neck muscle tightness/pain.     Your should slowly be doing range of motion with your neck as tolerated.

## 2022-04-20 NOTE — PROGRESS NOTES
Assessment & Plan       Addendum:     Closed nondisplaced fracture of second cervical vertebra, unspecified fracture morphology, initial encounter (H)  Received radiology report at approximately 4:30 noting mildly compressed fracture of the right lateral mass of C2.  I called radiology and spoke to on-call radiologist who reviewed images.  Per radiologist this is not a standard compression fracture and is near joint of C1-C2 and there could be potential for instability or other fractures not seen on xray, he recommended CT of her cervical spine as soon as possible and that patient should likely be sent to the ER.   I called patient who reported she was having more pain at home since being at the clinic.  She was instructed to go to the ER, she plans to go to [facility].       Cervical spine xray-   IMPRESSION: There is a mildly compressed fracture of the right lateral  mass of C2. No other definite fractures. Mild degenerative  anterolisthesis of C3 upon C4. Mild degenerative retrolisthesis of C4  on C5. Mild degenerative anterolisthesis of C5 upon C6 and C7 upon T1.  Alignment otherwise normal.              Patient was sent home with below plan, while awaiting results of xray.  Addendum above.       Fall, initial encounter  - XR Cervical Spine 2/3 Views; Future    Neck pain  - XR Cervical Spine 2/3 Views; Future  - tiZANidine (ZANAFLEX) 2 MG tablet; Take 1-2 tablets (2-4 mg) by mouth 3 times daily as needed for muscle spasms  - HYDROcodone-acetaminophen (NORCO) 5-325 MG tablet; Take 1 tablet by mouth every 6 hours as needed for severe pain  - Physical Therapy Referral; Future    Acute pain of left shoulder  Xray shoulder is without acute fracture.     - HYDROcodone-acetaminophen (NORCO) 5-325 MG tablet; Take 1 tablet by mouth every 6 hours as needed for severe pain      No follow-ups on file.    Mandy Calloway, [facility]        Addendum-     Subjective   Pat is a 67 year old who  presents for the following health issues     Fall    History of Present Illness       Reason for visit:  Fell and hurt my neck  Symptom onset:  3-7 days ago  Symptom intensity:  Severe  Symptom progression:  Worsening  Had these symptoms before:  No  What makes it worse:  Moving my head  What makes it better:  Heat pack    She eats 0-1 servings of fruits and vegetables daily.She consumes 1 sweetened beverage(s) daily.She exercises with enough effort to increase her heart rate 9 or less minutes per day.  She exercises with enough effort to increase her heart rate 3 or less days per week. She is missing 7 dose(s) of medications per week.         Here after fall.  Main concern is neck pain, but has other areas of pain.      Fell last Saturday.  Had been dealing with a GI illness.  Was sitting on the toilet having severe abdominal cramps and diarrhea.  Felt like she was going to pass out.  Has a hx of vasovagal syncope.   Went to lower herself on the floor just in case.  She woke up and was laying on her left side, states like fetal position, with her head tucked tight chin to chest.  Immediately didn't have pain, but was sore by that night.    Neck is getting worse, very stiff.  States excruciating pain.  Feels like having lightening strikes, random sharp pain sometimes going up her head.     Not on any blood thinners.    First few days she used cold packs, didn't help.  Took ibupofen 600 mg with food, didn't notice any difference in pain level.  Tried heat pack, helped some  Denies any radiation of neck pain into her arms.  No numbness, tingling or weakness in extremities.     Denies any vision changes, severe headache, confusion or troubles with speech.   Her left shoulders has been bothering her and she noticed a bump on the left side of her head.  She has a bruise on her right knee.     Diarrhea has resolved.  Lasted 24 hours.  No vomiting.  She is not sure if she had a fever.       Review of Systems  "  Constitutional, HEENT, cardiovascular, pulmonary, gi and gu systems are negative, except as otherwise noted.      Objective    /69   Pulse 68   Temp 98.6  F (37  C)   Ht 1.626 m (5' 4\")   Wt 80.7 kg (178 lb)   SpO2 99%   BMI 30.55 kg/m    Body mass index is 30.55 kg/m .  Physical Exam   GENERAL: healthy, alert and no distress  NECK: no adenopathy, no asymmetry, masses, or scars and thyroid normal to palpation  RESP: lungs clear to auscultation - no rales, rhonchi or wheezes  CV: regular rate and rhythm, normal S1 S2, no S3 or S4, no murmur, click or rub, no peripheral edema and peripheral pulses strong  MS: Left shoulder- faint bruise over lateral shoulder, pain with minimal palpation, would not have tolerated ROM.  Neck- direct cervical spinal tenderness, very tense upper trapezius muscles R>L, small lump left lateral scalp.     SKIN: no suspicious lesions or rashes  NEURO: Normal strength and tone, mentation intact and speech normal  PSYCH: mentation appears normal, affect normal/bright          "

## 2022-05-04 ENCOUNTER — OFFICE VISIT (OUTPATIENT)
Dept: FAMILY MEDICINE | Facility: CLINIC | Age: 67
End: 2022-05-04

## 2022-05-04 ENCOUNTER — ALLIED HEALTH/NURSE VISIT (OUTPATIENT)
Dept: FAMILY MEDICINE | Facility: CLINIC | Age: 67
End: 2022-05-04
Payer: COMMERCIAL

## 2022-05-04 VITALS
SYSTOLIC BLOOD PRESSURE: 130 MMHG | TEMPERATURE: 98.2 F | RESPIRATION RATE: 22 BRPM | BODY MASS INDEX: 29.7 KG/M2 | HEART RATE: 85 BPM | WEIGHT: 173 LBS | OXYGEN SATURATION: 96 % | DIASTOLIC BLOOD PRESSURE: 70 MMHG

## 2022-05-04 VITALS — SYSTOLIC BLOOD PRESSURE: 136 MMHG | DIASTOLIC BLOOD PRESSURE: 70 MMHG | HEART RATE: 66 BPM

## 2022-05-04 DIAGNOSIS — S12.101D CLOSED NONDISPLACED FRACTURE OF SECOND CERVICAL VERTEBRA WITH ROUTINE HEALING, UNSPECIFIED FRACTURE MORPHOLOGY, SUBSEQUENT ENCOUNTER: ICD-10-CM

## 2022-05-04 DIAGNOSIS — Z09 HOSPITAL DISCHARGE FOLLOW-UP: Primary | ICD-10-CM

## 2022-05-04 DIAGNOSIS — S12.040D: ICD-10-CM

## 2022-05-04 DIAGNOSIS — Z01.30 BLOOD PRESSURE CHECK: Primary | ICD-10-CM

## 2022-05-04 PROBLEM — S12.100A CLOSED FRACTURE OF SECOND CERVICAL VERTEBRA (H): Status: ACTIVE | Noted: 2022-04-20

## 2022-05-04 PROBLEM — S12.000A CLOSED FRACTURE OF FIRST CERVICAL VERTEBRA (H): Status: ACTIVE | Noted: 2022-04-20

## 2022-05-04 PROCEDURE — 99207 PR DROP WITH A PROCEDURE: CPT | Mod: 25 | Performed by: FAMILY MEDICINE

## 2022-05-04 PROCEDURE — 99214 OFFICE O/P EST MOD 30 MIN: CPT | Performed by: FAMILY MEDICINE

## 2022-05-04 RX ORDER — HYDROXYZINE PAMOATE 50 MG/1
50 CAPSULE ORAL 3 TIMES DAILY PRN
COMMUNITY
Start: 2022-04-22 | End: 2023-03-27

## 2022-05-04 RX ORDER — HYDROMORPHONE HYDROCHLORIDE 2 MG/1
2-4 TABLET ORAL EVERY 4 HOURS PRN
Qty: 60 TABLET | Refills: 0 | Status: SHIPPED | OUTPATIENT
Start: 2022-05-04 | End: 2023-03-27

## 2022-05-04 RX ORDER — TIZANIDINE 2 MG/1
2-4 TABLET ORAL PRN
COMMUNITY
Start: 2022-04-20 | End: 2023-03-27

## 2022-05-04 RX ORDER — METHOCARBAMOL 500 MG/1
500 TABLET, FILM COATED ORAL EVERY 6 HOURS
COMMUNITY
Start: 2022-04-22 | End: 2023-03-27

## 2022-05-04 RX ORDER — HYDROMORPHONE HYDROCHLORIDE 2 MG/1
2-4 TABLET ORAL
COMMUNITY
Start: 2022-04-22 | End: 2022-05-04

## 2022-05-04 ASSESSMENT — PAIN SCALES - GENERAL: PAINLEVEL: SEVERE PAIN (6)

## 2022-05-04 NOTE — PROGRESS NOTES
Nannette Awad was evaluated at [facility] on May 4, 2022 at which time her blood pressure was:    BP Readings from Last 3 Encounters:   05/04/22 136/70   05/04/22 130/70   04/20/22 111/69     Pulse Readings from Last 3 Encounters:   05/04/22 66   05/04/22 85   04/20/22 68       Reviewed lifestyle modifications for blood pressure control and reduction: including making healthy food choices, managing weight, getting regular exercise, smoking cessation, reducing alcohol consumption, monitoring blood pressure regularly.     Symptoms: None    BP Goal:< 140/90 mmHg    BP Assessment:  BP at goal    Potential Reasons for BP too high: NA - Not applicable    BP Follow-Up Plan: Recheck BP in 6 months at pharmacy    Recommendation to Provider: none    Note completed by: Tanvir Bentley RPh.  [facility]  [phone]

## 2022-05-04 NOTE — NURSING NOTE
"Chief Complaint   Patient presents with     Hospital F/U       Initial BP (!) 173/88   Pulse 85   Temp 98.2  F (36.8  C) (Oral)   Resp 22   Wt 78.5 kg (173 lb)   SpO2 96%   BMI 29.70 kg/m   Estimated body mass index is 29.7 kg/m  as calculated from the following:    Height as of 4/20/22: 1.626 m (5' 4\").    Weight as of this encounter: 78.5 kg (173 lb).  Medication Reconciliation: complete  Carlos Brooks CMA    "

## 2022-05-04 NOTE — PATIENT INSTRUCTIONS
Call neurosurgeon to see if you can cut the padding that is rubbing on your ear.      Ok to take hydroxyzine with dilaudid to help it work better.      Ok to sleep in the recliner.

## 2022-05-04 NOTE — PROGRESS NOTES
Assessment & Plan     (Z09) Hospital discharge follow-up  (primary encounter diagnosis)  (S12.101D) Closed nondisplaced fracture of second cervical vertebra with routine healing, unspecified fracture morphology, subsequent encounter  (S12.040D) Closed displaced lateral mass fracture of first cervical vertebra with routine healing  Comment: seems to be healing  Plan: HYDROmorphone (DILAUDID) 2 MG tablet        Recommend keep follow-up  Dilaudid refilled, educated on using hydroxyzine for augmentation of pain control  Consider sleeping in recliner for comfort.  Using cane for stability, adjusted to fit pt.                Patient Instructions       Call neurosurgeon to see if you can cut the padding that is rubbing on your ear.      Ok to take hydroxyzine with dilaudid to help it work better.      Ok to sleep in the recliner.        Return in about 5 months (around 10/4/2022) for Non-fasting Lab Work.    Joy Dickson MD  [facility] is a 67 year old who presents for the following health issues     HPI       Hospital Follow-up Visit:    Hospital/Nursing Home/ Rehab Facility: [facility]  Date of Admission: 4/20/22  Date of Discharge: 4/22/22  Reason(s) for Admission: Closed displaced lateral mass fracture of first cervical vertebra, syncope and collapse       Was your hospitalization related to COVID-19? No   Problems taking medications regularly:  None  Medication changes since discharge: updated in [facility]   Problems adhering to non-medication therapy:  None given     Summary of hospitalization:  CareEverywhere information obtained and reviewed  Diagnostic Tests/Treatments reviewed.  Follow up needed: continuous neck collar use,   Other Healthcare Providers Involved in Patient s Care:         Surgical follow-up appointment - May 20 with neurosurgery  Update since discharge: improved.  Out of dilaudid, does request a refill.      Post Discharge Medication Reconciliation:  discharge medications reconciled, continue medications without change.  Plan of care communicated with patient                  Review of Systems   Constitutional, HEENT, cardiovascular, pulmonary, gi and gu systems are negative, except as otherwise noted.      Objective    /70   Pulse 85   Temp 98.2  F (36.8  C) (Oral)   Resp 22   Wt 78.5 kg (173 lb)   SpO2 96%   BMI 29.70 kg/m    Body mass index is 29.7 kg/m .  Physical Exam   GENERAL: alert, no distress and in collar  MS: no gross musculoskeletal defects noted, no edema  SKIN: no suspicious lesions or rashes  PSYCH: mentation appears normal, affect normal/bright

## 2022-08-23 ENCOUNTER — TRANSFERRED RECORDS (OUTPATIENT)
Dept: FAMILY MEDICINE | Facility: CLINIC | Age: 67
End: 2022-08-23

## 2022-09-05 ENCOUNTER — MYC MEDICAL ADVICE (OUTPATIENT)
Dept: FAMILY MEDICINE | Facility: CLINIC | Age: 67
End: 2022-09-05

## 2022-09-05 DIAGNOSIS — M81.0 AGE-RELATED OSTEOPOROSIS WITHOUT CURRENT PATHOLOGICAL FRACTURE: ICD-10-CM

## 2022-09-05 DIAGNOSIS — E28.39 ESTROGEN DEFICIENCY: Primary | ICD-10-CM

## 2022-09-20 ENCOUNTER — ANCILLARY PROCEDURE (OUTPATIENT)
Dept: BONE DENSITY | Facility: CLINIC | Age: 67
End: 2022-09-20
Attending: FAMILY MEDICINE
Payer: COMMERCIAL

## 2022-09-20 DIAGNOSIS — E28.39 ESTROGEN DEFICIENCY: ICD-10-CM

## 2022-09-20 PROCEDURE — 77080 DXA BONE DENSITY AXIAL: CPT | Performed by: INTERNAL MEDICINE

## 2022-09-27 ENCOUNTER — MYC MEDICAL ADVICE (OUTPATIENT)
Dept: FAMILY MEDICINE | Facility: CLINIC | Age: 67
End: 2022-09-27

## 2022-10-05 ENCOUNTER — DOCUMENTATION ONLY (OUTPATIENT)
Dept: LAB | Facility: CLINIC | Age: 67
End: 2022-10-05

## 2022-10-05 DIAGNOSIS — R73.03 PREDIABETES: ICD-10-CM

## 2022-10-05 DIAGNOSIS — I10 HYPERTENSION GOAL BP (BLOOD PRESSURE) < 150/90: Primary | ICD-10-CM

## 2022-10-05 NOTE — PROGRESS NOTES
Nannette Awad has an upcoming lab appointment:    Future Appointments   Date Time Provider Department Center   10/7/2022  1:15 PM AN LAB [facility]   10/7/2022  1:45 PM AN MA/LPN [facility]   2/13/2023  9:30 AM Marco Montaño MD [facility]     Patient is scheduled for the following lab(s):     There is no order available. Please review and place either future orders or HMPO (Review of Health Maintenance Protocol Orders), as appropriate.    Health Maintenance Due   Topic     ANNUAL REVIEW OF HM ORDERS      ELY Bowman

## 2022-10-07 ENCOUNTER — IMMUNIZATION (OUTPATIENT)
Dept: FAMILY MEDICINE | Facility: CLINIC | Age: 67
End: 2022-10-07

## 2022-10-07 ENCOUNTER — LAB (OUTPATIENT)
Dept: LAB | Facility: CLINIC | Age: 67
End: 2022-10-07
Payer: COMMERCIAL

## 2022-10-07 DIAGNOSIS — R73.03 PREDIABETES: ICD-10-CM

## 2022-10-07 DIAGNOSIS — Z23 NEED FOR PROPHYLACTIC VACCINATION AND INOCULATION AGAINST INFLUENZA: ICD-10-CM

## 2022-10-07 DIAGNOSIS — Z23 HIGH PRIORITY FOR 2019-NCOV VACCINE: ICD-10-CM

## 2022-10-07 DIAGNOSIS — I10 HYPERTENSION GOAL BP (BLOOD PRESSURE) < 150/90: ICD-10-CM

## 2022-10-07 LAB
ANION GAP SERPL CALCULATED.3IONS-SCNC: 6 MMOL/L (ref 3–14)
BUN SERPL-MCNC: 18 MG/DL (ref 7–30)
CALCIUM SERPL-MCNC: 10.1 MG/DL (ref 8.5–10.1)
CHLORIDE BLD-SCNC: 108 MMOL/L (ref 94–109)
CO2 SERPL-SCNC: 27 MMOL/L (ref 20–32)
CREAT SERPL-MCNC: 0.89 MG/DL (ref 0.52–1.04)
GFR SERPL CREATININE-BSD FRML MDRD: 70 ML/MIN/1.73M2
GLUCOSE BLD-MCNC: 123 MG/DL (ref 70–99)
HBA1C MFR BLD: 5.8 % (ref 0–5.6)
POTASSIUM BLD-SCNC: 4.1 MMOL/L (ref 3.4–5.3)
SODIUM SERPL-SCNC: 141 MMOL/L (ref 133–144)

## 2022-10-07 PROCEDURE — G0008 ADMIN INFLUENZA VIRUS VAC: HCPCS | Mod: 59

## 2022-10-07 PROCEDURE — 83036 HEMOGLOBIN GLYCOSYLATED A1C: CPT

## 2022-10-07 PROCEDURE — 91312 COVID-19,PF,PFIZER BOOSTER BIVALENT: CPT

## 2022-10-07 PROCEDURE — 99207 PR NO CHARGE NURSE ONLY: CPT

## 2022-10-07 PROCEDURE — 36415 COLL VENOUS BLD VENIPUNCTURE: CPT

## 2022-10-07 PROCEDURE — 90662 IIV NO PRSV INCREASED AG IM: CPT

## 2022-10-07 PROCEDURE — 80048 BASIC METABOLIC PNL TOTAL CA: CPT

## 2022-10-07 PROCEDURE — 0124A COVID-19,PF,PFIZER BOOSTER BIVALENT: CPT

## 2022-10-25 DIAGNOSIS — I10 HYPERTENSION GOAL BP (BLOOD PRESSURE) < 150/90: ICD-10-CM

## 2022-10-25 DIAGNOSIS — R51.9 FACIAL PAIN: ICD-10-CM

## 2022-10-26 ENCOUNTER — ALLIED HEALTH/NURSE VISIT (OUTPATIENT)
Dept: FAMILY MEDICINE | Facility: CLINIC | Age: 67
End: 2022-10-26
Payer: COMMERCIAL

## 2022-10-26 ENCOUNTER — TELEPHONE (OUTPATIENT)
Dept: FAMILY MEDICINE | Facility: CLINIC | Age: 67
End: 2022-10-26

## 2022-10-26 VITALS — HEART RATE: 75 BPM | DIASTOLIC BLOOD PRESSURE: 74 MMHG | SYSTOLIC BLOOD PRESSURE: 124 MMHG

## 2022-10-26 DIAGNOSIS — Z01.30 BLOOD PRESSURE CHECK: Primary | ICD-10-CM

## 2022-10-26 PROCEDURE — 99207 PR DROP WITH A PROCEDURE: CPT | Performed by: FAMILY MEDICINE

## 2022-10-26 NOTE — PROGRESS NOTES
Nannette Awad was evaluated at [facility] on October 26, 2022 at which time her blood pressure was:    BP Readings from Last 3 Encounters:   10/26/22 124/74   05/04/22 136/70   05/04/22 130/70     Pulse Readings from Last 3 Encounters:   10/26/22 75   05/04/22 66   05/04/22 85       Reviewed lifestyle modifications for blood pressure control and reduction: including making healthy food choices, managing weight, getting regular exercise, smoking cessation, reducing alcohol consumption, monitoring blood pressure regularly.     Symptoms: None    BP Goal:< 140/90 mmHg    BP Assessment:  BP at goal    Potential Reasons for BP too high: NA - Not applicable    BP Follow-Up Plan: Recheck BP in 6 months at pharmacy    Recommendation to Provider: None    Note completed by: Tanvir Bentley RPh.  [facility]  [phone]

## 2022-10-26 NOTE — TELEPHONE ENCOUNTER
Reason for Call:  Form, our goal is to have forms completed with 72 hours, however, some forms may require a visit or additional information.    Type of letter, form or note:  Vaccination card    Who is the form from?: Patient    Where did the form come from: Patient or family brought in       What clinic location was the form placed at?: [place]    Where the form was placed: provider box    What number is listed as a contact on the form?: [phone]       Additional comments: patient would like the information documented on vaccine card for her last ccovid booster    Call taken on 10/26/2022 at 11:48 AM by Richa Noble

## 2022-10-27 RX ORDER — LISINOPRIL 10 MG/1
TABLET ORAL
Qty: 90 TABLET | Refills: 1 | Status: SHIPPED | OUTPATIENT
Start: 2022-10-27 | End: 2023-03-27

## 2022-10-27 RX ORDER — AMLODIPINE BESYLATE 10 MG/1
TABLET ORAL
Qty: 90 TABLET | Refills: 1 | Status: SHIPPED | OUTPATIENT
Start: 2022-10-27 | End: 2023-03-27

## 2022-10-27 RX ORDER — SERTRALINE HYDROCHLORIDE 100 MG/1
TABLET, FILM COATED ORAL
Qty: 135 TABLET | Refills: 1 | Status: SHIPPED | OUTPATIENT
Start: 2022-10-27 | End: 2023-03-27

## 2022-11-25 NOTE — TELEPHONE ENCOUNTER
RECORDS RECEIVED FROM: internal /ce   DATE RECEIVED: 2.13.23    NOTES (FOR ALL VISITS) STATUS DETAILS   OFFICE NOTES from referring provider internal  Joy Dickson MD   OFFICE NOTES from other specialist     ED NOTES ce Eva 4.20.22 Raissa      MEDICATION LIST internal     IMAGING      DEXASCAN internal  9.20.22     CT (HEAD/NECK/CHEST/ABDOMEN) ce [facility]- 4.21.22, 4.20.22     LABS     DIABETES: HBGA1C, CREATININE, FASTING LIPIDS, MICROALBUMIN URINE, POTASSIUM, TSH, T4    THYROID: TSH, T4, CBC, THYRODLONULIN, TOTAL T3, FREE T4, CALCITONIN, CEA internal /ce

## 2023-02-10 ENCOUNTER — TRANSFERRED RECORDS (OUTPATIENT)
Dept: HEALTH INFORMATION MANAGEMENT | Facility: CLINIC | Age: 68
End: 2023-02-10

## 2023-02-13 ENCOUNTER — VIRTUAL VISIT (OUTPATIENT)
Dept: ENDOCRINOLOGY | Facility: CLINIC | Age: 68
End: 2023-02-13
Payer: COMMERCIAL

## 2023-02-13 ENCOUNTER — PRE VISIT (OUTPATIENT)
Dept: ENDOCRINOLOGY | Facility: CLINIC | Age: 68
End: 2023-02-13

## 2023-02-13 DIAGNOSIS — R94.6 ABNORMAL FINDING ON THYROID FUNCTION TEST: ICD-10-CM

## 2023-02-13 DIAGNOSIS — M85.80 OSTEOPENIA WITH HIGH RISK OF FRACTURE: ICD-10-CM

## 2023-02-13 DIAGNOSIS — R82.994 HYPERCALCIURIA: ICD-10-CM

## 2023-02-13 DIAGNOSIS — M85.9 LOW BONE DENSITY: Primary | ICD-10-CM

## 2023-02-13 DIAGNOSIS — E83.52 HYPERCALCEMIA: ICD-10-CM

## 2023-02-13 PROCEDURE — 99205 OFFICE O/P NEW HI 60 MIN: CPT | Mod: VID | Performed by: INTERNAL MEDICINE

## 2023-02-13 RX ORDER — GABAPENTIN 300 MG/1
300 CAPSULE ORAL 2 TIMES DAILY
COMMUNITY
Start: 2023-01-16 | End: 2023-11-29

## 2023-02-13 NOTE — LETTER
2/13/2023       RE: Nannette Awad  [address]  [place] [facility] [address]     Dear Colleague,    Thank you for referring your patient, Nannette Awad, to the [facility] [place] [facility]. Please see a copy of my visit note below.      ENDOCRINOLOGY VIDEO VISIT NEW        HISTORY OF PRESENT ILLNESS    Nannette Awad (prefers to be called [facility]) is a 67 year old female who is being evaluated via a billable video visit.  Part of this visit was held by video, the remainder by phone due to difficulty with audio on [employer].    The patient is seen in consultation at the request of Joy Dickson MD for low bone density.    Earliest DXA scan performed in our system was in 2006.  This study showed low bone density, with lowest T score -2.0 at the left femoral neck.  The patient was treated with Fosamax at that time and plan was to continue treatment.  Fosamax treatment continued at 70 mg weekly; in 8/2011, dosage was reduced to 35 mg weekly due to decline in kidney function.    Most recent DXA scan performed on 9/20/2022 (I personally reviewed images) showed low bone density:  -L1-L4 T score 0.0, with 2.6% decline in BMD compared to 2019 (significant).  -Left femoral neck T score -2.0, left total hip T score -1.5.  -Right femoral neck T score -2.0, right total hip T score -1.3.  1.7% decline in BMD at the total mean hip compared to 2019 (not significant).    In 2/2012, the patient saw Dr. Chavarria in endocrinology.  At that time, endocrinology progress note mentions patient had been on Fosamax for 10 years.  The medication was discontinued at that time.  It does not appear that patient followed up in endocrinology thereafter.    Corrina recalls that she was on Fosamax but she is not certain that she was treated for 10 years.  She does not recall intolerance to the medication.  She does not recall other treatment for bone health.    Prior  fractures:  -C1 and C2 compression fracture in 4/2022, diagnosed after acute onset of neck pain. Was sitting on toilet and felt like she was going to pass out--fell (perhaps off toilet) the patient was hospitalized in the [facility] system.  -Right foot stress fracture in 6/2010. Had recurrent stress fracture in same foot and in the same bone within 6 months.    Endorses 2 inch height loss in adulthood.    The patient's medical history is also notable for high risk for diabetes, with A1c of 5.8% in 10/2022, GERD, hypertension, hyperlipidemia, mitral valve prolapse and obesity.    Calcium/vitamin D intake:  - Diet - Cottage cheese or cheese a few times a week.  Does not drink milk or milk alternatives.  Does not have dark leafy greens on a regular basis.  - Supplements - Fleming brand calcium citrate 1 tablet twice daily--2 tablets provide 500 mg calcium citrate, 800 IU vitamin D3.  - On PPI therapy  - Vitamin D3 2000 IU daily    Intermittent history of hypercalcemia, mild and noted in 2021 and 2022.  No history of nephrolithiasis.    No history of malabsorptive disorder; no history of GI surgery; no chronic diarrhea.    No history of long-term glucocorticoid therapy.  Approximate age at menopause: late 40s, briefly on HRT.    No tobacco use.  Rare alcohol use.  Walks regularly, also rides stationary bike.     No family history of osteoporosis.  No parental hip fracture history.    Has regular dentist appointments, every 6 months. Not anticipating invasive dental surgery.    Pertinent Social History: Lives in [place], [place].  , 2 children, 3 grandchildren, after children grew up worked as an . Now retired.    PAST MEDICAL HISTORY  Past Medical History:   Diagnosis Date     Bursitis of knee 2012    Left leg     Carpal tunnel syndrome 1970    Left wrist, no surgery     Elevated fasting glucose      Family history of breast cancer      GERD (gastroesophageal reflux disease)     EGD      HSIL (high grade  squamous intraepithelial lesion) on Pap smear of cervix 2006    s/p hyst      HTN (hypertension)      Hyperlipidemia LDL goal < 130 2002     Insomnia 1995     Left sided sciatica 2012     Migraine      Mitral (valve) prolapse      Obesity 4/16/2014     Osteopenia 2000     Psoriasis      Raynaud's disease      Tubular adenoma      Vitamin D deficiency 2008       MEDICATIONS  Current Outpatient Medications   Medication Sig Dispense Refill     amLODIPine (NORVASC) 10 MG tablet TAKE 1 TABLET DAILY 90 tablet 1     Calcium Carbonate-Vitamin D (CALCIUM 500 + D PO) One tab three time a day       clobetasol (TEMOVATE) 0.05 % external ointment Apply topically 2 times daily       Cyanocobalamin (VITAMIN B-12 PO) Take 500 mcg by mouth daily       HYDROcodone-acetaminophen (NORCO) 5-325 MG tablet Take 1 tablet by mouth every 6 hours as needed for severe pain 15 tablet 0     HYDROmorphone (DILAUDID) 2 MG tablet Take 1-2 tablets (2-4 mg) by mouth every 4 hours as needed for severe pain 60 tablet 0     hydrOXYzine (VISTARIL) 50 MG capsule Take 50 mg by mouth 3 times daily as needed       lisinopril (ZESTRIL) 10 MG tablet TAKE 1 TABLET DAILY 90 tablet 1     methocarbamol (ROBAXIN) 500 MG tablet Take 500 mg by mouth every 6 hours       MULTI-DAY OR daily       omeprazole (PRILOSEC) 20 MG DR capsule TAKE 1 CAPSULE EVERY DAY 90 capsule 3     pravastatin (PRAVACHOL) 40 MG tablet TAKE 1 TABLET EVERY DAY 90 tablet 3     sertraline (ZOLOFT) 100 MG tablet TAKE ONE AND ONE-HALF TABLETS ONCE DAILY 135 tablet 1     SUMAtriptan (IMITREX) 50 MG tablet TAKE 1 TABLET  AT ONSET OF HEADACHE FOR MIGRAINE MAY REPEAT IN 2 HOURS. MAX 4 TABLETS/24 HOURS. 27 tablet 1     tiZANidine (ZANAFLEX) 2 MG tablet Take 2-4 mg by mouth as needed       tiZANidine (ZANAFLEX) 2 MG tablet Take 1-2 tablets (2-4 mg) by mouth 3 times daily as needed for muscle spasms 30 tablet 0     triamcinolone (KENALOG) 0.1 % external cream Apply topically 2 times daily       VITAMIN D  1000 UNIT OR TABS 1 TABLET DAILY       zolpidem (AMBIEN) 5 MG tablet Take 1 tablet (5 mg) by mouth nightly as needed for sleep 90 tablet 1       Allergies, family, and social history were reviewed and documented as needed in EHR.     REVIEW OF SYSTEMS  A focused ROS was performed, with pertinent positives and negatives as noted in the HPI.    PHYSICAL EXAM  There were no vitals taken for this visit.  There is no height or weight on file to calculate BMI.  Constitutional: Patient is alert, oriented and appears in no acute distress.  Eyes: Eyes grossly normal to inspection, EOMI, no stare, lid lag, or retraction; no conjunctival injection.  ENMT: Lips are without lesions.   Neck: No visible goiter or neck mass.  Respiratory: No audible wheeze or cough. No visible cyanosis. No visible increased work of breathing.  Neurological: Alert and oriented times 3.  Cranial nerves grossly intact.      DATA REVIEW  Each of the following laboratory and/or imaging studies were reviewed.    Lab on 10/07/2022   Component Date Value Ref Range Status     Sodium 10/07/2022 141  133 - 144 mmol/L Final     Potassium 10/07/2022 4.1  3.4 - 5.3 mmol/L Final     Chloride 10/07/2022 108  94 - 109 mmol/L Final     Carbon Dioxide (CO2) 10/07/2022 27  20 - 32 mmol/L Final     Anion Gap 10/07/2022 6  3 - 14 mmol/L Final     Urea Nitrogen 10/07/2022 18  7 - 30 mg/dL Final     Creatinine 10/07/2022 0.89  0.52 - 1.04 mg/dL Final     Calcium 10/07/2022 10.1  8.5 - 10.1 mg/dL Final     Glucose 10/07/2022 123 (H)  70 - 99 mg/dL Final     GFR Estimate 10/07/2022 70  >60 mL/min/1.73m2 Final    Effective December 21, 2021 eGFRcr in adults is calculated using the 2021 CKD-EPI creatinine equation which includes age and gender (Julita et al., NEJM, DOI: 10.1056/HKYYob5443917)     Hemoglobin A1C 10/07/2022 5.8 (H)  0.0 - 5.6 % Final    Normal <5.7%   Prediabetes 5.7-6.4%    Diabetes 6.5% or higher     Note: Adopted from ADA consensus guidelines.          ASSESSMENT  1.  Low bone density.  With high FRAX calculated 10-year risk of fracture (18% for major osteoporotic fracture, 3.2% for hip fracture, accounting for prior fracture history).  Although she has had cervical spine and foot fractures, these would not technically qualify as osteoporotic fractures.  Given her significant height loss, would screen for an occult vertebral fracture since discovery of an occult thoracic or lumbar spine fracture would change her diagnosis to osteoporosis and may necessitate a change in medication options.  Would optimize calcium and vitamin D status and also screen for secondary causes of osteoporosis.  Once her work-up is complete, we can better consider options for pharmacologic therapy.  We discussed pathophysiology of low bone density, proposed testing for secondary causes as well as optimal calcium intake.    2.  Height loss.  Screen for an occult vertebral fracture, as discussed above.    3.  Hypercalcemia.  Mild and intermittent, captured on prior studies.  Recheck calcium along with PTH.  Given coexisting low bone density, would also screen for MGUS/multiple myeloma.    4.  History of abnormal thyroid function test.  Noted in 2012 and 2014, normal on follow-up testing last in 2017.  Check follow-up thyroid function test.    5.  High risk for diabetes.  Based on hemoglobin A1c.  Continue periodic monitoring in primary care.    PLAN  -Blood draw in the coming 1-2 weeks  -Spine x-rays in the coming few weeks  -Change current calcium citrate supplement to 2 tablets twice daily  -Continue vitamin D without changes  -2 weeks after making calcium supplement changes, please perform 24-hour urine studies  -Return for a follow-up visit in April 2023 (virtual visit)  -We will communicate results via [employer], or if needed by phone      Orders Placed This Encounter   Procedures     XR Thoracic Spine 2 Views     XR Lumbar Spine 2/3 Views     Vitamin D Deficiency     TSH with  free T4 reflex     Protein immunofixation urine     Protein electrophoresis random urine     Phosphorus     Parathyroid Hormone Intact     Comprehensive metabolic panel     CBC with platelets     Calcium timed urine     Creatinine timed urine     Sodium timed urine     Protein electrophoresis         Video-Visit Details    Type of service:  Video Visit, later telephone visit due to technical issues    Physician location: Offsite    Video Start Time: 9:28 AM  Video End Time: 9:39 AM    Telephone Start Time: 9:41 AM  Telephone End Time: 10:05 AM    I spent a total of 78 minutes on the date of encounter reviewing medical records, evaluating the patient, coordinating care and documenting in the EHR, as detailed above.      Platform used for Video Visit: Trudy Montaño MD   Division of Diabetes, Endocrinology and Metabolism  Department of Medicine      cc: Joy Dickson MD

## 2023-02-13 NOTE — PATIENT INSTRUCTIONS
-Blood draw in the coming 1-2 weeks  -Spine x-rays in the coming few weeks  -Change current calcium citrate supplement to 2 tablets twice daily  -Continue vitamin D without changes  -2 weeks after making calcium supplement changes, please perform 24-hour urine studies  -Return for a follow-up visit in April 2023 (virtual visit)  -We will communicate results via [employer], or if needed by phone

## 2023-02-13 NOTE — PROGRESS NOTES
ENDOCRINOLOGY VIDEO VISIT NEW        HISTORY OF PRESENT ILLNESS    Nannette Awad (prefers to be called Pat) is a 67 year old female who is being evaluated via a billable video visit.  Part of this visit was held by video, the remainder by phone due to difficulty with audio on [facility].    The patient is seen in consultation at the request of Joy Dickson MD for low bone density.    Earliest DXA scan performed in our system was in 2006.  This study showed low bone density, with lowest T score -2.0 at the left femoral neck.  The patient was treated with Fosamax at that time and plan was to continue treatment.  Fosamax treatment continued at 70 mg weekly; in 8/2011, dosage was reduced to 35 mg weekly due to decline in kidney function.    Most recent DXA scan performed on 9/20/2022 (I personally reviewed images) showed low bone density:  -L1-L4 T score 0.0, with 2.6% decline in BMD compared to 2019 (significant).  -Left femoral neck T score -2.0, left total hip T score -1.5.  -Right femoral neck T score -2.0, right total hip T score -1.3.  1.7% decline in BMD at the total mean hip compared to 2019 (not significant).    In 2/2012, the patient saw Dr. Chavarria in endocrinology.  At that time, endocrinology progress note mentions patient had been on Fosamax for 10 years.  The medication was discontinued at that time.  It does not appear that patient followed up in endocrinology thereafter.    Corrina recalls that she was on Fosamax but she is not certain that she was treated for 10 years.  She does not recall intolerance to the medication.  She does not recall other treatment for bone health.    Prior fractures:  -C1 and C2 compression fracture in 4/2022, diagnosed after acute onset of neck pain. Was sitting on toilet and felt like she was going to pass out--fell (perhaps off toilet) the patient was hospitalized in the [facility] system.  -Right foot stress fracture in 6/2010. Had recurrent stress fracture in same foot and in  the same bone within 6 months.    Endorses 2 inch height loss in adulthood.    The patient's medical history is also notable for high risk for diabetes, with A1c of 5.8% in 10/2022, GERD, hypertension, hyperlipidemia, mitral valve prolapse and obesity.    Calcium/vitamin D intake:  - Diet - Cottage cheese or cheese a few times a week.  Does not drink milk or milk alternatives.  Does not have dark leafy greens on a regular basis.  - Supplements - Fleming brand calcium citrate 1 tablet twice daily--2 tablets provide 500 mg calcium citrate, 800 IU vitamin D3.  - On PPI therapy  - Vitamin D3 2000 IU daily    Intermittent history of hypercalcemia, mild and noted in 2021 and 2022.  No history of nephrolithiasis.    No history of malabsorptive disorder; no history of GI surgery; no chronic diarrhea.    No history of long-term glucocorticoid therapy.  Approximate age at menopause: late 40s, briefly on HRT.    No tobacco use.  Rare alcohol use.  Walks regularly, also rides stationary bike.     No family history of osteoporosis.  No parental hip fracture history.    Has regular dentist appointments, every 6 months. Not anticipating invasive dental surgery.    Pertinent Social History: Lives in [place], [place].  , 2 children, 3 grandchildren, after children grew up worked as an . Now retired.    PAST MEDICAL HISTORY  Past Medical History:   Diagnosis Date     Bursitis of knee 2012    Left leg     Carpal tunnel syndrome 1970    Left wrist, no surgery     Elevated fasting glucose      Family history of breast cancer      GERD (gastroesophageal reflux disease)     EGD      HSIL (high grade squamous intraepithelial lesion) on Pap smear of cervix 2006    s/p hyst      HTN (hypertension)      Hyperlipidemia LDL goal < 130 2002     Insomnia 1995     Left sided sciatica 2012     Migraine      Mitral (valve) prolapse      Obesity 4/16/2014     Osteopenia 2000     Psoriasis      Raynaud's disease      Tubular adenoma       Vitamin D deficiency 2008       MEDICATIONS  Current Outpatient Medications   Medication Sig Dispense Refill     amLODIPine (NORVASC) 10 MG tablet TAKE 1 TABLET DAILY 90 tablet 1     Calcium Carbonate-Vitamin D (CALCIUM 500 + D PO) One tab three time a day       clobetasol (TEMOVATE) 0.05 % external ointment Apply topically 2 times daily       Cyanocobalamin (VITAMIN B-12 PO) Take 500 mcg by mouth daily       HYDROcodone-acetaminophen (NORCO) 5-325 MG tablet Take 1 tablet by mouth every 6 hours as needed for severe pain 15 tablet 0     HYDROmorphone (DILAUDID) 2 MG tablet Take 1-2 tablets (2-4 mg) by mouth every 4 hours as needed for severe pain 60 tablet 0     hydrOXYzine (VISTARIL) 50 MG capsule Take 50 mg by mouth 3 times daily as needed       lisinopril (ZESTRIL) 10 MG tablet TAKE 1 TABLET DAILY 90 tablet 1     methocarbamol (ROBAXIN) 500 MG tablet Take 500 mg by mouth every 6 hours       MULTI-DAY OR daily       omeprazole (PRILOSEC) 20 MG DR capsule TAKE 1 CAPSULE EVERY DAY 90 capsule 3     pravastatin (PRAVACHOL) 40 MG tablet TAKE 1 TABLET EVERY DAY 90 tablet 3     sertraline (ZOLOFT) 100 MG tablet TAKE ONE AND ONE-HALF TABLETS ONCE DAILY 135 tablet 1     SUMAtriptan (IMITREX) 50 MG tablet TAKE 1 TABLET  AT ONSET OF HEADACHE FOR MIGRAINE MAY REPEAT IN 2 HOURS. MAX 4 TABLETS/24 HOURS. 27 tablet 1     tiZANidine (ZANAFLEX) 2 MG tablet Take 2-4 mg by mouth as needed       tiZANidine (ZANAFLEX) 2 MG tablet Take 1-2 tablets (2-4 mg) by mouth 3 times daily as needed for muscle spasms 30 tablet 0     triamcinolone (KENALOG) 0.1 % external cream Apply topically 2 times daily       VITAMIN D 1000 UNIT OR TABS 1 TABLET DAILY       zolpidem (AMBIEN) 5 MG tablet Take 1 tablet (5 mg) by mouth nightly as needed for sleep 90 tablet 1       Allergies, family, and social history were reviewed and documented as needed in EHR.     REVIEW OF SYSTEMS  A focused ROS was performed, with pertinent positives and negatives as  noted in the HPI.    PHYSICAL EXAM  There were no vitals taken for this visit.  There is no height or weight on file to calculate BMI.  Constitutional: Patient is alert, oriented and appears in no acute distress.  Eyes: Eyes grossly normal to inspection, EOMI, no stare, lid lag, or retraction; no conjunctival injection.  ENMT: Lips are without lesions.   Neck: No visible goiter or neck mass.  Respiratory: No audible wheeze or cough. No visible cyanosis. No visible increased work of breathing.  Neurological: Alert and oriented times 3.  Cranial nerves grossly intact.      DATA REVIEW  Each of the following laboratory and/or imaging studies were reviewed.    Lab on 10/07/2022   Component Date Value Ref Range Status     Sodium 10/07/2022 141  133 - 144 mmol/L Final     Potassium 10/07/2022 4.1  3.4 - 5.3 mmol/L Final     Chloride 10/07/2022 108  94 - 109 mmol/L Final     Carbon Dioxide (CO2) 10/07/2022 27  20 - 32 mmol/L Final     Anion Gap 10/07/2022 6  3 - 14 mmol/L Final     Urea Nitrogen 10/07/2022 18  7 - 30 mg/dL Final     Creatinine 10/07/2022 0.89  0.52 - 1.04 mg/dL Final     Calcium 10/07/2022 10.1  8.5 - 10.1 mg/dL Final     Glucose 10/07/2022 123 (H)  70 - 99 mg/dL Final     GFR Estimate 10/07/2022 70  >60 mL/min/1.73m2 Final    Effective December 21, 2021 eGFRcr in adults is calculated using the 2021 CKD-EPI creatinine equation which includes age and gender (Julita et al., NEJ, DOI: 10.1056/ZCBJob7130741)     Hemoglobin A1C 10/07/2022 5.8 (H)  0.0 - 5.6 % Final    Normal <5.7%   Prediabetes 5.7-6.4%    Diabetes 6.5% or higher     Note: Adopted from ADA consensus guidelines.         ASSESSMENT  1.  Low bone density.  With high FRAX calculated 10-year risk of fracture (18% for major osteoporotic fracture, 3.2% for hip fracture, accounting for prior fracture history).  Although she has had cervical spine and foot fractures, these would not technically qualify as osteoporotic fractures.  Given her significant  height loss, would screen for an occult vertebral fracture since discovery of an occult thoracic or lumbar spine fracture would change her diagnosis to osteoporosis and may necessitate a change in medication options.  Would optimize calcium and vitamin D status and also screen for secondary causes of osteoporosis.  Once her work-up is complete, we can better consider options for pharmacologic therapy.  We discussed pathophysiology of low bone density, proposed testing for secondary causes as well as optimal calcium intake.    2.  Height loss.  Screen for an occult vertebral fracture, as discussed above.    3.  Hypercalcemia.  Mild and intermittent, captured on prior studies.  Recheck calcium along with PTH.  Given coexisting low bone density, would also screen for MGUS/multiple myeloma.    4.  History of abnormal thyroid function test.  Noted in 2012 and 2014, normal on follow-up testing last in 2017.  Check follow-up thyroid function test.    5.  High risk for diabetes.  Based on hemoglobin A1c.  Continue periodic monitoring in primary care.    PLAN  -Blood draw in the coming 1-2 weeks  -Spine x-rays in the coming few weeks  -Change current calcium citrate supplement to 2 tablets twice daily  -Continue vitamin D without changes  -2 weeks after making calcium supplement changes, please perform 24-hour urine studies  -Return for a follow-up visit in April 2023 (virtual visit)  -We will communicate results via [employer], or if needed by phone      Orders Placed This Encounter   Procedures     XR Thoracic Spine 2 Views     XR Lumbar Spine 2/3 Views     Vitamin D Deficiency     TSH with free T4 reflex     Protein immunofixation urine     Protein electrophoresis random urine     Phosphorus     Parathyroid Hormone Intact     Comprehensive metabolic panel     CBC with platelets     Calcium timed urine     Creatinine timed urine     Sodium timed urine     Protein electrophoresis         Video-Visit Details    Type of service:   Video Visit, later telephone visit due to technical issues    Physician location: Offsite    Video Start Time: 9:28 AM  Video End Time: 9:39 AM    Telephone Start Time: 9:41 AM  Telephone End Time: 10:05 AM    I spent a total of 78 minutes on the date of encounter reviewing medical records, evaluating the patient, coordinating care and documenting in the EHR, as detailed above.      Platform used for Video Visit: Trudy Montaño MD   Division of Diabetes, Endocrinology and Metabolism  Department of Medicine      cc: Joy Dickson MD

## 2023-03-03 ENCOUNTER — LAB (OUTPATIENT)
Dept: LAB | Facility: CLINIC | Age: 68
End: 2023-03-03
Payer: COMMERCIAL

## 2023-03-03 DIAGNOSIS — E53.8 VITAMIN B 12 DEFICIENCY: ICD-10-CM

## 2023-03-03 DIAGNOSIS — M85.9 LOW BONE DENSITY: ICD-10-CM

## 2023-03-03 DIAGNOSIS — R94.6 ABNORMAL FINDING ON THYROID FUNCTION TEST: ICD-10-CM

## 2023-03-03 DIAGNOSIS — E03.9 MYXEDEMA HEART DISEASE: Primary | ICD-10-CM

## 2023-03-03 DIAGNOSIS — M85.80 OSTEOPENIA WITH HIGH RISK OF FRACTURE: ICD-10-CM

## 2023-03-03 DIAGNOSIS — E83.52 HYPERCALCEMIA: ICD-10-CM

## 2023-03-03 DIAGNOSIS — I51.9 MYXEDEMA HEART DISEASE: Primary | ICD-10-CM

## 2023-03-03 LAB
ALBUMIN SERPL-MCNC: 3.9 G/DL (ref 3.4–5)
ALP SERPL-CCNC: 137 U/L (ref 40–150)
ALT SERPL W P-5'-P-CCNC: 41 U/L (ref 0–50)
ANION GAP SERPL CALCULATED.3IONS-SCNC: 6 MMOL/L (ref 3–14)
AST SERPL W P-5'-P-CCNC: 31 U/L (ref 0–45)
BILIRUB SERPL-MCNC: 0.8 MG/DL (ref 0.2–1.3)
BUN SERPL-MCNC: 16 MG/DL (ref 7–30)
CALCIUM SERPL-MCNC: 10.1 MG/DL (ref 8.5–10.1)
CHLORIDE BLD-SCNC: 106 MMOL/L (ref 94–109)
CO2 SERPL-SCNC: 27 MMOL/L (ref 20–32)
CREAT SERPL-MCNC: 0.73 MG/DL (ref 0.52–1.04)
ERYTHROCYTE [DISTWIDTH] IN BLOOD BY AUTOMATED COUNT: 14.7 % (ref 10–15)
GFR SERPL CREATININE-BSD FRML MDRD: 88 ML/MIN/1.73M2
GLUCOSE BLD-MCNC: 117 MG/DL (ref 70–99)
HCT VFR BLD AUTO: 43 % (ref 35–47)
HGB BLD-MCNC: 14.1 G/DL (ref 11.7–15.7)
MCH RBC QN AUTO: 28.8 PG (ref 26.5–33)
MCHC RBC AUTO-ENTMCNC: 32.8 G/DL (ref 31.5–36.5)
MCV RBC AUTO: 88 FL (ref 78–100)
PHOSPHATE SERPL-MCNC: 3.3 MG/DL (ref 2.5–4.5)
PLATELET # BLD AUTO: 250 10E3/UL (ref 150–450)
POTASSIUM BLD-SCNC: 3.9 MMOL/L (ref 3.4–5.3)
PROT SERPL-MCNC: 7.7 G/DL (ref 6.8–8.8)
PTH-INTACT SERPL-MCNC: 23 PG/ML (ref 15–65)
RBC # BLD AUTO: 4.89 10E6/UL (ref 3.8–5.2)
SODIUM SERPL-SCNC: 139 MMOL/L (ref 133–144)
T4 FREE SERPL-MCNC: 0.85 NG/DL (ref 0.76–1.46)
TOTAL PROTEIN SERUM FOR ELP: 7.1 G/DL (ref 6.4–8.3)
TSH SERPL DL<=0.005 MIU/L-ACNC: 1.72 MU/L (ref 0.4–4)
VIT B12 SERPL-MCNC: 1840 PG/ML (ref 232–1245)
WBC # BLD AUTO: 6.2 10E3/UL (ref 4–11)

## 2023-03-03 PROCEDURE — 85027 COMPLETE CBC AUTOMATED: CPT

## 2023-03-03 PROCEDURE — 84166 PROTEIN E-PHORESIS/URINE/CSF: CPT

## 2023-03-03 PROCEDURE — 82607 VITAMIN B-12: CPT | Performed by: PHYSICIAN ASSISTANT

## 2023-03-03 PROCEDURE — 84443 ASSAY THYROID STIM HORMONE: CPT | Performed by: PHYSICIAN ASSISTANT

## 2023-03-03 PROCEDURE — 84100 ASSAY OF PHOSPHORUS: CPT

## 2023-03-03 PROCEDURE — 84165 PROTEIN E-PHORESIS SERUM: CPT

## 2023-03-03 PROCEDURE — 82306 VITAMIN D 25 HYDROXY: CPT

## 2023-03-03 PROCEDURE — 80053 COMPREHEN METABOLIC PANEL: CPT

## 2023-03-03 PROCEDURE — 84439 ASSAY OF FREE THYROXINE: CPT | Performed by: PHYSICIAN ASSISTANT

## 2023-03-03 PROCEDURE — 83970 ASSAY OF PARATHORMONE: CPT

## 2023-03-03 PROCEDURE — 86335 IMMUNFIX E-PHORSIS/URINE/CSF: CPT

## 2023-03-03 PROCEDURE — 84155 ASSAY OF PROTEIN SERUM: CPT | Mod: 59

## 2023-03-03 PROCEDURE — 36415 COLL VENOUS BLD VENIPUNCTURE: CPT

## 2023-03-04 LAB — DEPRECATED CALCIDIOL+CALCIFEROL SERPL-MC: 48 UG/L (ref 20–75)

## 2023-03-06 LAB
ALBUMIN SERPL ELPH-MCNC: 4.3 G/DL (ref 3.7–5.1)
ALPHA1 GLOB SERPL ELPH-MCNC: 0.3 G/DL (ref 0.2–0.4)
ALPHA2 GLOB SERPL ELPH-MCNC: 0.7 G/DL (ref 0.5–0.9)
B-GLOBULIN SERPL ELPH-MCNC: 0.9 G/DL (ref 0.6–1)
GAMMA GLOB SERPL ELPH-MCNC: 0.9 G/DL (ref 0.7–1.6)
M PROTEIN SERPL ELPH-MCNC: 0 G/DL
PROT ELPH PNL UR ELPH: NORMAL
PROT PATTERN SERPL ELPH-IMP: NORMAL
PROT PATTERN UR ELPH-IMP: NORMAL

## 2023-03-09 ENCOUNTER — TRANSFERRED RECORDS (OUTPATIENT)
Dept: HEALTH INFORMATION MANAGEMENT | Facility: CLINIC | Age: 68
End: 2023-03-09
Payer: COMMERCIAL

## 2023-03-13 LAB
CALCIUM 24H UR-MRATE: 0.33 G/SPEC (ref 0.1–0.3)
CALCIUM UR-MCNC: 13.5 MG/DL
COLLECT DURATION TIME UR: 24 H
COLLECT DURATION TIME UR: 24 H
SODIUM 24H UR-SRATE: 247 MMOL/SPEC (ref 40–220)
SODIUM UR-SCNC: 101 MMOL/L
SPECIMEN VOL UR: 2450 ML
SPECIMEN VOL UR: 2450 ML

## 2023-03-13 PROCEDURE — 81050 URINALYSIS VOLUME MEASURE: CPT

## 2023-03-13 PROCEDURE — 82340 ASSAY OF CALCIUM IN URINE: CPT

## 2023-03-13 PROCEDURE — 82570 ASSAY OF URINE CREATININE: CPT

## 2023-03-13 PROCEDURE — 84300 ASSAY OF URINE SODIUM: CPT

## 2023-03-14 LAB
COLLECT DURATION TIME UR: 24 H
CREAT 24H UR-MRATE: 1.2 G/SPEC (ref 0.8–1.8)
CREAT UR-MCNC: 49 MG/DL
SPECIMEN VOL UR: 2450 ML

## 2023-03-24 ENCOUNTER — MYC MEDICAL ADVICE (OUTPATIENT)
Dept: ENDOCRINOLOGY | Facility: CLINIC | Age: 68
End: 2023-03-24
Payer: COMMERCIAL

## 2023-03-24 DIAGNOSIS — R82.994 HYPERCALCIURIA: Primary | ICD-10-CM

## 2023-03-27 ENCOUNTER — OFFICE VISIT (OUTPATIENT)
Dept: FAMILY MEDICINE | Facility: CLINIC | Age: 68
End: 2023-03-27
Payer: COMMERCIAL

## 2023-03-27 VITALS
BODY MASS INDEX: 29.09 KG/M2 | WEIGHT: 174.6 LBS | HEART RATE: 74 BPM | HEIGHT: 65 IN | TEMPERATURE: 98.4 F | OXYGEN SATURATION: 97 % | SYSTOLIC BLOOD PRESSURE: 152 MMHG | DIASTOLIC BLOOD PRESSURE: 60 MMHG | RESPIRATION RATE: 20 BRPM

## 2023-03-27 DIAGNOSIS — Z00.00 ENCOUNTER FOR MEDICARE ANNUAL WELLNESS EXAM: Primary | ICD-10-CM

## 2023-03-27 DIAGNOSIS — G47.00 INSOMNIA, UNSPECIFIED TYPE: ICD-10-CM

## 2023-03-27 DIAGNOSIS — I10 HYPERTENSION GOAL BP (BLOOD PRESSURE) < 150/90: ICD-10-CM

## 2023-03-27 DIAGNOSIS — K21.9 GASTROESOPHAGEAL REFLUX DISEASE WITHOUT ESOPHAGITIS: ICD-10-CM

## 2023-03-27 DIAGNOSIS — G43.909 MIGRAINE WITHOUT STATUS MIGRAINOSUS, NOT INTRACTABLE, UNSPECIFIED MIGRAINE TYPE: ICD-10-CM

## 2023-03-27 DIAGNOSIS — E78.5 HYPERLIPIDEMIA LDL GOAL <130: ICD-10-CM

## 2023-03-27 DIAGNOSIS — R73.03 PREDIABETES: ICD-10-CM

## 2023-03-27 DIAGNOSIS — R51.9 FACIAL PAIN: ICD-10-CM

## 2023-03-27 LAB
CHOLEST SERPL-MCNC: 233 MG/DL
FASTING STATUS PATIENT QL REPORTED: YES
HBA1C MFR BLD: 6.1 % (ref 0–5.6)
HDLC SERPL-MCNC: 88 MG/DL
LDLC SERPL CALC-MCNC: 120 MG/DL
NONHDLC SERPL-MCNC: 145 MG/DL
TRIGL SERPL-MCNC: 126 MG/DL

## 2023-03-27 PROCEDURE — 80061 LIPID PANEL: CPT | Performed by: FAMILY MEDICINE

## 2023-03-27 PROCEDURE — 99214 OFFICE O/P EST MOD 30 MIN: CPT | Mod: 25 | Performed by: FAMILY MEDICINE

## 2023-03-27 PROCEDURE — G0439 PPPS, SUBSEQ VISIT: HCPCS | Performed by: FAMILY MEDICINE

## 2023-03-27 PROCEDURE — 83036 HEMOGLOBIN GLYCOSYLATED A1C: CPT | Performed by: FAMILY MEDICINE

## 2023-03-27 PROCEDURE — 36415 COLL VENOUS BLD VENIPUNCTURE: CPT | Performed by: FAMILY MEDICINE

## 2023-03-27 RX ORDER — ASPIRIN 325 MG
325 TABLET ORAL
COMMUNITY
End: 2023-03-27

## 2023-03-27 RX ORDER — AMLODIPINE BESYLATE 10 MG/1
10 TABLET ORAL DAILY
Qty: 90 TABLET | Refills: 1 | Status: SHIPPED | OUTPATIENT
Start: 2023-03-27 | End: 2024-01-26

## 2023-03-27 RX ORDER — SERTRALINE HYDROCHLORIDE 100 MG/1
TABLET, FILM COATED ORAL
Qty: 45 TABLET | Refills: 0 | Status: SHIPPED | OUTPATIENT
Start: 2023-03-27 | End: 2023-11-29

## 2023-03-27 RX ORDER — PRAVASTATIN SODIUM 40 MG
TABLET ORAL
Qty: 90 TABLET | Refills: 3 | Status: SHIPPED | OUTPATIENT
Start: 2023-03-27 | End: 2024-01-31

## 2023-03-27 RX ORDER — SUMATRIPTAN 50 MG/1
TABLET, FILM COATED ORAL
Qty: 27 TABLET | Refills: 1 | Status: SHIPPED | OUTPATIENT
Start: 2023-03-27 | End: 2024-01-31

## 2023-03-27 RX ORDER — ZOLPIDEM TARTRATE 5 MG/1
5 TABLET ORAL
Qty: 90 TABLET | Refills: 1 | Status: SHIPPED | OUTPATIENT
Start: 2023-03-27 | End: 2024-01-31

## 2023-03-27 RX ORDER — LISINOPRIL 10 MG/1
10 TABLET ORAL DAILY
Qty: 90 TABLET | Refills: 1 | Status: SHIPPED | OUTPATIENT
Start: 2023-03-27 | End: 2024-01-26

## 2023-03-27 ASSESSMENT — ENCOUNTER SYMPTOMS
SHORTNESS OF BREATH: 0
HEMATURIA: 0
NERVOUS/ANXIOUS: 0
DIARRHEA: 0
HEADACHES: 1
FEVER: 0
DIZZINESS: 1
DYSURIA: 0
ARTHRALGIAS: 0
HEARTBURN: 1
COUGH: 0
WEAKNESS: 0
JOINT SWELLING: 0
FREQUENCY: 0
CHILLS: 0
CONSTIPATION: 1
NAUSEA: 0
SORE THROAT: 0
PARESTHESIAS: 0
HEMATOCHEZIA: 0
EYE PAIN: 0
BREAST MASS: 0
MYALGIAS: 0
PALPITATIONS: 0
ABDOMINAL PAIN: 0

## 2023-03-27 ASSESSMENT — PAIN SCALES - GENERAL: PAINLEVEL: MODERATE PAIN (4)

## 2023-03-27 ASSESSMENT — ACTIVITIES OF DAILY LIVING (ADL): CURRENT_FUNCTION: NO ASSISTANCE NEEDED

## 2023-03-27 NOTE — TELEPHONE ENCOUNTER
Pt responding to 3/14/23 MC:  Nonetheless, would you please confirm that your calcium citrate supplement (I have you is taking Fleming brand calcium citrate) is providing 500 mg of calcium twice daily.  If this is indeed the case, please update me so that we can make plans on how to best address loss of calcium into the urine.

## 2023-03-27 NOTE — PATIENT INSTRUCTIONS
Patient Education   Personalized Prevention Plan  You are due for the preventive services outlined below.  Your care team is available to assist you in scheduling these services.  If you have already completed any of these items, please share that information with your care team to update in your medical record.  Health Maintenance Due   Topic Date Due    ANNUAL REVIEW OF  ORDERS  Never done    Cholesterol Lab  03/23/2023    Annual Wellness Visit  03/23/2023        Consider asking about Topamax or Lyrica to replace gabapentin.    Wean sertraline as label instructs.

## 2023-03-27 NOTE — PROGRESS NOTES
"SUBJECTIVE:   Corrina is a 68 year old who presents for Preventive Visit.  Additional Questions 3/27/2023   Roomed by Carlos     Patient has been advised of split billing requirements and indicates understanding: Yes  Are you in the first 12 months of your Medicare coverage?  No    Healthy Habits:     In general, how would you rate your overall health?  Good    Frequency of exercise:  2-3 days/week    Duration of exercise:  Less than 15 minutes    Do you usually eat at least 4 servings of fruit and vegetables a day, include whole grains    & fiber and avoid regularly eating high fat or \"junk\" foods?  No    Taking medications regularly:  Yes    Medication side effects:  Other    Ability to successfully perform activities of daily living:  No assistance needed    Home Safety:  No safety concerns identified    Hearing Impairment:  Need to ask people to speak up or repeat themselves    In the past 6 months, have you been bothered by leaking of urine? Yes    In general, how would you rate your overall mental or emotional health?  Good      PHQ-2 Total Score: 0    Additional concerns today:  Yes      Have you ever done Advance Care Planning? (For example, a Health Directive, POLST, or a discussion with a medical provider or your loved ones about your wishes): has information from previous visit        Fall risk  Fallen 2 or more times in the past year?: No  Any fall with injury in the past year?: Yes    Cognitive Screening Normal cognition based on my direct observation during interview and exam.     Do you have sleep apnea, excessive snoring or daytime drowsiness?: day time drowsiness- possibly related to gabapebtin     Reviewed and updated as needed this visit by clinical staff   Tobacco  Allergies  Meds  Problems  Med Hx  Surg Hx  Fam Hx          Reviewed and updated as needed this visit by Provider   Tobacco  Allergies  Meds  Problems  Med Hx  Surg Hx  Fam Hx         Social History     Tobacco Use     Smoking " status: Never     Smokeless tobacco: Never   Substance Use Topics     Alcohol use: No         Alcohol Use 3/27/2023   Prescreen: >3 drinks/day or >7 drinks/week? Not Applicable     Do you have a current opioid prescription? No  Do you use any other controlled substances or medications that are not prescribed by a provider? None              Current providers sharing in care for this patient include:   Patient Care Team:  Joy Dickson MD as PCP - General (Family Practice)  Joy Dickson MD as Assigned PCP  Marco Montaño MD as Assigned Endocrinology Provider    G 3 P 2   No LMP recorded. Patient has had a hysterectomy.     Fasting: Yes    Td: tdap 3/2020       Flu: 10/2022      Covid: biv 10/7/2022      Shingrix: done      PPV: done      NO - age 63 - see link Cervical Cytology Screening Guidelines  Status post benign hysterectomy. Health Maintenance and Surgical History updated.               Cholesterol:   Lab Results   Component Value Date    CHOL 234 03/23/2022    CHOL 248 03/04/2021     Lab Results   Component Value Date    HDL 95 03/23/2022    HDL 83 03/04/2021     Lab Results   Component Value Date     03/23/2022     03/04/2021     Lab Results   Component Value Date    TRIG 137 03/23/2022    TRIG 220 03/04/2021     Lab Results   Component Value Date    CHOLHDLRATIO 2.7 02/02/2015         MMG: 3/2023  Dexa:  9/2022     Flex/colo: 11/2020 q3y CG      Seat Belt: Yes    Sunscreen use: Yes   Calcium Intake: adeq  Health Care Directive: No  Sexually Active: Yes     Current contraception: hysterectomy  History of abnormal Pap smear: Yes: see [facility]  Family history of colon/breast/ovarian cancer: Yes: see [facility]  Regular self breast exam: Yes  History of abnormal mammogram: Yes: see reports        The following health maintenance items are reviewed in Epic and correct as of today:  Health Maintenance   Topic Date Due     ANNUAL REVIEW OF HM ORDERS  Never done     LIPID  03/23/2023     BMP   09/03/2023     A1C  09/27/2023     MAMMO SCREENING  03/09/2024     MEDICARE ANNUAL WELLNESS VISIT  03/27/2024     FALL RISK ASSESSMENT  03/27/2024     COLORECTAL CANCER SCREENING  04/08/2024     ADVANCE CARE PLANNING  03/23/2027     DEXA  09/20/2027     DTAP/TDAP/TD IMMUNIZATION (4 - Td or Tdap) 03/02/2030     HEPATITIS C SCREENING  Completed     MIGRAINE ACTION PLAN  Completed     PHQ-2 (once per calendar year)  Completed     INFLUENZA VACCINE  Completed     Pneumococcal Vaccine: 65+ Years  Completed     ZOSTER IMMUNIZATION  Completed     COVID-19 Vaccine  Completed     IPV IMMUNIZATION  Aged Out     MENINGITIS IMMUNIZATION  Aged Out     BP Readings from Last 3 Encounters:   03/27/23 (!) 152/60   10/26/22 124/74   05/04/22 136/70    Wt Readings from Last 3 Encounters:   03/27/23 79.2 kg (174 lb 9.6 oz)   05/04/22 78.5 kg (173 lb)   04/20/22 80.7 kg (178 lb)                  Patient Active Problem List   Diagnosis     Hyperlipidemia LDL goal <130     Hypertension goal BP (blood pressure) < 150/90     Advanced directives, counseling/discussion     Osteopenia     GERD (gastroesophageal reflux disease)     Family history of breast cancer     Migraine without status migrainosus, not intractable, unspecified migraine type     Alopecia     Elevated fasting glucose     Vitamin D deficiency     Closed nondisplaced fracture of second cervical vertebra with routine healing     Closed displaced lateral mass fracture of first cervical vertebra with routine healing     Past Surgical History:   Procedure Laterality Date     BREAST BIOPSY, CORE RT/LT  1990     COLONOSCOPY       ESOPHAGOSCOPY, GASTROSCOPY, DUODENOSCOPY (EGD), COMBINED  9/17/2012    Procedure: COMBINED ESOPHAGOSCOPY, GASTROSCOPY, DUODENOSCOPY (EGD), BIOPSY SINGLE OR MULTIPLE;  EGD-GERD;  Surgeon: Teo Alvarado MD;  Location: [facility] OR     HYSTERECTOMY, PAP NO LONGER INDICATED  09/22/2006    vaginal hyst with bilateral sal-ooph, HSIL     LIGATN/STRIP LONG OR  SHORT SAPHEN  11/09    right leg     SURGICAL HISTORY OF -   2006    conization for abnl PAP       Social History     Tobacco Use     Smoking status: Never     Smokeless tobacco: Never   Substance Use Topics     Alcohol use: No     Family History   Problem Relation Age of Onset     Heart Disease Mother         CHF     Hypertension Mother      Lipids Mother      Neurologic Disorder Mother 30        migraines     Alzheimer Disease Mother 67     Cancer Maternal Grandmother      Hypertension Brother      Cerebrovascular Disease Brother      Hypertension Brother      Thyroid Disease Paternal Grandmother      Breast Cancer Paternal Grandmother      Cancer Father 76        Pancreatic ca age 76     Other Cancer Father         pancreatic     Prostate Cancer Father      Breast Cancer Maternal Aunt          Current Outpatient Medications   Medication Sig Dispense Refill     amLODIPine (NORVASC) 10 MG tablet Take 1 tablet (10 mg) by mouth daily 90 tablet 1     Calcium Carbonate-Vitamin D (CALCIUM 500 + D PO) Take 500 mg by mouth 2 times daily 2 tablets bid       clobetasol (TEMOVATE) 0.05 % external ointment Apply topically 2 times daily       Cyanocobalamin (VITAMIN B-12 PO) Take 500 mcg by mouth daily       gabapentin (NEURONTIN) 300 MG capsule 1,200 mg 2 times daily       lisinopril (ZESTRIL) 10 MG tablet Take 1 tablet (10 mg) by mouth daily 90 tablet 1     MULTI-DAY OR daily       omeprazole (PRILOSEC) 20 MG DR capsule TAKE 1 CAPSULE EVERY DAY 90 capsule 3     pravastatin (PRAVACHOL) 40 MG tablet TAKE 1 TABLET EVERY DAY 90 tablet 3     sertraline (ZOLOFT) 100 MG tablet Take 1 tablet (100 mg) by mouth daily for 30 days, THEN 0.5 tablets (50 mg) daily for 30 days. 45 tablet 0     SUMAtriptan (IMITREX) 50 MG tablet TAKE 1 TABLET  AT ONSET OF HEADACHE FOR MIGRAINE MAY REPEAT IN 2 HOURS. MAX 4 TABLETS/24 HOURS. 27 tablet 1     VITAMIN D 1000 UNIT OR TABS 1 TABLET DAILY       zolpidem (AMBIEN) 5 MG tablet Take 1 tablet (5 mg) by  mouth nightly as needed for sleep 90 tablet 1     Recent Labs   Lab Test 03/27/23  1010 03/03/23  1039 10/07/22  1308 03/23/22  1209 09/15/21  1047 03/04/21  1100 11/11/20  0820 02/23/20  0911 04/09/18  1047 10/09/17  0928 02/15/16  1018 02/02/15  0946   A1C 6.1*  --  5.8* 6.0*   < >  --  6.0* 6.0*   < > 6.2*   < >  --    LDL  --   --   --  112*  --  121*  --  136*   < >  --    < > 123   HDL  --   --   --  95  --  83  --  87   < >  --    < > 87   TRIG  --   --   --  137  --  220*  --  104   < >  --    < > 132   ALT  --  41  --   --   --   --   --   --   --   --   --  40   CR  --  0.73 0.89 0.83   < > 0.82 0.82 0.81   < >  --    < > 0.84   GFRESTIMATED  --  88 70 76   < > 73 73 75   < >  --    < > 69   GFRESTBLACK  --   --   --   --   --  85 85 87   < >  --    < > 83   POTASSIUM  --  3.9 4.1 4.4   < > 3.9 3.9 4.8   < >  --    < > 4.3   TSH  --  1.72  --   --   --   --   --   --   --  1.23   < > 1.00    < > = values in this interval not displayed.              Review of Systems   Constitutional: Negative for chills and fever.   HENT: Positive for hearing loss. Negative for congestion, ear pain and sore throat.    Eyes: Positive for visual disturbance. Negative for pain.   Respiratory: Negative for cough and shortness of breath.    Cardiovascular: Negative for chest pain, palpitations and peripheral edema.   Gastrointestinal: Positive for constipation and heartburn. Negative for abdominal pain, diarrhea, hematochezia and nausea.   Breasts:  Negative for tenderness, breast mass and discharge.   Genitourinary: Positive for urgency. Negative for dysuria, frequency, genital sores, hematuria, pelvic pain, vaginal bleeding and vaginal discharge.   Musculoskeletal: Negative for arthralgias, joint swelling and myalgias.   Skin: Negative for rash.   Neurological: Positive for dizziness and headaches. Negative for weakness and paresthesias.   Psychiatric/Behavioral: Negative for mood changes. The patient is not nervous/anxious.   "        OBJECTIVE:   BP (!) 152/60   Pulse 74   Temp 98.4  F (36.9  C) (Oral)   Resp 20   Ht 1.638 m (5' 4.5\")   Wt 79.2 kg (174 lb 9.6 oz)   SpO2 97%   BMI 29.51 kg/m   Estimated body mass index is 29.51 kg/m  as calculated from the following:    Height as of this encounter: 1.638 m (5' 4.5\").    Weight as of this encounter: 79.2 kg (174 lb 9.6 oz).  Physical Exam  GENERAL APPEARANCE: healthy, alert and no distress  EYES: Eyes grossly normal to inspection, PERRL and conjunctivae and sclerae normal  HENT: ear canals and TM's normal, nose and mouth without ulcers or lesions, oropharynx clear and oral mucous membranes moist  NECK: no adenopathy, no asymmetry, masses, or scars and thyroid normal to palpation  RESP: lungs clear to auscultation - no rales, rhonchi or wheezes  BREAST: normal without masses, tenderness or nipple discharge and no palpable axillary masses or adenopathy  CV: regular rate and rhythm, normal S1 S2, no S3 or S4, no murmur, click or rub, no peripheral edema and peripheral pulses strong  ABDOMEN: soft, nontender, no hepatosplenomegaly, no masses and bowel sounds normal  MS: no musculoskeletal defects are noted and gait is age appropriate without ataxia  SKIN: no suspicious lesions or rashes  NEURO: Normal strength and tone, sensory exam grossly normal, mentation intact and speech normal  PSYCH: mentation appears normal and affect normal/bright    Diagnostic Test Results:  Labs reviewed in Epic    ASSESSMENT / PLAN:   (Z00.00) Encounter for Medicare annual wellness exam  (primary encounter diagnosis)  Comment: preventive needs reviewed   Plan: Lipid panel reflex to direct LDL Non-fasting        [facility].   Due for Cologuard in 11/2023    (R73.03) Prediabetes  Comment: a1c stable  Plan: HEMOGLOBIN A1C, OFFICE/OUTPT VISIT,ESTLEVL IV        Increase activity, increase fruits/veggies    (I10) Hypertension goal BP (blood pressure) < 150/90  Comment: not to goal, h/o white coat  Plan: " "amLODIPine (NORVASC) 10 MG tablet, lisinopril         (ZESTRIL) 10 MG tablet, OFFICE/OUTPT         VISIT,EST,LEVL IV        Check on home validated cuff and send in readings.   Refill x 6 months f/u 6 months for labs     (K21.9) Gastroesophageal reflux disease without esophagitis  Comment: controlled  Plan: omeprazole (PRILOSEC) 20 MG DR capsule,         OFFICE/OUTPT VISIT,EST,LEVL IV        Refill x 1 yr     (E78.5) Hyperlipidemia LDL goal <130  Comment: labs today  Plan: pravastatin (PRAVACHOL) 40 MG tablet,         OFFICE/OUTPT VISIT,EST,LEVL IV        Refill x 1 yr    (R51.9) Facial pain  Comment: not improved on sertraline, worsened after her fall/neck fx, using gabapentin per neurosurgery  Plan: sertraline (ZOLOFT) 100 MG tablet, OFFICE/OUTPT        VISIT,EST,LEVL IV        Would like to wean off.     (G43.909) Migraine without status migrainosus, not intractable, unspecified migraine type  Comment: stable  Plan: SUMAtriptan (IMITREX) 50 MG tablet,         OFFICE/OUTPT VISIT,EST,LEVL IV        Refill x 1 yr     (G47.00) Insomnia, unspecified type  Comment: occas use  Plan: zolpidem (AMBIEN) 5 MG tablet, OFFICE/OUTPT         VISIT,EST,LEVL IV         Refill x 6 months             COUNSELING:  Reviewed preventive health counseling, as reflected in patient instructions  Special attention given to:       Regular exercise       Healthy diet/nutrition      BMI:   Estimated body mass index is 29.51 kg/m  as calculated from the following:    Height as of this encounter: 1.638 m (5' 4.5\").    Weight as of this encounter: 79.2 kg (174 lb 9.6 oz).         She reports that she has never smoked. She has never used smokeless tobacco.      Appropriate preventive services were discussed with this patient, including applicable screening as appropriate for cardiovascular disease, diabetes, osteopenia/osteoporosis, and glaucoma.  As appropriate for age/gender, discussed screening for colorectal cancer, prostate cancer, breast " cancer, and cervical cancer. Checklist reviewing preventive services available has been given to the patient.    Reviewed patients plan of care and provided an AVS. The Intermediate Care Plan ( asthma action plan, low back pain action plan, and migraine action plan) for Nannette meets the Care Plan requirement. This Care Plan has been established and reviewed with the Patient.          Joy Dickson MD  [facility]    Identified Health Risks:    I have reviewed Opioid Use Disorder and Substance Use Disorder risk factors and made any needed referrals.

## 2023-03-31 ENCOUNTER — MYC MEDICAL ADVICE (OUTPATIENT)
Dept: FAMILY MEDICINE | Facility: CLINIC | Age: 68
End: 2023-03-31
Payer: COMMERCIAL

## 2023-03-31 NOTE — TELEPHONE ENCOUNTER
Routing to provider, see [employer] message from patient below.  Writer updated Gabapentin dose on medication list to reflect what is currently taking.  Provider to review BP reading.      Millie Ham RN  Clinical Triage/Primary Care  [facility]

## 2023-04-11 ENCOUNTER — ALLIED HEALTH/NURSE VISIT (OUTPATIENT)
Dept: FAMILY MEDICINE | Facility: CLINIC | Age: 68
End: 2023-04-11
Payer: COMMERCIAL

## 2023-04-11 VITALS — DIASTOLIC BLOOD PRESSURE: 64 MMHG | SYSTOLIC BLOOD PRESSURE: 138 MMHG | HEART RATE: 68 BPM

## 2023-04-11 DIAGNOSIS — Z01.30 BLOOD PRESSURE CHECK: Primary | ICD-10-CM

## 2023-04-11 PROCEDURE — 99207 PR NO CHARGE NURSE ONLY: CPT | Performed by: FAMILY MEDICINE

## 2023-04-11 NOTE — PROGRESS NOTES
Nannette Awad was evaluated at [facility] on April 11, 2023 at which time her blood pressure was:    BP Readings from Last 1 Encounters:   04/11/23 138/64     No data recorded      Reviewed lifestyle modifications for blood pressure control and reduction: including making healthy food choices, managing weight, getting regular exercise, smoking cessation, reducing alcohol consumption, monitoring blood pressure regularly.     Symptoms: None    BP Goal:< 140/90 mmHg    BP Assessment:  BP at goal    Potential Reasons for BP too high: NA - Not applicable    BP Follow-Up Plan: Recheck BP in 6 months at pharmacy    Recommendation to Provider: n/a    Note completed by: Estrella Liao RPH

## 2023-04-28 ENCOUNTER — ANCILLARY PROCEDURE (OUTPATIENT)
Dept: GENERAL RADIOLOGY | Facility: CLINIC | Age: 68
End: 2023-04-28
Attending: INTERNAL MEDICINE
Payer: COMMERCIAL

## 2023-04-28 DIAGNOSIS — M85.9 LOW BONE DENSITY: ICD-10-CM

## 2023-04-28 DIAGNOSIS — M85.80 OSTEOPENIA WITH HIGH RISK OF FRACTURE: ICD-10-CM

## 2023-04-28 LAB
COLLECT DURATION TIME UR: 24 H
CREAT 24H UR-MRATE: 0.79 G/SPEC (ref 0.8–1.8)
CREAT UR-MCNC: 44 MG/DL
SPECIMEN VOL UR: 1800 ML

## 2023-04-28 PROCEDURE — 82340 ASSAY OF CALCIUM IN URINE: CPT | Mod: 90 | Performed by: PATHOLOGY

## 2023-04-28 PROCEDURE — 72100 X-RAY EXAM L-S SPINE 2/3 VWS: CPT | Mod: TC | Performed by: RADIOLOGY

## 2023-04-28 PROCEDURE — 81050 URINALYSIS VOLUME MEASURE: CPT | Mod: 90 | Performed by: PATHOLOGY

## 2023-04-28 PROCEDURE — 82570 ASSAY OF URINE CREATININE: CPT | Mod: VID | Performed by: PATHOLOGY

## 2023-04-28 PROCEDURE — 72070 X-RAY EXAM THORAC SPINE 2VWS: CPT | Mod: TC | Performed by: RADIOLOGY

## 2023-04-28 PROCEDURE — 99000 SPECIMEN HANDLING OFFICE-LAB: CPT | Mod: VID | Performed by: PATHOLOGY

## 2023-04-28 PROCEDURE — 84300 ASSAY OF URINE SODIUM: CPT | Mod: 90 | Performed by: PATHOLOGY

## 2023-04-28 PROCEDURE — 81050 URINALYSIS VOLUME MEASURE: CPT | Mod: VID | Performed by: PATHOLOGY

## 2023-04-29 LAB
CALCIUM 24H UR-MRATE: 0.24 G/SPEC (ref 0.1–0.3)
CALCIUM UR-MCNC: 13.2 MG/DL
COLLECT DURATION TIME UR: 24 H
COLLECT DURATION TIME UR: 24 H
SODIUM 24H UR-SRATE: 158 MMOL/SPEC (ref 40–220)
SODIUM UR-SCNC: 88 MMOL/L
SPECIMEN VOL UR: 1800 ML
SPECIMEN VOL UR: 1800 ML

## 2023-05-07 ASSESSMENT — ENCOUNTER SYMPTOMS
TREMORS: 0
HEADACHES: 1
SEIZURES: 0
WEAKNESS: 0
LOSS OF CONSCIOUSNESS: 0
DIZZINESS: 1
MEMORY LOSS: 0
TINGLING: 0
SPEECH CHANGE: 0
NUMBNESS: 0
DISTURBANCES IN COORDINATION: 0
PARALYSIS: 0

## 2023-05-08 ENCOUNTER — VIRTUAL VISIT (OUTPATIENT)
Dept: ENDOCRINOLOGY | Facility: CLINIC | Age: 68
End: 2023-05-08
Payer: COMMERCIAL

## 2023-05-08 DIAGNOSIS — R82.994 HYPERCALCIURIA: Primary | ICD-10-CM

## 2023-05-08 DIAGNOSIS — M85.9 LOW BONE DENSITY: ICD-10-CM

## 2023-05-08 PROCEDURE — 99215 OFFICE O/P EST HI 40 MIN: CPT | Mod: VID | Performed by: INTERNAL MEDICINE

## 2023-05-08 RX ORDER — ALENDRONATE SODIUM 70 MG/1
70 TABLET ORAL
Qty: 12 TABLET | Refills: 3 | Status: SHIPPED | OUTPATIENT
Start: 2023-05-08 | End: 2024-01-31

## 2023-05-08 NOTE — LETTER
5/8/2023       RE: Nannette Awad  [address]  [facility] [address]     Dear Colleague,    Thank you for referring your patient, Nannette Awad, to the [facility] ENDOCRINOLOG[facility] [place] [facility]. Please see a copy of my visit note below.      ENDOCRINOLOGY VIDEO FOLLOW-UP        HISTORY OF PRESENT ILLNESS    Nannette Awad (prefers to be called [facility]) is seen in follow-up via billable video visit.    No falls or fractures since last visit.    Corrina has adjusted her calcium supplement as we discussed at her initial visit: Taking 2 tablets twice daily of Fleming brand calcium citrate (2 tablets provide 500 mg calcium citrate, 800 IU vitamin D3).    Does not have much dietary calcium intake.    Also taking separate vitamin D3 2000 IU daily.    She notes that she did not make a significant change in her diet after we discovered hypercalciuria and advised low-sodium diet: She already follows a relatively lower sodium diet.    She has GERD which is managed with PPI: No active symptoms at present.    No dysphagia.    She is not anticipating invasive oral surgery.    She confirms that she completed a full 24 urine collection on most recent 24 urine studies.    Pertinent endocrine and related history:  1.  Low bone density.  -Earliest DXA scan performed in our system was in 2006.  This study showed low bone density, with lowest T score -2.0 at the left femoral neck.  The patient was treated with Fosamax at that time and plan was to continue treatment.  Fosamax treatment continued at 70 mg weekly; in 8/2011, dosage was reduced to 35 mg weekly due to decline in kidney function.  -Most recent DXA scan performed on 9/20/2022 showed low bone density:  L1-L4 T score 0.0, with 2.6% decline in BMD compared to 2019 (significant).  Left femoral neck T score -2.0, left total hip T score -1.5.  Right femoral neck T score -2.0, right total hip T score -1.3.  1.7%  decline in BMD at the total mean hip compared to 2019 (not significant).  -In 2/2012, the patient saw Dr. Chavarria in endocrinology.  At that time, endocrinology progress note mentions patient had been on Fosamax for 10 years.  The medication was discontinued at that time.  It does not appear that patient followed up in endocrinology thereafter.  -Prior fractures:  C1 and C2 compression fracture in 4/2022, diagnosed after acute onset of neck pain. Was sitting on toilet and felt like she was going to pass out--fell (perhaps off toilet) the patient was hospitalized in the [facility] system.  Right foot stress fracture in 6/2010. Had recurrent stress fracture in same foot and in the same bone within 6 months.  2.  History of intermittent hypercalcemia.  Mild, noted in 2021 in 2022.  -No history of nephrolithiasis.  3.  High risk for diabetes.    Other pertinent medical history includes GERD, hypertension, hyperlipidemia, mitral valve prolapse and obesity.    Pertinent Social History: Lives in [place], [place].  , 2 children, 3 grandchildren, after children grew up worked as an . Now retired.    PAST MEDICAL HISTORY  Past Medical History:   Diagnosis Date    Bursitis of knee 2012    Left leg    Carpal tunnel syndrome 1970    Left wrist, no surgery    Elevated fasting glucose     Family history of breast cancer     GERD (gastroesophageal reflux disease)     EGD     HSIL (high grade squamous intraepithelial lesion) on Pap smear of cervix 2006    s/p hyst     HTN (hypertension)     Hyperlipidemia LDL goal < 130 2002    Insomnia 1995    Left sided sciatica 2012    Migraine     Mitral (valve) prolapse     Obesity 4/16/2014    Osteopenia 2000    Psoriasis     Raynaud's disease     Tubular adenoma     Vitamin D deficiency 2008       MEDICATIONS  Current Outpatient Medications   Medication Sig Dispense Refill    amLODIPine (NORVASC) 10 MG tablet Take 1 tablet (10 mg) by mouth daily 90 tablet 1    Calcium  Carbonate-Vitamin D (CALCIUM 500 + D PO) Take 500 mg by mouth 2 times daily 2 tablets bid      clobetasol (TEMOVATE) 0.05 % external ointment Apply topically 2 times daily      Cyanocobalamin (VITAMIN B-12 PO) Take 500 mcg by mouth daily      gabapentin (NEURONTIN) 300 MG capsule Take 600 mg by mouth 2 times daily      lisinopril (ZESTRIL) 10 MG tablet Take 1 tablet (10 mg) by mouth daily 90 tablet 1    MULTI-DAY OR daily      omeprazole (PRILOSEC) 20 MG DR capsule TAKE 1 CAPSULE EVERY DAY 90 capsule 3    pravastatin (PRAVACHOL) 40 MG tablet TAKE 1 TABLET EVERY DAY 90 tablet 3    sertraline (ZOLOFT) 100 MG tablet Take 1 tablet (100 mg) by mouth daily for 30 days, THEN 0.5 tablets (50 mg) daily for 30 days. 45 tablet 0    SUMAtriptan (IMITREX) 50 MG tablet TAKE 1 TABLET  AT ONSET OF HEADACHE FOR MIGRAINE MAY REPEAT IN 2 HOURS. MAX 4 TABLETS/24 HOURS. 27 tablet 1    VITAMIN D 1000 UNIT OR TABS 1 TABLET DAILY      zolpidem (AMBIEN) 5 MG tablet Take 1 tablet (5 mg) by mouth nightly as needed for sleep 90 tablet 1       Allergies, family, and social history were reviewed and documented as needed in EHR.     REVIEW OF SYSTEMS  A focused ROS was performed, with pertinent positives and negatives as noted in the HPI.    PHYSICAL EXAM  There were no vitals taken for this visit.  There is no height or weight on file to calculate BMI.  Constitutional: Patient is alert, oriented and appears in no acute distress.  Eyes: Eyes grossly normal to inspection, EOMI, no stare, lid lag, or retraction; no conjunctival injection.  ENMT: Lips are without lesions.   Neck: No visible goiter or neck mass.  Respiratory: No audible wheeze or cough. No visible cyanosis. No visible increased work of breathing.  Neurological: Alert and oriented times 3.  Cranial nerves grossly intact.      DATA REVIEW  Each of the following laboratory and/or imaging studies were reviewed.    Component      Latest Ref Rng 3/13/2023  1:44 PM 3/13/2023  1:46 PM  4/28/2023  10:36 AM   Calcium Urine mg/dL      mg/dL 13.5   13.2    Duration in hours      h 24.0  24.0  24.0    Duration in hours       24.0   24.0    Volume in mL      mL 2,450  2,450  1,800    Volume in mL       2,450   1,800    Calcium Urine g/24 h      0.10 - 0.30 g/spec 0.33 (H)   0.24    Creatinine Urine      mg/dL  49     Creatinine Urine Timed      0.80 - 1.80 g/spec  1.20     Sodium Urine mmol/L      mmol/L 101   88    Sodium Urine mmol/24 h      40 - 220 mmol/spec 247 (H)   158      Component      Latest Ref [facility] 4/28/2023  11:08 AM   Calcium Urine mg/dL      mg/dL    Duration in hours      h 24.0    Volume in mL      mL 1,800    Calcium Urine g/24 h      0.10 - 0.30 g/spec    Creatinine Urine      mg/dL 44    Creatinine Urine Timed      0.80 - 1.80 g/spec 0.79 (L)    Sodium Urine mmol/L      mmol/L    Sodium Urine mmol/24 h      40 - 220 mmol/spec       Legend:  (H) High  (L) Low      Component      Latest Ref [facility] 3/3/2023  10:39 AM   RBC Count      3.80 - 5.20 10e6/uL 4.89    Hemoglobin      11.7 - 15.7 g/dL 14.1    Hematocrit      35.0 - 47.0 % 43.0    MCV      78 - 100 fL 88    MCH      26.5 - 33.0 pg 28.8    MCHC      31.5 - 36.5 g/dL 32.8    RDW      10.0 - 15.0 % 14.7    Platelet Count      150 - 450 10e3/uL 250    Albumin Fraction      3.7 - 5.1 g/dL 4.3    Alpha 1 Fraction      0.2 - 0.4 g/dL 0.3    Alpha 2 Fraction      0.5 - 0.9 g/dL 0.7    Beta Fraction      0.6 - 1.0 g/dL 0.9    Gamma Fraction      0.7 - 1.6 g/dL 0.9    Monoclonal Peak      <=0.0 g/dL 0.0    ELP Interpretation: Essentially normal electrophoretic pattern. No obvious monoclonal proteins seen. Pathologic significance requires clinical correlation. Bryanna Wu M.D., Ph.D.        Component      Latest Ref [facility] 3/3/2023  10:39 AM   Vitamin D Deficiency screening      20 - 75 ug/L 48    Immunofix ELP Urine No monoclonal protein seen on immunofixation. Pathologic significance requires clinical correlation. Bryanna Wu M.D., Ph.D.     ELP Interpretation Urine Only trace albumin and trace globulins. No obvious monoclonal protein seen and the absence of a monoclonal immunoglobulins was confirmed by immunofixation of this same urine sample. Pathologic significance requires clinical correlation. Bryanna Wu M.D., Ph.D.    Phosphorus      2.5 - 4.5 mg/dL 3.3    Parathyroid Hormone Intact      15 - 65 pg/mL 23    Total Protein Serum for ELP      6.4 - 8.3 g/dL 7.1          ASSESSMENT  1.  Low bone density.  With high FRAX calculated 10-year risk of fracture (18% for major osteoporotic fracture, 3.2% for hip fracture, accounting for prior fracture history).  Although she has had cervical spine and foot fractures, these would not technically qualify as osteoporotic fractures.  We screened for occult vertebral fractures in the thoracic and lumbar spine, no fractures were noted on x-rays.  Our work-up for secondary causes of low bone density revealed mild hypercalciuria (as discussed below) otherwise normal screening for multiple myeloma, normal PTH, normal CBC and thyroid function test.  Given these findings, would proceed with pharmacologic therapy: Recommend alendronate.  Although she has reflux, it is well managed with current regimen.  We discussed how best to take alendronate to maximize its absorption's.  We reviewed benefits and potential side effects of this medication, including potential for rare side effects such as ONJ and AFF. I have asked Pat to update me if she notices recurrence of reflux symptoms, abdominal pain or other unusual side effects: In this scenario, would consider IV bisphosphonate.  Continue current calcium and vitamin D supplement regimen.  We will anticipate follow-up in 1 year.  Anticipate DXA scan in 9/2024, 2 years after last study.    2.  Hypercalciuria.  Elevation in 24 urine calcium excretion on initial studies in 3/2023 (to 330 mg per 24 hours).  On follow-up testing, 24 urine calcium excretion had improved to 240  mg per 24 hours.  Already follows a low-sodium diet.  Addition of bisphosphonate should be helpful in managing hypercalciuria.  If hypercalciuria becomes more pronounced (we will recheck 24 urine studies prior to next follow-up appointment) could consider introducing thiazide and adjusting other antihypertensives downward.  If her antihypertensives need to be titrated up in the future, could also consider adding a thiazide at that time.    3.  Hypercalcemia.  Mild and intermittent, captured on prior studies.  Recheck calcium along with PTH have been normal.  SPEP and UPEP have also been normal.    4.  History of abnormal thyroid function test.  Noted in 2012 and 2014, normal on follow-up testing last in 2017.  Recheck in 3/2023 showed normal TSH.    5.  High risk for diabetes.  Based on hemoglobin A1c.  Continue periodic monitoring in primary care.    PLAN  -Start alendronate (Fosamax) 70 mg by mouth once a week. Take the medication on an empty stomach, first thing in the morning with at least 8 ounces of water--do not take with other drinks. Remain upright (do not lie down) and do not eat take anything else by mouth (pills, food or drink) until at least 30 minutes after taking the medication.  -Continue current calcium citrate supplement, 2 tablets twice daily  -Continue current vitamin D supplement daily  -Follow-up in 1 year, with blood draw and 24 urine studies before visit (contact me sooner if noting intolerance to Fosamax)  -We will communicate results via [employer], or if needed by phone      Orders Placed This Encounter   Procedures    Calcium timed urine    Sodium timed urine    Creatinine timed urine    Comprehensive metabolic panel    Vitamin D Deficiency         Video-Visit Details    Type of service:  Video visit    Physician location: Offsite    Video Start Time: 1:02 PM  Video End Time: 1:20 PM    I spent a total of 41 minutes on the date of encounter reviewing medical records, evaluating the patient,  coordinating care and documenting in the EHR, as detailed above.      Platform used for Video Visit: Trudy Montaño MD   Division of Diabetes, Endocrinology and Metabolism  Department of Medicine      cc: Joy Dickson MD

## 2023-05-08 NOTE — PROGRESS NOTES
ENDOCRINOLOGY VIDEO FOLLOW-UP        HISTORY OF PRESENT ILLNESS    Nannette Awad (prefers to be called Corrina) is seen in follow-up via billable video visit.    No falls or fractures since last visit.    Corrina has adjusted her calcium supplement as we discussed at her initial visit: Taking 2 tablets twice daily of Fleming brand calcium citrate (2 tablets provide 500 mg calcium citrate, 800 IU vitamin D3).    Does not have much dietary calcium intake.    Also taking separate vitamin D3 2000 IU daily.    She notes that she did not make a significant change in her diet after we discovered hypercalciuria and advised low-sodium diet: She already follows a relatively lower sodium diet.    She has GERD which is managed with PPI: No active symptoms at present.    No dysphagia.    She is not anticipating invasive oral surgery.    She confirms that she completed a full 24 urine collection on most recent 24 urine studies.    Pertinent endocrine and related history:  1.  Low bone density.  -Earliest DXA scan performed in our system was in 2006.  This study showed low bone density, with lowest T score -2.0 at the left femoral neck.  The patient was treated with Fosamax at that time and plan was to continue treatment.  Fosamax treatment continued at 70 mg weekly; in 8/2011, dosage was reduced to 35 mg weekly due to decline in kidney function.  -Most recent DXA scan performed on 9/20/2022 showed low bone density:  L1-L4 T score 0.0, with 2.6% decline in BMD compared to 2019 (significant).  Left femoral neck T score -2.0, left total hip T score -1.5.  Right femoral neck T score -2.0, right total hip T score -1.3.  1.7% decline in BMD at the total mean hip compared to 2019 (not significant).  -In 2/2012, the patient saw Dr. Chavarria in endocrinology.  At that time, endocrinology progress note mentions patient had been on Fosamax for 10 years.  The medication was discontinued at that time.  It does not appear that patient followed up  in endocrinology thereafter.  -Prior fractures:  C1 and C2 compression fracture in 4/2022, diagnosed after acute onset of neck pain. Was sitting on toilet and felt like she was going to pass out--fell (perhaps off toilet) the patient was hospitalized in [facility].  Right foot stress fracture in 6/2010. Had recurrent stress fracture in same foot and in the same bone within 6 months.  2.  History of intermittent hypercalcemia.  Mild, noted in 2021 in 2022.  -No history of nephrolithiasis.  3.  High risk for diabetes.    Other pertinent medical history includes GERD, hypertension, hyperlipidemia, mitral valve prolapse and obesity.    Pertinent Social History: Lives in [place], [place].  , 2 children, 3 grandchildren, after children grew up worked as an . Now retired.    PAST MEDICAL HISTORY  Past Medical History:   Diagnosis Date     Bursitis of knee 2012    Left leg     Carpal tunnel syndrome 1970    Left wrist, no surgery     Elevated fasting glucose      Family history of breast cancer      GERD (gastroesophageal reflux disease)     EGD      HSIL (high grade squamous intraepithelial lesion) on Pap smear of cervix 2006    s/p hyst      HTN (hypertension)      Hyperlipidemia LDL goal < 130 2002     Insomnia 1995     Left sided sciatica 2012     Migraine      Mitral (valve) prolapse      Obesity 4/16/2014     Osteopenia 2000     Psoriasis      Raynaud's disease      Tubular adenoma      Vitamin D deficiency 2008       MEDICATIONS  Current Outpatient Medications   Medication Sig Dispense Refill     amLODIPine (NORVASC) 10 MG tablet Take 1 tablet (10 mg) by mouth daily 90 tablet 1     Calcium Carbonate-Vitamin D (CALCIUM 500 + D PO) Take 500 mg by mouth 2 times daily 2 tablets bid       clobetasol (TEMOVATE) 0.05 % external ointment Apply topically 2 times daily       Cyanocobalamin (VITAMIN B-12 PO) Take 500 mcg by mouth daily       gabapentin (NEURONTIN) 300 MG capsule Take 600 mg by mouth 2  times daily       lisinopril (ZESTRIL) 10 MG tablet Take 1 tablet (10 mg) by mouth daily 90 tablet 1     MULTI-DAY OR daily       omeprazole (PRILOSEC) 20 MG DR capsule TAKE 1 CAPSULE EVERY DAY 90 capsule 3     pravastatin (PRAVACHOL) 40 MG tablet TAKE 1 TABLET EVERY DAY 90 tablet 3     sertraline (ZOLOFT) 100 MG tablet Take 1 tablet (100 mg) by mouth daily for 30 days, THEN 0.5 tablets (50 mg) daily for 30 days. 45 tablet 0     SUMAtriptan (IMITREX) 50 MG tablet TAKE 1 TABLET  AT ONSET OF HEADACHE FOR MIGRAINE MAY REPEAT IN 2 HOURS. MAX 4 TABLETS/24 HOURS. 27 tablet 1     VITAMIN D 1000 UNIT OR TABS 1 TABLET DAILY       zolpidem (AMBIEN) 5 MG tablet Take 1 tablet (5 mg) by mouth nightly as needed for sleep 90 tablet 1       Allergies, family, and social history were reviewed and documented as needed in EHR.     REVIEW OF SYSTEMS  A focused ROS was performed, with pertinent positives and negatives as noted in the HPI.    PHYSICAL EXAM  There were no vitals taken for this visit.  There is no height or weight on file to calculate BMI.  Constitutional: Patient is alert, oriented and appears in no acute distress.  Eyes: Eyes grossly normal to inspection, EOMI, no stare, lid lag, or retraction; no conjunctival injection.  ENMT: Lips are without lesions.   Neck: No visible goiter or neck mass.  Respiratory: No audible wheeze or cough. No visible cyanosis. No visible increased work of breathing.  Neurological: Alert and oriented times 3.  Cranial nerves grossly intact.      DATA REVIEW  Each of the following laboratory and/or imaging studies were reviewed.    Component      Latest Ref Rng 3/13/2023  1:44 PM 3/13/2023  1:46 PM 4/28/2023  10:36 AM   Calcium Urine mg/dL      mg/dL 13.5   13.2    Duration in hours      h 24.0  24.0  24.0    Duration in hours       24.0   24.0    Volume in mL      mL 2,450  2,450  1,800    Volume in mL       2,450   1,800    Calcium Urine g/24 h      0.10 - 0.30 g/spec 0.33 (H)   0.24     Creatinine Urine      mg/dL  49     Creatinine Urine Timed      0.80 - 1.80 g/spec  1.20     Sodium Urine mmol/L      mmol/L 101   88    Sodium Urine mmol/24 h      40 - 220 mmol/spec 247 (H)   158      Component      Latest Ref [facility] 4/28/2023  11:08 AM   Calcium Urine mg/dL      mg/dL    Duration in hours      h 24.0    Volume in mL      mL 1,800    Calcium Urine g/24 h      0.10 - 0.30 g/spec    Creatinine Urine      mg/dL 44    Creatinine Urine Timed      0.80 - 1.80 g/spec 0.79 (L)    Sodium Urine mmol/L      mmol/L    Sodium Urine mmol/24 h      40 - 220 mmol/spec       Legend:  (H) High  (L) Low      Component      Latest Ref [facility] 3/3/2023  10:39 AM   RBC Count      3.80 - 5.20 10e6/uL 4.89    Hemoglobin      11.7 - 15.7 g/dL 14.1    Hematocrit      35.0 - 47.0 % 43.0    MCV      78 - 100 fL 88    MCH      26.5 - 33.0 pg 28.8    MCHC      31.5 - 36.5 g/dL 32.8    RDW      10.0 - 15.0 % 14.7    Platelet Count      150 - 450 10e3/uL 250    Albumin Fraction      3.7 - 5.1 g/dL 4.3    Alpha 1 Fraction      0.2 - 0.4 g/dL 0.3    Alpha 2 Fraction      0.5 - 0.9 g/dL 0.7    Beta Fraction      0.6 - 1.0 g/dL 0.9    Gamma Fraction      0.7 - 1.6 g/dL 0.9    Monoclonal Peak      <=0.0 g/dL 0.0    ELP Interpretation: Essentially normal electrophoretic pattern. No obvious monoclonal proteins seen. Pathologic significance requires clinical correlation. Bryanna Wu M.D., Ph.D.        Component      Latest Ref [facility] 3/3/2023  10:39 AM   Vitamin D Deficiency screening      20 - 75 ug/L 48    Immunofix ELP Urine No monoclonal protein seen on immunofixation. Pathologic significance requires clinical correlation. Bryanna Wu M.D., Ph.D.    ELP Interpretation Urine Only trace albumin and trace globulins. No obvious monoclonal protein seen and the absence of a monoclonal immunoglobulins was confirmed by immunofixation of this same urine sample. Pathologic significance requires clinical correlation. Bryanna Wu M.D., Ph.D.     Phosphorus      2.5 - 4.5 mg/dL 3.3    Parathyroid Hormone Intact      15 - 65 pg/mL 23    Total Protein Serum for ELP      6.4 - 8.3 g/dL 7.1          ASSESSMENT  1.  Low bone density.  With high FRAX calculated 10-year risk of fracture (18% for major osteoporotic fracture, 3.2% for hip fracture, accounting for prior fracture history).  Although she has had cervical spine and foot fractures, these would not technically qualify as osteoporotic fractures.  We screened for occult vertebral fractures in the thoracic and lumbar spine, no fractures were noted on x-rays.  Our work-up for secondary causes of low bone density revealed mild hypercalciuria (as discussed below) otherwise normal screening for multiple myeloma, normal PTH, normal CBC and thyroid function test.  Given these findings, would proceed with pharmacologic therapy: Recommend alendronate.  Although she has reflux, it is well managed with current regimen.  We discussed how best to take alendronate to maximize its absorption's.  We reviewed benefits and potential side effects of this medication, including potential for rare side effects such as ONJ and AFF. I have asked Pat to update me if she notices recurrence of reflux symptoms, abdominal pain or other unusual side effects: In this scenario, would consider IV bisphosphonate.  Continue current calcium and vitamin D supplement regimen.  We will anticipate follow-up in 1 year.  Anticipate DXA scan in 9/2024, 2 years after last study.    2.  Hypercalciuria.  Elevation in 24 urine calcium excretion on initial studies in 3/2023 (to 330 mg per 24 hours).  On follow-up testing, 24 urine calcium excretion had improved to 240 mg per 24 hours.  Already follows a low-sodium diet.  Addition of bisphosphonate should be helpful in managing hypercalciuria.  If hypercalciuria becomes more pronounced (we will recheck 24 urine studies prior to next follow-up appointment) could consider introducing thiazide and adjusting  other antihypertensives downward.  If her antihypertensives need to be titrated up in the future, could also consider adding a thiazide at that time.    3.  Hypercalcemia.  Mild and intermittent, captured on prior studies.  Recheck calcium along with PTH have been normal.  SPEP and UPEP have also been normal.    4.  History of abnormal thyroid function test.  Noted in 2012 and 2014, normal on follow-up testing last in 2017.  Recheck in 3/2023 showed normal TSH.    5.  High risk for diabetes.  Based on hemoglobin A1c.  Continue periodic monitoring in primary care.    PLAN  -Start alendronate (Fosamax) 70 mg by mouth once a week. Take the medication on an empty stomach, first thing in the morning with at least 8 ounces of water--do not take with other drinks. Remain upright (do not lie down) and do not eat take anything else by mouth (pills, food or drink) until at least 30 minutes after taking the medication.  -Continue current calcium citrate supplement, 2 tablets twice daily  -Continue current vitamin D supplement daily  -Follow-up in 1 year, with blood draw and 24 urine studies before visit (contact me sooner if noting intolerance to Fosamax)  -We will communicate results via [employer], or if needed by phone      Orders Placed This Encounter   Procedures     Calcium timed urine     Sodium timed urine     Creatinine timed urine     Comprehensive metabolic panel     Vitamin D Deficiency         Video-Visit Details    Type of service:  Video visit    Physician location: Offsite    Video Start Time: 1:02 PM  Video End Time: 1:20 PM    I spent a total of 41 minutes on the date of encounter reviewing medical records, evaluating the patient, coordinating care and documenting in the EHR, as detailed above.      Platform used for Video Visit: Trudy Montaño MD   Division of Diabetes, Endocrinology and Metabolism  Department of Medicine      cc: Joy Dickson MD

## 2023-05-08 NOTE — PATIENT INSTRUCTIONS
-Start alendronate (Fosamax) 70 mg by mouth once a week. Take the medication on an empty stomach, first thing in the morning with at least 8 ounces of water--do not take with other drinks. Remain upright (do not lie down) and do not eat take anything else by mouth (pills, food or drink) until at least 30 minutes after taking the medication.  -Continue current calcium citrate supplement, 2 tablets twice daily  -Continue current vitamin D supplement daily  -Follow-up in 1 year, with blood draw and 24 urine studies before visit (contact me sooner if noting intolerance to Fosamax)  -We will communicate results via [employer], or if needed by phone

## 2023-08-17 ENCOUNTER — OFFICE VISIT (OUTPATIENT)
Dept: URGENT CARE | Facility: URGENT CARE | Age: 68
End: 2023-08-17
Payer: COMMERCIAL

## 2023-08-17 ENCOUNTER — ALLIED HEALTH/NURSE VISIT (OUTPATIENT)
Dept: NURSING | Facility: CLINIC | Age: 68
End: 2023-08-17
Payer: COMMERCIAL

## 2023-08-17 VITALS
WEIGHT: 172 LBS | DIASTOLIC BLOOD PRESSURE: 84 MMHG | OXYGEN SATURATION: 97 % | HEART RATE: 107 BPM | TEMPERATURE: 97.9 F | BODY MASS INDEX: 29.07 KG/M2 | SYSTOLIC BLOOD PRESSURE: 146 MMHG | RESPIRATION RATE: 26 BRPM

## 2023-08-17 DIAGNOSIS — R42 DIZZINESS: Primary | ICD-10-CM

## 2023-08-17 DIAGNOSIS — R53.1 WEAKNESS: ICD-10-CM

## 2023-08-17 DIAGNOSIS — R00.0 TACHYCARDIA: ICD-10-CM

## 2023-08-17 DIAGNOSIS — R06.02 SHORTNESS OF BREATH: ICD-10-CM

## 2023-08-17 DIAGNOSIS — R00.2 HEART PALPITATIONS: ICD-10-CM

## 2023-08-17 DIAGNOSIS — R06.02 SOB (SHORTNESS OF BREATH): ICD-10-CM

## 2023-08-17 DIAGNOSIS — R06.82 TACHYPNEA: ICD-10-CM

## 2023-08-17 DIAGNOSIS — R51.9 ACUTE NONINTRACTABLE HEADACHE, UNSPECIFIED HEADACHE TYPE: ICD-10-CM

## 2023-08-17 PROCEDURE — 93000 ELECTROCARDIOGRAM COMPLETE: CPT | Performed by: NURSE PRACTITIONER

## 2023-08-17 PROCEDURE — 99207 PR NO CHARGE NURSE ONLY: CPT

## 2023-08-17 PROCEDURE — 99214 OFFICE O/P EST MOD 30 MIN: CPT | Mod: 25 | Performed by: NURSE PRACTITIONER

## 2023-08-17 NOTE — PROGRESS NOTES
Assessment & Plan     Dizziness    - EKG 12-lead complete w/read - Clinics    Tachycardia      Shortness of breath      Acute nonintractable headache, unspecified headache type      Weakness      Heart palpitations      Tachypnea         Reviewed EKG completed during visit showing sinus rhythm, rate 88 without obvious ST elevation or depression. Recommend ED evaluation for severe worsening dizziness, shortness of breath with tachypnea and tachycardia, weakness, headache, heart palpations as cannot rule out PE, MI, stroke in urgent care which can be life threatening. Patient agreeable and initially declines ambulance, states her  would want her to take one, so EMS called for transport and patient is discharged in stable condition.     No follow-ups on file.    Sofia Bran NP  [facility] [place]    Subjective     Pat is a 68 year old female who presents to clinic today for the following health issues:  Chief Complaint   Patient presents with    Dizziness     Patient presents for evaluation of dizziness. Associated symptoms: headache, shortness of breath, blurred vision, heart palpitations. Denies chest pain. Dizziness and shortness of breath are severe. Denies room spinning sensation. Symptoms have been present for about 4 months intermittent, worsening over the past 2 days and are now near constant. Her  had a stroke a few months ago and she has been under increased stress.     She fractured her neck last April and has had some burning pain, unsure about new numbness or tingling and has been followed by neurolgoy.   She has a history of HTN.     No history of diabetes, lung disease, tobacco use. No history of PE, MI, stroke. She has been drinking plenty of fluids. Drove to clinic herself.     Problem list, Medication list, Allergies, and Medical history reviewed in EPIC.    ROS:  Review of systems negative except for noted above        Objective    BP (!) 146/84   Pulse 107    Temp 97.9  F (36.6  C) (Oral)   Resp 26   Wt 78 kg (172 lb)   SpO2 97%   BMI 29.07 kg/m    Physical Exam  Constitutional:       General: She is not in acute distress.     Appearance: She is not toxic-appearing or diaphoretic.   HENT:      Head: Normocephalic and atraumatic.      Mouth/Throat:      Comments: Tongue midline, smile symmetric  Eyes:      Extraocular Movements: Extraocular movements intact.      Conjunctiva/sclera: Conjunctivae normal.      Pupils: Pupils are equal, round, and reactive to light.   Cardiovascular:      Rate and Rhythm: Regular rhythm. Tachycardia present.      Pulses: Normal pulses.      Heart sounds: Normal heart sounds.   Pulmonary:      Effort: No respiratory distress.      Breath sounds: Normal breath sounds. No wheezing, rhonchi or rales.      Comments: Tachypnea  Skin:     General: Skin is warm and dry.      Capillary Refill: Capillary refill takes less than 2 seconds.   Neurological:      General: No focal deficit present.      Mental Status: She is alert and oriented to person, place, and time.      Cranial Nerves: No cranial nerve deficit.      Sensory: No sensory deficit.      Motor: No weakness.      Coordination: Coordination normal.      Gait: Gait abnormal.      Comments: Having to support self while walking           EKG completed during visit showing sinus rhythm, rate 88 without obvious ST elevation or depression

## 2023-08-17 NOTE — PROGRESS NOTES
Patient presented to the clinic today complaining of heart palpitation, dizziness, feeling flush, increased heart rate, headache, SOB, and sweating.  She can also hear her heart beating in her left ear. This has been going on for about 4 months, but exacerbated over the last 2 days.  When the patient was brought back to the room she had to hang onto the wall and she was SOB.  She drove herself here. She reports that her  had a stroke a few months ago and is still in the [facility] so she is under more stress. Vital as in the chart. She was advised that her symptoms are not appropriate to investigate in a timely manner in the clinic and I will be speaking with the provider for a plan. She became tearful.     Nursing support: Above report was given to Sofia Bran NP.  Care was turned over to her and her team without incident. After assessment She advised calling an ambulance and doing an EKG. Urgent care staff did the EKG/printed a face sheet and I called the ambulance at about 10:24 am.  They arrived about 7 minutes later.  Care was turned over to EMS without incident.  Thank you. Apolonia Paredes R.N.

## 2023-10-25 ENCOUNTER — ALLIED HEALTH/NURSE VISIT (OUTPATIENT)
Dept: FAMILY MEDICINE | Facility: CLINIC | Age: 68
End: 2023-10-25
Payer: COMMERCIAL

## 2023-10-25 VITALS — SYSTOLIC BLOOD PRESSURE: 132 MMHG | DIASTOLIC BLOOD PRESSURE: 58 MMHG

## 2023-10-25 DIAGNOSIS — Z01.30 BP CHECK: Primary | ICD-10-CM

## 2023-10-25 PROCEDURE — 99207 PR NO CHARGE NURSE ONLY: CPT | Performed by: FAMILY MEDICINE

## 2023-10-25 NOTE — PROGRESS NOTES
Nannette Awad was evaluated at [facility] on October 25, 2023 at which time her blood pressure was:    BP Readings from Last 1 Encounters:   10/25/23 132/58       Reviewed lifestyle modifications for blood pressure control and reduction: including making healthy food choices, managing weight, getting regular exercise, smoking cessation, reducing alcohol consumption, monitoring blood pressure regularly.     Symptoms: None    BP Goal:< 140/90 mmHg    BP Assessment:  BP at goal    Potential Reasons for BP too high: NA - Not applicable    BP Follow-Up Plan: Recheck BP in 6 months at pharmacy    Recommendation to Provider: No additional recommendations at this time.    Note completed by: Tresa Felix RP

## 2023-11-29 ENCOUNTER — OFFICE VISIT (OUTPATIENT)
Dept: FAMILY MEDICINE | Facility: CLINIC | Age: 68
End: 2023-11-29
Payer: COMMERCIAL

## 2023-11-29 VITALS
TEMPERATURE: 97.8 F | HEART RATE: 79 BPM | BODY MASS INDEX: 29.91 KG/M2 | RESPIRATION RATE: 16 BRPM | SYSTOLIC BLOOD PRESSURE: 137 MMHG | WEIGHT: 177 LBS | DIASTOLIC BLOOD PRESSURE: 77 MMHG | OXYGEN SATURATION: 98 %

## 2023-11-29 DIAGNOSIS — R55 VASOVAGAL SYNCOPE: Primary | ICD-10-CM

## 2023-11-29 PROCEDURE — 99213 OFFICE O/P EST LOW 20 MIN: CPT | Performed by: FAMILY MEDICINE

## 2023-11-29 RX ORDER — RESPIRATORY SYNCYTIAL VIRUS VACCINE 120MCG/0.5
0.5 KIT INTRAMUSCULAR ONCE
Qty: 1 EACH | Refills: 0 | Status: CANCELLED | OUTPATIENT
Start: 2023-11-29 | End: 2023-11-29

## 2023-11-29 ASSESSMENT — PAIN SCALES - GENERAL: PAINLEVEL: NO PAIN (0)

## 2023-11-29 NOTE — PROGRESS NOTES
"  Assessment & Plan     (R55) Vasovagal syncope  (primary encounter diagnosis)  Comment: issues with this years ago/ 3 episodes this year  Plan: Adult Cardiology Eval  Referral        Pt with sporadic attacks, refer to cardiology for possible tilt table test as all episodes have resulted in falls, the one from 4/2022 resulted in fx of C1 and C2.    Unlikely to catch on 14 day Zio.  No meds affecting rhythm.    Continue current meds           MED REC REQUIRED  Post Medication Reconciliation Status: discharge medications reconciled, continue medications without change  BMI:   Estimated body mass index is 29.91 kg/m  as calculated from the following:    Height as of 3/27/23: 1.638 m (5' 4.5\").    Weight as of this encounter: 80.3 kg (177 lb).       See Patient Instructions    Joy Dickson MD  [facility] [place]   Corrina is a 68 year old, presenting for the following health issues:  ER F/U        11/29/2023     7:16 AM   Additional Questions   Roomed by Chavez albright MA   Accompanied by Self       History of Present Illness       Reason for visit:  Follow up from my Oct. 2023 hospital stay    She eats 0-1 servings of fruits and vegetables daily.She consumes 1 sweetened beverage(s) daily.She exercises with enough effort to increase her heart rate 9 or less minutes per day.  She exercises with enough effort to increase her heart rate 3 or less days per week.   She is taking medications regularly.     See Care Everywhere for review of hospitalization    Has not had another episode.  Does get momentary warning and was able to sit down prior to passing out  H/o vasovagal syncope many years ago, seemed to resolve.    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  Unable to tolerate meds due to side effects     Another episode 8/2023 and now 10/2023.  Does feel palpitations/shortness of breath just prior to LOC.    Blood pressure is well in range, no " new meds  No orthostatic symptoms.    No reports of palpitations, CHACON or exertional chest pain                Review of Systems   Constitutional, HEENT, cardiovascular, pulmonary, gi and gu systems are negative, except as otherwise noted.      Objective    /77   Pulse 79   Temp 97.8  F (36.6  C) (Tympanic)   Resp 16   Wt 80.3 kg (177 lb)   SpO2 98%   Breastfeeding No   BMI 29.91 kg/m    Body mass index is 29.91 kg/m .  Physical Exam   GENERAL: healthy, alert and no distress  EYES: Eyes grossly normal to inspection, PERRL and conjunctivae and sclerae normal  RESP: lungs clear to auscultation - no rales, rhonchi or wheezes  CV: regular rate and rhythm, normal S1 S2, no S3 or S4, no murmur, click or rub, no peripheral edema and peripheral pulses strong  MS: no gross musculoskeletal defects noted, no edema  SKIN: no suspicious lesions or rashes  PSYCH: mentation appears normal, affect normal/bright

## 2023-12-14 DIAGNOSIS — Z12.11 COLON CANCER SCREENING: ICD-10-CM

## 2023-12-18 ENCOUNTER — MYC MEDICAL ADVICE (OUTPATIENT)
Dept: FAMILY MEDICINE | Facility: CLINIC | Age: 68
End: 2023-12-18
Payer: COMMERCIAL

## 2023-12-21 ENCOUNTER — OFFICE VISIT (OUTPATIENT)
Dept: CARDIOLOGY | Facility: CLINIC | Age: 68
End: 2023-12-21
Attending: FAMILY MEDICINE
Payer: COMMERCIAL

## 2023-12-21 VITALS
WEIGHT: 175 LBS | DIASTOLIC BLOOD PRESSURE: 71 MMHG | BODY MASS INDEX: 29.57 KG/M2 | OXYGEN SATURATION: 99 % | SYSTOLIC BLOOD PRESSURE: 157 MMHG | HEART RATE: 81 BPM

## 2023-12-21 DIAGNOSIS — R55 VASOVAGAL SYNCOPE: Primary | ICD-10-CM

## 2023-12-21 PROCEDURE — 99204 OFFICE O/P NEW MOD 45 MIN: CPT | Performed by: INTERNAL MEDICINE

## 2023-12-21 RX ORDER — LIDOCAINE 40 MG/G
CREAM TOPICAL
Status: CANCELLED | OUTPATIENT
Start: 2023-12-21

## 2023-12-21 RX ORDER — SODIUM CHLORIDE 9 MG/ML
INJECTION, SOLUTION INTRAVENOUS CONTINUOUS
Status: CANCELLED | OUTPATIENT
Start: 2023-12-21

## 2023-12-21 NOTE — PROGRESS NOTES
SUBJECTIVE:  Nannette Awad is a 68 year old female who presents for evaluation of recurrent syncopal episode.  Patient having recurrent positional syncopal episodes for quite some time.  She is very concerned as the frequency is increasing and also patient is scared to go out.  Last year patient was sitting on the toilet and had a syncopal episode.  She sustained a fall and cervical spine fracture.  This was treated medically.  On 10/27/2023 patient was on the checkout line at [facility].  [place] unwell.  Some AC feeling in her head few spots in front of the eyes diaphoresis and transiently lost consciousness.  She was taken by ambulance.  Patient remember hearing the paramedics saying that her blood pressure was 60 systolic.  Since then she had 2 more episodes.  5 December and 15 December.  On 15th she was on the line for [employer].  [place] dizzy lightheaded and she sat down and rested.  Symptoms passed away  Also complaining of occasional palpitations.  On 5 December when she had symptoms she checked her blood pressure and it was 78 systolic according to patient.  Patient's past history is significant only for hypertension.  For 10 years she is taking amlodipine 10 mg daily and lisinopril 10 mg daily to control her blood pressure  She has no other cardiac risk factors.  A year ago when she had a syncopal episode a diagnosis of vasovagal syncope was made.    Patient Active Problem List    Diagnosis Date Noted    Hypertension goal BP (blood pressure) < 150/90 12/16/2010     Priority: High    Hyperlipidemia LDL goal <130 10/31/2010     Priority: High    Closed nondisplaced fracture of second cervical vertebra with routine healing 04/20/2022     Priority: Medium    Closed displaced lateral mass fracture of first cervical vertebra with routine healing 04/20/2022     Priority: Medium    Vitamin D deficiency 08/14/2018     Priority: Medium    Migraine without status migrainosus, not intractable, unspecified migraine type  02/15/2016     Priority: Medium    Alopecia 02/15/2016     Priority: Medium    Elevated fasting glucose      Priority: Medium    Family history of breast cancer 04/03/2014     Priority: Medium     Consider genetic counsling      GERD (gastroesophageal reflux disease) 08/29/2012     Priority: Medium    Osteopenia 09/30/2011     Priority: Medium    Advanced directives, counseling/discussion 06/30/2011     Priority: Low     Advance Directive Problem List Overview:   Name Relationship Phone    Primary Health Care Agent            Alternative Health Care Agent          Discussed advance care planning with patient; information given to patient to review. 6/30/2011         .  Current Outpatient Medications   Medication Sig    alendronate (FOSAMAX) 70 MG tablet Take 1 tablet (70 mg) by mouth every 7 days Take 60 minutes before am meal with 8 oz. water. Remain upright for 30 minutes.    amLODIPine (NORVASC) 10 MG tablet Take 1 tablet (10 mg) by mouth daily    Calcium Carbonate-Vitamin D (CALCIUM 500 + D PO) Take 500 mg by mouth 2 times daily 2 tablets bid    clobetasol (TEMOVATE) 0.05 % external ointment Apply topically 2 times daily    Cyanocobalamin (VITAMIN B-12 PO) Take 500 mcg by mouth daily    lisinopril (ZESTRIL) 10 MG tablet Take 1 tablet (10 mg) by mouth daily    MULTI-DAY OR daily    omeprazole (PRILOSEC) 20 MG DR capsule TAKE 1 CAPSULE EVERY DAY    pravastatin (PRAVACHOL) 40 MG tablet TAKE 1 TABLET EVERY DAY    SUMAtriptan (IMITREX) 50 MG tablet TAKE 1 TABLET  AT ONSET OF HEADACHE FOR MIGRAINE MAY REPEAT IN 2 HOURS. MAX 4 TABLETS/24 HOURS.    VITAMIN D 1000 UNIT OR TABS 1 TABLET DAILY    zolpidem (AMBIEN) 5 MG tablet Take 1 tablet (5 mg) by mouth nightly as needed for sleep     No current facility-administered medications for this visit.     Past Medical History:   Diagnosis Date    Bursitis of knee 2012    Left leg    Carpal tunnel syndrome 1970    Left wrist, no surgery    Elevated fasting glucose     Family  history of breast cancer     GERD (gastroesophageal reflux disease)     EGD     HSIL (high grade squamous intraepithelial lesion) on Pap smear of cervix 2006    s/p hyst     HTN (hypertension)     Hyperlipidemia LDL goal < 130 2002    Insomnia 1995    Left sided sciatica 2012    Migraine     Mitral (valve) prolapse     Obesity 4/16/2014    Osteopenia 2000    Psoriasis     Raynaud's disease     Tubular adenoma     Vitamin D deficiency 2008     Past Surgical History:   Procedure Laterality Date    BREAST BIOPSY, CORE RT/LT  1990    COLONOSCOPY      ESOPHAGOSCOPY, GASTROSCOPY, DUODENOSCOPY (EGD), COMBINED  9/17/2012    Procedure: COMBINED ESOPHAGOSCOPY, GASTROSCOPY, DUODENOSCOPY (EGD), BIOPSY SINGLE OR MULTIPLE;  EGD-GERD;  Surgeon: Teo Alvarado MD;  Location: [facility] OR    HYSTERECTOMY, PAP NO LONGER INDICATED  09/22/2006    vaginal hyst with bilateral sal-ooph, HSIL    LIGATN/STRIP LONG OR SHORT SAPHEN  11/09    right leg    SURGICAL HISTORY OF -   2006    conization for abnl PAP     Allergies   Allergen Reactions    Lipitor [Atorvastatin Calcium]      cramps    Sulfa Antibiotics Rash     Social History     Socioeconomic History    Marital status:      Spouse name: Roland    Number of children: 2    Years of education: 16    Highest education level: Not on file   Occupational History    Occupation: [employer]     Employer: [employer]   Tobacco Use    Smoking status: Never    Smokeless tobacco: Never   Vaping Use    Vaping Use: Never used   Substance and Sexual Activity    Alcohol use: No    Drug use: No    Sexual activity: Yes     Partners: Male     Birth control/protection: None   Other Topics Concern    Parent/sibling w/ CABG, MI or angioplasty before 65F 55M? No   Social History Narrative    Not on file     Social Determinants of Health     Financial Resource Strain: Low Risk  (11/22/2023)    Financial Resource Strain     Within the past 12 months, have you or your family members you live with  been unable to get utilities (heat, electricity) when it was really needed?: No   Food Insecurity: Low Risk  (11/22/2023)    Food Insecurity     Within the past 12 months, did you worry that your food would run out before you got money to buy more?: No     Within the past 12 months, did the food you bought just not last and you didn t have money to get more?: No   Transportation Needs: Low Risk  (11/22/2023)    Transportation Needs     Within the past 12 months, has lack of transportation kept you from medical appointments, getting your medicines, non-medical meetings or appointments, work, or from getting things that you need?: No   Physical Activity: Not on file   Stress: Not on file   Social Connections: Not on file   Interpersonal Safety: Low Risk  (11/29/2023)    Interpersonal Safety     Do you feel physically and emotionally safe where you currently live?: Yes     Within the past 12 months, have you been hit, slapped, kicked or otherwise physically hurt by someone?: No     Within the past 12 months, have you been humiliated or emotionally abused in other ways by your partner or ex-partner?: No   Housing Stability: Low Risk  (11/22/2023)    Housing Stability     Do you have housing? : Yes     Are you worried about losing your housing?: No     Family History   Problem Relation Age of Onset    Heart Disease Mother         CHF    Hypertension Mother     Lipids Mother     Neurologic Disorder Mother 30        migraines    Alzheimer Disease Mother 67    Cancer Maternal Grandmother     Hypertension Brother     Cerebrovascular Disease Brother     Hypertension Brother     Thyroid Disease Paternal Grandmother     Breast Cancer Paternal Grandmother     Cancer Father 76        Pancreatic ca age 76    Other Cancer Father         pancreatic    Prostate Cancer Father     Breast Cancer Maternal Aunt           REVIEW OF SYSTEMS:  General: negative, fever, chills, night sweats  Skin: negative, acne, rash, and scaling  Eyes:  negative, double vision, eye pain, and photophobia  Ears/Nose/Throat: negative, nasal congestion, and purulent rhinorrhea  Respiratory: No dyspnea on exertion, No cough, No hemoptysis, and negative  Cardiovascular: negative, chest pain, exertional chest pain or pressure, paroxysmal nocturnal dyspnea, dyspnea on exertion, and orthopnea         OBJECTIVE:  Blood pressure (!) 157/71, pulse 81, weight 79.4 kg (175 lb), SpO2 99%, not currently breastfeeding.  General Appearance: healthy, alert, active, and no distress  Head: Normocephalic. No masses, lesions, tenderness or abnormalities  Eyes: conjuctiva clear, PERRL, EOM intact  Ears: External ears normal. Canals clear. TM's normal.  Nose: Nares normal  Mouth: normal  Neck: Supple, no cervical adenopathy, no thyromegaly  Lungs: clear to auscultation  Cardiac: regular rate and rhythm, normal S1 and S2, no murmur       ASSESSMENT/PLAN:  Patient here for evaluation of positional syncopal episodes.  Had a syncopal episode over a year ago.  She had a fall at the bathroom.  Sustained fracture to cervical spine.  Since then patient had 3 more episodes.  First episode was on 10/27/2023.  She was standing on a line at [facility].  [place] dizzy lightheaded blurred vision and transiently lost consciousness no other cardiac symptoms.  Paramedics were called and she was taken to the emergency room.  On the way to the emergency room patient had paramedics saying that her blood pressure was 60 mmHg.  At the emergency room initial basic metabolic panel showed potassium of 2 magnesium of 1.1 but this was drawn from the line through which she was getting IV fluids.  Subsequent testing was normal.  Her EKG showed normal sinus rhythm normal EKG.  Lab results were nonconclusive and patient was discharged home with a diagnosis of vasovagal syncope.  Since then patient had 2 more episodes.  The second 1 was at home she felt unwell and took the blood pressure it was in 70s.  Patient rested for half  an hour and symptoms disappeared.  The third episode was while she was standing on the line at [employer].  Overall sounds like vasovagal syncope but patient is extremely scared to go out these days.  Will plan for 7-day Zio patch to rule out arrhythmia.  Also will arrange for a tilt table test to see postural hypotension patient to be contacted with test result and further planning.  Emergency room department lab results EKG reviewed.  She had an echocardiogram which was normal.  Total visit duration 45 minutes.  This included face-to-face interview, physical exam, chart review, review of emergency room records including lab results, EKG, echocardiogram and documentation.

## 2023-12-21 NOTE — LETTER
12/21/2023      RE: Nannette Awad  [address]  [facility] [address]       Dear Colleague,    Thank you for the opportunity to participate in the care of your patient, Nannette Awad, at the [facility] at [facility]. Please see a copy of my visit note below.       SUBJECTIVE:  Nannette Awad is a 68 year old female who presents for evaluation of recurrent syncopal episode.  Patient having recurrent positional syncopal episodes for quite some time.  She is very concerned as the frequency is increasing and also patient is scared to go out.  Last year patient was sitting on the toilet and had a syncopal episode.  She sustained a fall and cervical spine fracture.  This was treated medically.  On 10/27/2023 patient was on the checkout line at [facility].  [place] unwell.  Some AC feeling in her head few spots in front of the eyes diaphoresis and transiently lost consciousness.  She was taken by ambulance.  Patient remember hearing the paramedics saying that her blood pressure was 60 systolic.  Since then she had 2 more episodes.  5 December and 15 December.  On 15th she was on the line for [employer].  [place] dizzy lightheaded and she sat down and rested.  Symptoms passed away  Also complaining of occasional palpitations.  On 5 December when she had symptoms she checked her blood pressure and it was 78 systolic according to patient.  Patient's past history is significant only for hypertension.  For 10 years she is taking amlodipine 10 mg daily and lisinopril 10 mg daily to control her blood pressure  She has no other cardiac risk factors.  A year ago when she had a syncopal episode a diagnosis of vasovagal syncope was made.    Patient Active Problem List    Diagnosis Date Noted     Hypertension goal BP (blood pressure) < 150/90 12/16/2010     Priority: High     Hyperlipidemia LDL goal <130 10/31/2010     Priority: High     Closed nondisplaced  fracture of second cervical vertebra with routine healing 04/20/2022     Priority: Medium     Closed displaced lateral mass fracture of first cervical vertebra with routine healing 04/20/2022     Priority: Medium     Vitamin D deficiency 08/14/2018     Priority: Medium     Migraine without status migrainosus, not intractable, unspecified migraine type 02/15/2016     Priority: Medium     Alopecia 02/15/2016     Priority: Medium     Elevated fasting glucose      Priority: Medium     Family history of breast cancer 04/03/2014     Priority: Medium     Consider genetic counsling       GERD (gastroesophageal reflux disease) 08/29/2012     Priority: Medium     Osteopenia 09/30/2011     Priority: Medium     Advanced directives, counseling/discussion 06/30/2011     Priority: Low     Advance Directive Problem List Overview:   Name Relationship Phone    Primary Health Care Agent            Alternative Health Care Agent          Discussed advance care planning with patient; information given to patient to review. 6/30/2011         .  Current Outpatient Medications   Medication Sig     alendronate (FOSAMAX) 70 MG tablet Take 1 tablet (70 mg) by mouth every 7 days Take 60 minutes before am meal with 8 oz. water. Remain upright for 30 minutes.     amLODIPine (NORVASC) 10 MG tablet Take 1 tablet (10 mg) by mouth daily     Calcium Carbonate-Vitamin D (CALCIUM 500 + D PO) Take 500 mg by mouth 2 times daily 2 tablets bid     clobetasol (TEMOVATE) 0.05 % external ointment Apply topically 2 times daily     Cyanocobalamin (VITAMIN B-12 PO) Take 500 mcg by mouth daily     lisinopril (ZESTRIL) 10 MG tablet Take 1 tablet (10 mg) by mouth daily     MULTI-DAY OR daily     omeprazole (PRILOSEC) 20 MG DR capsule TAKE 1 CAPSULE EVERY DAY     pravastatin (PRAVACHOL) 40 MG tablet TAKE 1 TABLET EVERY DAY     SUMAtriptan (IMITREX) 50 MG tablet TAKE 1 TABLET  AT ONSET OF HEADACHE FOR MIGRAINE MAY REPEAT IN 2 HOURS. MAX 4 TABLETS/24 HOURS.     VITAMIN  D 1000 UNIT OR TABS 1 TABLET DAILY     zolpidem (AMBIEN) 5 MG tablet Take 1 tablet (5 mg) by mouth nightly as needed for sleep     No current facility-administered medications for this visit.     Past Medical History:   Diagnosis Date     Bursitis of knee 2012    Left leg     Carpal tunnel syndrome 1970    Left wrist, no surgery     Elevated fasting glucose      Family history of breast cancer      GERD (gastroesophageal reflux disease)     EGD      HSIL (high grade squamous intraepithelial lesion) on Pap smear of cervix 2006    s/p hyst      HTN (hypertension)      Hyperlipidemia LDL goal < 130 2002     Insomnia 1995     Left sided sciatica 2012     Migraine      Mitral (valve) prolapse      Obesity 4/16/2014     Osteopenia 2000     Psoriasis      Raynaud's disease      Tubular adenoma      Vitamin D deficiency 2008     Past Surgical History:   Procedure Laterality Date     BREAST BIOPSY, CORE RT/LT  1990     COLONOSCOPY       ESOPHAGOSCOPY, GASTROSCOPY, DUODENOSCOPY (EGD), COMBINED  9/17/2012    Procedure: COMBINED ESOPHAGOSCOPY, GASTROSCOPY, DUODENOSCOPY (EGD), BIOPSY SINGLE OR MULTIPLE;  EGD-GERD;  Surgeon: Teo Alvarado MD;  Location: [facility] OR     HYSTERECTOMY, PAP NO LONGER INDICATED  09/22/2006    vaginal hyst with bilateral sal-ooph, HSIL     LIGATN/STRIP LONG OR SHORT SAPHEN  11/09    right leg     SURGICAL HISTORY OF -   2006    conization for abnl PAP     Allergies   Allergen Reactions     Lipitor [Atorvastatin Calcium]      cramps     Sulfa Antibiotics Rash     Social History     Socioeconomic History     Marital status:      Spouse name: Roland     Number of children: 2     Years of education: 16     Highest education level: Not on file   Occupational History     Occupation:      Employer: [employer]   Tobacco Use     Smoking status: Never     Smokeless tobacco: Never   Vaping Use     Vaping Use: Never used   Substance and Sexual Activity     Alcohol use: No     Drug use: No      Sexual activity: Yes     Partners: Male     Birth control/protection: None   Other Topics Concern     Parent/sibling w/ CABG, MI or angioplasty before 65F 55M? No   Social History Narrative     Not on file     Social Determinants of Health     Financial Resource Strain: Low Risk  (11/22/2023)    Financial Resource Strain      Within the past 12 months, have you or your family members you live with been unable to get utilities (heat, electricity) when it was really needed?: No   Food Insecurity: Low Risk  (11/22/2023)    Food Insecurity      Within the past 12 months, did you worry that your food would run out before you got money to buy more?: No      Within the past 12 months, did the food you bought just not last and you didn t have money to get more?: No   Transportation Needs: Low Risk  (11/22/2023)    Transportation Needs      Within the past 12 months, has lack of transportation kept you from medical appointments, getting your medicines, non-medical meetings or appointments, work, or from getting things that you need?: No   Physical Activity: Not on file   Stress: Not on file   Social Connections: Not on file   Interpersonal Safety: Low Risk  (11/29/2023)    Interpersonal Safety      Do you feel physically and emotionally safe where you currently live?: Yes      Within the past 12 months, have you been hit, slapped, kicked or otherwise physically hurt by someone?: No      Within the past 12 months, have you been humiliated or emotionally abused in other ways by your partner or ex-partner?: No   Housing Stability: Low Risk  (11/22/2023)    Housing Stability      Do you have housing? : Yes      Are you worried about losing your housing?: No     Family History   Problem Relation Age of Onset     Heart Disease Mother         CHF     Hypertension Mother      Lipids Mother      Neurologic Disorder Mother 30        migraines     Alzheimer Disease Mother 67     Cancer Maternal Grandmother      Hypertension Brother       Cerebrovascular Disease Brother      Hypertension Brother      Thyroid Disease Paternal Grandmother      Breast Cancer Paternal Grandmother      Cancer Father 76        Pancreatic ca age 76     Other Cancer Father         pancreatic     Prostate Cancer Father      Breast Cancer Maternal Aunt           REVIEW OF SYSTEMS:  General: negative, fever, chills, night sweats  Skin: negative, acne, rash, and scaling  Eyes: negative, double vision, eye pain, and photophobia  Ears/Nose/Throat: negative, nasal congestion, and purulent rhinorrhea  Respiratory: No dyspnea on exertion, No cough, No hemoptysis, and negative  Cardiovascular: negative, chest pain, exertional chest pain or pressure, paroxysmal nocturnal dyspnea, dyspnea on exertion, and orthopnea         OBJECTIVE:  Blood pressure (!) 157/71, pulse 81, weight 79.4 kg (175 lb), SpO2 99%, not currently breastfeeding.  General Appearance: healthy, alert, active, and no distress  Head: Normocephalic. No masses, lesions, tenderness or abnormalities  Eyes: conjuctiva clear, PERRL, EOM intact  Ears: External ears normal. Canals clear. TM's normal.  Nose: Nares normal  Mouth: normal  Neck: Supple, no cervical adenopathy, no thyromegaly  Lungs: clear to auscultation  Cardiac: regular rate and rhythm, normal S1 and S2, no murmur       ASSESSMENT/PLAN:  Patient here for evaluation of positional syncopal episodes.  Had a syncopal episode over a year ago.  She had a fall at the bathroom.  Sustained fracture to cervical spine.  Since then patient had 3 more episodes.  First episode was on 10/27/2023.  She was standing on a line at [facility].  [place] dizzy lightheaded blurred vision and transiently lost consciousness no other cardiac symptoms.  Paramedics were called and she was taken to the emergency room.  On the way to the emergency room patient had paramedics saying that her blood pressure was 60 mmHg.  At the emergency room initial basic metabolic panel showed potassium of 2  magnesium of 1.1 but this was drawn from the line through which she was getting IV fluids.  Subsequent testing was normal.  Her EKG showed normal sinus rhythm normal EKG.  Lab results were nonconclusive and patient was discharged home with a diagnosis of vasovagal syncope.  Since then patient had 2 more episodes.  The second 1 was at home she felt unwell and took the blood pressure it was in 70s.  Patient rested for half an hour and symptoms disappeared.  The third episode was while she was standing on the line at [employer].  Overall sounds like vasovagal syncope but patient is extremely scared to go out these days.  Will plan for 7-day Zio patch to rule out arrhythmia.  Also will arrange for a tilt table test to see postural hypotension patient to be contacted with test result and further planning.  Emergency room department lab results EKG reviewed.  She had an echocardiogram which was normal.  Total visit duration 45 minutes.  This included face-to-face interview, physical exam, chart review, review of emergency room records including lab results, EKG, echocardiogram and documentation.      Please do not hesitate to contact me if you have any questions/concerns.     Sincerely,     JADE Sen MD

## 2023-12-21 NOTE — PATIENT INSTRUCTIONS
Thank you for coming to the [facility] @ [facility]; please note the following instructions:    1.  7 day zio heart monitor    2.  PLEASE CALL ESTEPHANIA GALLO  TO SCHEDULE THE TILT TABLE STUDY  Tilt Table/Autonomic Study    Visitor Policy: Two visitors.    Below are instructions, if you have questions please contact our office.     1. You should arrive at the [facility] at _______  ([address], [place]: [phone]).    2. Report to the GOLD waiting room. Your procedure will be done in the Catheterization Lab.     3. Wear comfortable clothing. (sweat/yoga pants, t-shirt). Please allow 3-4 hours for this procedure to be completed.     4. Do not eat or drink ANYTHING for 3 hours prior to arrival.     5. The morning of your procedure, you may take any scheduled medications with a SIP of water- unless you were instructed differently by your physician. Do not take any vitamins, minerals or herbal supplements. Hold midodrine/florinef 2 days prior. Hold ivabradine/stimulants & Beta blockers/heart rate medications day before and morning of.    6. You may drive yourself to and from this procedure, although we recommend a  incase you do not feel well afterwards.       If you have further questions, please utilize [employer] to contact us.   If your question concerns the above instructions, contact:  Deepika Goyal RN  Electrophysiology Nurse Coordinator.  [phone]    If your question concerns the schedule/appointment times, contact:  ESTEPHANIA Urias Procedure   [phone]          If you have any questions regarding your visit please contact your care team:     Cardiology  Telephone Number   Deepika PITTMAN, MICHAEL GRIMM, MICHAEL MARINA, MICHAEL MORRIS, KATHRINE VENEGAS, KATHRINE DAILY, Visit Facilitator  Bryanna ANNA [facility] [phone] (option 1)   For scheduling appts:     [phone] (select option 1)       For the Device Clinic (Pacemakers and ICD's)  RN's  :  Trena Gibson   During business hours: [phone]    *After business hours:  [phone] (select option 4)      Normal test result notifications will be released via [employer] or mailed within 7 business days.  All other test results, will be communicated via telephone once reviewed by your cardiologist.    If you need a medication refill please contact your pharmacy.  Please allow 3 business days for your refill to be completed.    As always, thank you for trusting us with your health care needs!

## 2023-12-21 NOTE — NURSING NOTE
"Chief Complaint   Patient presents with    RECHECK     Return general cardiology for Vasovagal syncope       Initial BP (!) 151/70 (BP Location: Left arm, Patient Position: Chair, Cuff Size: Adult Large)   Pulse 81   Wt 79.4 kg (175 lb)   SpO2 99%   BMI 29.57 kg/m   Estimated body mass index is 29.57 kg/m  as calculated from the following:    Height as of 3/27/23: 1.638 m (5' 4.5\").    Weight as of this encounter: 79.4 kg (175 lb)..  BP completed using cuff size: KATHRINE Loomis    "

## 2023-12-28 ENCOUNTER — LAB (OUTPATIENT)
Dept: FAMILY MEDICINE | Facility: CLINIC | Age: 68
End: 2023-12-28
Payer: COMMERCIAL

## 2023-12-28 DIAGNOSIS — Z12.11 COLON CANCER SCREENING: ICD-10-CM

## 2024-01-18 NOTE — H&P
H&P from office visit with Dr Zavala on 12/21/23 reviewed. Following today's exam there are no interval changes.       Meche Loo PA-C  [facility]  Electrophysiology Consult Service  Pager: [phone]

## 2024-01-19 ENCOUNTER — APPOINTMENT (OUTPATIENT)
Dept: MEDSURG UNIT | Facility: CLINIC | Age: 69
End: 2024-01-19
Attending: INTERNAL MEDICINE
Payer: COMMERCIAL

## 2024-01-19 ENCOUNTER — HOSPITAL ENCOUNTER (OUTPATIENT)
Facility: CLINIC | Age: 69
Discharge: HOME | End: 2024-01-19
Attending: INTERNAL MEDICINE | Admitting: INTERNAL MEDICINE
Payer: COMMERCIAL

## 2024-01-19 ENCOUNTER — APPOINTMENT (OUTPATIENT)
Dept: CARDIOLOGY | Facility: CLINIC | Age: 69
End: 2024-01-19
Attending: STUDENT IN AN ORGANIZED HEALTH CARE EDUCATION/TRAINING PROGRAM
Payer: COMMERCIAL

## 2024-01-19 VITALS
OXYGEN SATURATION: 95 % | DIASTOLIC BLOOD PRESSURE: 91 MMHG | HEIGHT: 64 IN | SYSTOLIC BLOOD PRESSURE: 131 MMHG | BODY MASS INDEX: 30.03 KG/M2 | TEMPERATURE: 97.7 F | RESPIRATION RATE: 16 BRPM | WEIGHT: 175.9 LBS | HEART RATE: 85 BPM

## 2024-01-19 DIAGNOSIS — R55 VASOVAGAL SYNCOPE: Primary | ICD-10-CM

## 2024-01-19 LAB — LVEF ECHO: NORMAL

## 2024-01-19 PROCEDURE — 93306 TTE W/DOPPLER COMPLETE: CPT

## 2024-01-19 PROCEDURE — 999N000248 HC STATISTIC IV INSERT WITH US BY RN

## 2024-01-19 PROCEDURE — 95921 AUTONOMIC NRV PARASYM INERVJ: CPT | Performed by: INTERNAL MEDICINE

## 2024-01-19 PROCEDURE — 93660 TILT TABLE EVALUATION: CPT | Performed by: INTERNAL MEDICINE

## 2024-01-19 PROCEDURE — 999N000132 HC STATISTIC PP CARE STAGE 1

## 2024-01-19 PROCEDURE — 999N000142 HC STATISTIC PROCEDURE PREP ONLY

## 2024-01-19 PROCEDURE — 272N000001 HC OR GENERAL SUPPLY STERILE: Performed by: INTERNAL MEDICINE

## 2024-01-19 PROCEDURE — 95921 AUTONOMIC NRV PARASYM INERVJ: CPT | Mod: 26 | Performed by: INTERNAL MEDICINE

## 2024-01-19 PROCEDURE — 93306 TTE W/DOPPLER COMPLETE: CPT | Mod: 26 | Performed by: INTERNAL MEDICINE

## 2024-01-19 PROCEDURE — 93660 TILT TABLE EVALUATION: CPT | Mod: 26 | Performed by: INTERNAL MEDICINE

## 2024-01-19 RX ORDER — MIDODRINE HYDROCHLORIDE 2.5 MG/1
TABLET ORAL
Qty: 90 TABLET | Refills: 2 | Status: SHIPPED | OUTPATIENT
Start: 2024-01-19 | End: 2024-01-31

## 2024-01-19 RX ORDER — AMOXICILLIN 500 MG/1
500 CAPSULE ORAL 3 TIMES DAILY
COMMUNITY
Start: 2024-01-18 | End: 2024-01-24

## 2024-01-19 RX ORDER — LIDOCAINE 40 MG/G
CREAM TOPICAL
Status: DISCONTINUED | OUTPATIENT
Start: 2024-01-19 | End: 2024-01-19 | Stop reason: HOSPADM

## 2024-01-19 RX ORDER — SODIUM CHLORIDE 9 MG/ML
INJECTION, SOLUTION INTRAVENOUS CONTINUOUS
Status: DISCONTINUED | OUTPATIENT
Start: 2024-01-19 | End: 2024-01-19 | Stop reason: HOSPADM

## 2024-01-19 ASSESSMENT — ACTIVITIES OF DAILY LIVING (ADL)
ADLS_ACUITY_SCORE: 35

## 2024-01-19 NOTE — Clinical Note
Report called to KAI, RN, Toni. Procedure indication, procedure, and outcome reviewed. Pt returned to 2A per W/C in alert and stable condition.

## 2024-01-19 NOTE — PROCEDURES
EP PROCEDURE NOTE    *Procedures:*    1. TILT table test.  2. Autonomic function test.       *Cardiologist:*  Giovani Rosado    *EP Fellow:*  N/A    *Referring MD: *  Dr CARMEN Zavala ([facility])    *Pre-operative Diagnosis:*  Syncope.    *Complications:*  None.     *Medications:*  NTG 0.4mg SL/None.     *Clinical Profile:*  Nannette Awad is a 68 year old female who presents for evaluation of recurrent syncopal episode.  Patient having recurrent positional syncopal episodes for quite some time.  She is very concerned as the frequency is increasing and also patient is scared to go out.  Last year patient was sitting on the toilet and had a syncopal episode.  She sustained a fall and cervical spine fracture.  This was treated medically.  On 10/27/2023 patient was on the checkout line at [facility].  [place] unwell.  Some AC feeling in her head few spots in front of the eyes diaphoresis and transiently lost consciousness.  She was taken by ambulance.  Patient remember hearing the paramedics saying that her blood pressure was 60 systolic.  Since then she had 2 more episodes.  5 December and 15 December.  On 15th she was on the line for [employer].  [place] dizzy lightheaded and she sat down and rested.P    The patient is here for autonomic testing including tilt testing.     Please see [facility] clinic visit note  for details.      PROCEDURE    The risks and benefits of the procedure were explained to the patient in   full. Written informed consent was obtained. The patient was brought to the   EP lab in a fasting and hemodynamically stable condition. Physical   examination of the patient did not reveal any carotid bruits, and review of   the chart did not reveal the presence of stroke or TIA within the past   three months.     The following maneuvers were done sequentially:    1- Sitting for 5 minutes:   End of 5 min:  HR 82, /73    2- Standing for 10 minutes: Other priorities  Immediate Tyson HR N/A   BP N/A     time from  standing to ernestine N/A      time from ernestine to recovery N/A  30 sec: HR 98, /83  1 min: HR 96, /81  2 min: HR 89, BP 90/61  3 min: HR 74, BP 79/60  4 min: HR 73, BP 80/47.............. patient dizzy and near syncopal, sat down.  Symptoms progressed to where she was very uncomfortable and had to sit with her head downward.  Ultimately her blood pressure continued to be low and she was moved to the table to allow her to be put in Trendelenburg position.  IV was demonstrated to be inadequate and was replaced.  Patient improved with resumption of IV fluids.  Blood pressure gradually increased to approximately 100/70, heart rate remained in the high 70s.    2- Maneuvers in seated position:    A. Carotid sinus massage:  Not done due to patient condition described above    B. Valsalva:   Baseline HR 64 /88  Phase 1 HR 64 /81  Phase 2 HR 94 /100  Phase 4 overshoot HR 86 /89    Valsalva ratio 94/86= 1.09 (borderline low for age)    C.  Respiratory sinus arrhythmia  In 69 out 55 delta 14  In 76 out 55 delta 21  In 78 out 58 delta 20  In 80 out 58 delta 22  In 80 out 59 delta 21    Average delta 98/5= 19.6 (normal)    3- Supine rest for 5 minutes:   HR 75-77, Max -130/80    4- TILT at 70 degrees for 20 minutes: (1 liter fluid was given before tilt)  Not done due to patient's clinical status as noted above    5-no meds administered    DISCUSSION:  Autonomic study was borderline.  Respiratory sinus arrhythmia was normal but Valsalva was borderline to low.  Findings during active standing showed marked drop in blood pressure but no compensatory tachycardia.  In fact the heart rate slowing with upright posture and associated symptoms of pallor, headache and feeling poorly with more compatible with a vasovagal response.  Consequently the diagnosis is not clear at this time and reevaluation at a future date may be beneficial.  In the meantime discussion  with family on station 2A after the  procedure focused on initiation of low-dose midodrine and the use of compressive undergarments.    Patient's blood pressure today by finger plethysmography was in the range that the patient indicated she has at home with systolic values of 130-140.  Cuff pressure tended to be higher today reading 190/90.  There is concern that excessive increase in salt and electrolytes may aggravate baseline blood pressure and will not be recommended at this time.    Findings were discussed at length with patient and .    Follow-up will be arranged by video and if necessary further testing will be conducted at a later date.    I appreciated the opportunity to see and assess   Mrs Awad and direct the procedure described above.. The above note summarizes my findings and current recommendations. Please do not hesitate to contact me if you have any questions or concerns.    Giovani Rosado MD [facility]   Pager [phone]  Office [phone]     Dr Rosado was present throughout the entire procedure.

## 2024-01-19 NOTE — DISCHARGE INSTRUCTIONS
[facility]  DISCHARGE INSTRUCTIONS FOR   TILT TABLE WITH  VASOVAGAL SYNCOPE    Date:1/19/23    Plan:  Your tilt table showed you have vasovagal syncope.    - Start new medication, Midodrine three times a day 5mg, 2.5mg, 2.5mg. This will be sent to you pharmacy, [facility].   - Change positions carefully and slowly and maintain safe environment.  - Standing training and supine isometric exercises.    Cardiovascular Clinic:  [address] [place] [address], [id number]    Your Care Team:  EP Cardiology   Telephone Number     Cardiac Cath Lab   [facility]  Dr. Rosado  [phone]    After business hours: [phone], select option 4, and ask for EP Fellow on-call to be paged.   Non-procedure scheduling:  Yelena GUADARRAMA   [phone]   Procedure scheduling:    Aliya Valdes   [phone]   [facility]   [address]   [phone]      As always, Thank you for trusting us with your health care needs

## 2024-01-19 NOTE — PROGRESS NOTES
Patient prepped for tilt table. PIV inserted, IV fluids running. VSS. Consent signed. Ruddy Gonzalez with patient, Tonio will be picking patient up around 2pm.

## 2024-01-19 NOTE — PROGRESS NOTES
Patient returned from tilt table to 2A. VSS. Eating and drinking. Dr. Rosado discussing results with patient at bedside. Echo completed. Discharge instructions gone over with patients, discharge medications going to be sent to patient's home pharmacy. Contact information given to patient. Discharged with spouse.

## 2024-01-22 DIAGNOSIS — R55 VASOVAGAL SYNCOPE: ICD-10-CM

## 2024-01-22 NOTE — TELEPHONE ENCOUNTER
"midodrine (PROAMATINE) 2.5 MG tablet   90 tablet 2 1/19/2024 12/21/2023  [facility] JADE Brandt MD  Cardiovascular Disease    Routed because: per  pt call , requests 90 day supply per insurance.please verify 90 day qty.  Rf? Also asks  \" The patient would like to know how long will she be taking this medication if effective for her? \"  "

## 2024-01-22 NOTE — TELEPHONE ENCOUNTER
[facility] Call Center    Phone Message    May a detailed message be left on voicemail: yes     Reason for Call: Medication Refill Request    Has the patient contacted the pharmacy for the refill? Yes   Name of medication being requested: midodrine (PROAMATINE) 2.5 MG tablet   Provider who prescribed the medication: Meche Loo  Pharmacy:    [id number] - [place], [place] - [address]    Date medication is needed: The patient asked for this to go to another pharmacy, per insurance needs a 3 month supply.   The patient would like to know how long will she be taking this medication if effective for her?    Action Taken: Other: Cardiology    Travel Screening: Not Applicable    Thank you!  [facility]

## 2024-01-23 DIAGNOSIS — R55 SYNCOPE AND COLLAPSE: Primary | ICD-10-CM

## 2024-01-24 ENCOUNTER — MYC MEDICAL ADVICE (OUTPATIENT)
Dept: CARDIOLOGY | Facility: CLINIC | Age: 69
End: 2024-01-24
Payer: COMMERCIAL

## 2024-01-25 NOTE — TELEPHONE ENCOUNTER
Dr. Zavala please review zio report not yet in system.  Final reading states no significant arrhythmias recorded.  Symptomatic episode corresponded with NSR.    Deepika Goyal, RN  Cardiology Care Coordinator  [facility]  [phone] option 1

## 2024-01-26 DIAGNOSIS — I10 HYPERTENSION GOAL BP (BLOOD PRESSURE) < 150/90: ICD-10-CM

## 2024-01-26 RX ORDER — AMLODIPINE BESYLATE 10 MG/1
10 TABLET ORAL DAILY
Qty: 90 TABLET | Refills: 0 | Status: SHIPPED | OUTPATIENT
Start: 2024-01-26 | End: 2024-01-31

## 2024-01-26 RX ORDER — LISINOPRIL 10 MG/1
10 TABLET ORAL DAILY
Qty: 90 TABLET | Refills: 0 | Status: SHIPPED | OUTPATIENT
Start: 2024-01-26 | End: 2024-01-31

## 2024-01-29 ENCOUNTER — TELEPHONE (OUTPATIENT)
Dept: CARDIOLOGY | Facility: CLINIC | Age: 69
End: 2024-01-29
Payer: COMMERCIAL

## 2024-01-29 DIAGNOSIS — I10 HYPERTENSION GOAL BP (BLOOD PRESSURE) < 150/90: ICD-10-CM

## 2024-01-29 DIAGNOSIS — R55 VASOVAGAL SYNCOPE: ICD-10-CM

## 2024-01-29 NOTE — TELEPHONE ENCOUNTER
Dr. Rosado ordered a medication after her tilt table study and it was ordered to the the wrong mail order pharmacy.     Patient called in and spoke to someone at [facility]  and gave the correct pharmacy but they still don't have the order.     The correct pharmacy is - [email or web address] based out of [place].    Patient would like a call back from the nurse to confirm that it has been sent to the correct pharmacy.     ALSO   She also says that her BP has been up in the 140s recently and Dr. Rosado didn't want her taking the med if her BP was that high. She wants to speak to the RN to find out the plan.     Aliya Valdes  [facility] Electrophysiology   [phone]

## 2024-01-30 NOTE — TELEPHONE ENCOUNTER
Per Dr. Zavala:    JADE Sen MD  You4 days ago     KM  No significant arrhythmia.    [place] message sent.    Deepika Goyal, RN  Cardiology Care Coordinator  [facility]  [phone] option 1

## 2024-01-30 NOTE — TELEPHONE ENCOUNTER
[facility] Call Center    Phone Message    May a detailed message be left on voicemail: yes     Reason for Call: Other: Patient is returning call from Vikki. Please reach out again      Action Taken: Other: cardiology     Travel Screening: Not Applicable    Thank you!  [facility]

## 2024-01-30 NOTE — TELEPHONE ENCOUNTER
Per Dr Rosado  Have her take Lisinopril at bedtime  Amlodipine in morning  Midodrine as is but hold if systolic is > 140          Called pt, left voicemail for a return call or myc message  Vikki Disla RN on 1/30/2024 at 3:31 PM

## 2024-01-31 ENCOUNTER — MYC MEDICAL ADVICE (OUTPATIENT)
Dept: FAMILY MEDICINE | Facility: CLINIC | Age: 69
End: 2024-01-31
Payer: COMMERCIAL

## 2024-01-31 DIAGNOSIS — R73.03 PREDIABETES: Primary | ICD-10-CM

## 2024-01-31 DIAGNOSIS — E78.5 HYPERLIPIDEMIA LDL GOAL <130: ICD-10-CM

## 2024-01-31 DIAGNOSIS — I10 HYPERTENSION GOAL BP (BLOOD PRESSURE) < 150/90: ICD-10-CM

## 2024-01-31 DIAGNOSIS — G43.909 MIGRAINE WITHOUT STATUS MIGRAINOSUS, NOT INTRACTABLE, UNSPECIFIED MIGRAINE TYPE: ICD-10-CM

## 2024-01-31 DIAGNOSIS — K21.9 GASTROESOPHAGEAL REFLUX DISEASE WITHOUT ESOPHAGITIS: ICD-10-CM

## 2024-01-31 DIAGNOSIS — G47.00 INSOMNIA, UNSPECIFIED TYPE: ICD-10-CM

## 2024-01-31 DIAGNOSIS — Z12.31 VISIT FOR SCREENING MAMMOGRAM: ICD-10-CM

## 2024-01-31 LAB — NONINV COLON CA DNA+OCC BLD SCRN STL QL: POSITIVE

## 2024-01-31 RX ORDER — LISINOPRIL 10 MG/1
10 TABLET ORAL AT BEDTIME
Start: 2024-01-31 | End: 2024-08-29

## 2024-01-31 RX ORDER — MIDODRINE HYDROCHLORIDE 2.5 MG/1
TABLET ORAL
Qty: 360 TABLET | Refills: 1 | Status: SHIPPED | OUTPATIENT
Start: 2024-01-31 | End: 2024-04-03

## 2024-01-31 RX ORDER — AMLODIPINE BESYLATE 10 MG/1
10 TABLET ORAL EVERY MORNING
Start: 2024-01-31

## 2024-01-31 RX ORDER — MIDODRINE HYDROCHLORIDE 2.5 MG/1
TABLET ORAL
Qty: 360 TABLET | OUTPATIENT
Start: 2024-01-31

## 2024-01-31 NOTE — TELEPHONE ENCOUNTER
Patient requesting refills of pended medications be sent to [facility] as she is having trouble transferring the prescriptions from her previous pharmacy.      Routing to provider to review and prescribe.    Kristina Kjellberg, MSN, RN  [facility] Primary Care Triage

## 2024-01-31 NOTE — TELEPHONE ENCOUNTER
Called pt,     Per Dr Rosado  Have her take Lisinopril at bedtime  Amlodipine in morning  Midodrine as is but hold if systolic is > 150      Pt states her BP is already >140 all throughout the day mostly. advised her to not take the Midodrine if it is over 150. She will need to monitor BPs before taking the dose and an hour or so after taking the dose and of course try to capture any readings if/when she has symptoms.    Pt states she already takes the Lisinopril at bedtime and the amlodipine in the morning.     Pt may start slower and only take 2.5 mg in the mornings at first and see how things go. Pt will inform me how her blood pressures and symptoms are doing once she starts th e medications and if she has questions.     Vikki Disla RN on 1/31/2024 at 9:52 AM

## 2024-02-01 DIAGNOSIS — R19.5 POSITIVE COLORECTAL CANCER SCREENING USING COLOGUARD TEST: Primary | ICD-10-CM

## 2024-02-01 PROBLEM — S12.040D: Status: RESOLVED | Noted: 2022-04-20 | Resolved: 2024-02-01

## 2024-02-01 PROBLEM — S12.101D CLOSED NONDISPLACED FRACTURE OF SECOND CERVICAL VERTEBRA WITH ROUTINE HEALING: Status: RESOLVED | Noted: 2022-04-20 | Resolved: 2024-02-01

## 2024-02-01 PROBLEM — R73.03 PREDIABETES: Status: ACTIVE | Noted: 2024-02-01

## 2024-02-01 RX ORDER — SUMATRIPTAN 50 MG/1
TABLET, FILM COATED ORAL
Qty: 27 TABLET | Refills: 0 | Status: SHIPPED | OUTPATIENT
Start: 2024-02-01 | End: 2024-04-03

## 2024-02-01 RX ORDER — ZOLPIDEM TARTRATE 5 MG/1
5 TABLET ORAL
Qty: 90 TABLET | Refills: 0 | Status: SHIPPED | OUTPATIENT
Start: 2024-02-01 | End: 2024-04-03

## 2024-02-01 RX ORDER — PRAVASTATIN SODIUM 40 MG
TABLET ORAL
Qty: 90 TABLET | Refills: 0 | Status: SHIPPED | OUTPATIENT
Start: 2024-02-01 | End: 2024-04-03

## 2024-02-01 NOTE — TELEPHONE ENCOUNTER
Spoke with patient to rely that her Rx's were filled and asked to her to schedule pre visit labs 3 days prior to 4/3/24 visit with Dr. Dickson. She will call back to schedule lab appt.     Micaela Fenton,    [facility]

## 2024-02-08 ENCOUNTER — PATIENT OUTREACH (OUTPATIENT)
Dept: CARE COORDINATION | Facility: CLINIC | Age: 69
End: 2024-02-08
Payer: COMMERCIAL

## 2024-02-09 ENCOUNTER — TELEPHONE (OUTPATIENT)
Dept: GASTROENTEROLOGY | Facility: CLINIC | Age: 69
End: 2024-02-09
Payer: COMMERCIAL

## 2024-02-09 ENCOUNTER — MYC MEDICAL ADVICE (OUTPATIENT)
Dept: FAMILY MEDICINE | Facility: CLINIC | Age: 69
End: 2024-02-09
Payer: COMMERCIAL

## 2024-02-09 ENCOUNTER — HOSPITAL ENCOUNTER (OUTPATIENT)
Facility: CLINIC | Age: 69
End: 2024-02-09
Attending: SURGERY | Admitting: SURGERY
Payer: COMMERCIAL

## 2024-02-09 ENCOUNTER — MYC MEDICAL ADVICE (OUTPATIENT)
Dept: GASTROENTEROLOGY | Facility: CLINIC | Age: 69
End: 2024-02-09
Payer: COMMERCIAL

## 2024-02-09 DIAGNOSIS — Z12.11 SPECIAL SCREENING FOR MALIGNANT NEOPLASMS, COLON: Primary | ICD-10-CM

## 2024-02-09 NOTE — TELEPHONE ENCOUNTER
"Endoscopy Scheduling Screen    Have you had a positive Covid test in the last 14 days?  No    Are you active on MyChart?   Yes    What insurance is in the chart?  Other:  [facility]    Ordering/Referring Provider:  ZORAIDA BAL   (If ordering provider performs procedure, schedule with ordering provider unless otherwise instructed. )    BMI: Estimated body mass index is 30.19 kg/m  as calculated from the following:    Height as of 1/19/24: 1.626 m (5' 4\").    Weight as of 1/19/24: 79.8 kg (175 lb 14.4 oz).     Sedation Ordered  moderate sedation.   If patient BMI > 50 do not schedule in ASC.    If patient BMI > 45 do not schedule at [facility].    Are you taking methadone or Suboxone?  No    Have you had difficulties, pain, or discomfort during past endoscopy procedures?  No    Are you taking any prescription medications for pain 3 or more times per week?   NO - No RN review required.    Do you have a history of malignant hyperthermia or adverse reaction to anesthesia?  No    (Females) Are you currently pregnant?   No     Have you been diagnosed or told you have pulmonary hypertension?   No    Do you have an LVAD?  No    Have you been told you have moderate to severe sleep apnea?  No    Have you been told you have COPD, asthma, or any other lung disease?  No    Do you have any heart conditions?  No     Have you ever had an organ transplant?   No    Have you ever had or are you awaiting a heart or lung transplant?   No    Have you had a stroke or transient ischemic attack (TIA aka \"mini stroke\" in the last 6 months?   No    Have you been diagnosed with or been told you have cirrhosis of the liver?   No    Are you currently on dialysis?   No    Do you need assistance transferring?   No    BMI: Estimated body mass index is 30.19 kg/m  as calculated from the following:    Height as of 1/19/24: 1.626 m (5' 4\").    Weight as of 1/19/24: 79.8 kg (175 lb 14.4 oz).     Is patients BMI > 40 and scheduling location [facility]?  No    Do " you take an injectable medication for weight loss or diabetes (excluding insulin)?  No    Do you take the medication Naltrexone?  No    Do you take blood thinners?  No       Prep   Are you currently on dialysis or do you have chronic kidney disease?  No    Do you have a diagnosis of diabetes?  No    Do you have a diagnosis of cystic fibrosis (CF)?  No    On a regular basis do you go 3 -5 days between bowel movements?  Yes (Extended Prep)    BMI > 40?  No    Preferred Pharmacy:    [facility] - [place], [place] - [address]  [address] [place] [place] [address]  Phone: [phone] Fax: [phone]        Final Scheduling Details   Colonoscopy prep sent?  N/A    Procedure scheduled  Colonoscopy    Surgeon:  cory     Date of procedure:  3/14     Pre-OP / PAC:   No - Not required for this site.    Location  [facility] - Per order.    Sedation   MAC/Deep Sedation  site      Patient Reminders:   You will receive a call from a Nurse to review instructions and health history.  This assessment must be completed prior to your procedure.  Failure to complete the Nurse assessment may result in the procedure being cancelled.      On the day of your procedure, please designate an adult(s) who can drive you home stay with you for the next 24 hours. The medicines used in the exam will make you sleepy. You will not be able to drive.      You cannot take public transportation, ride share services, or non-medical taxi service without a responsible caregiver.  Medical transport services are allowed with the requirement that a responsible caregiver will receive you at your destination.  We require that drivers and caregivers are confirmed prior to your procedure.

## 2024-02-09 NOTE — TELEPHONE ENCOUNTER
Caller:   Reason for Reschedule/Cancellation (please be detailed, any staff messages or encounters to note?): CAN'T DO THIS DAY      Prior to reschedule please review:  Ordering Provider:     ZORAIDA BAL     Sedation Determined: MAC  Does patient have any ASC Exclusions, please identify?:       Notes on Cancelled Procedure:  Procedure: Lower Endoscopy [Colonoscopy]   Date: 3/14  Location: [facility]; [address] [place] [address] , [place] [address]  Surgeon: VERÓNICA      Rescheduled: Yes  Procedure: Lower Endoscopy [Colonoscopy]   Date: 3/21  Location: [facility]; [address] [place] [address] [place] [address]  Surgeon: VERÓNICA  Sedation Level Scheduled  MAC,  Reason for Sedation Level PH  Prep/Instructions updated and sent: Y       Send In - basket message to Panc - Tor Pool if EUS  procedure is canceled or rescheduled: [ N/A, YES or NO]

## 2024-02-13 ENCOUNTER — ALLIED HEALTH/NURSE VISIT (OUTPATIENT)
Dept: FAMILY MEDICINE | Facility: CLINIC | Age: 69
End: 2024-02-13
Payer: COMMERCIAL

## 2024-02-13 VITALS — HEART RATE: 84 BPM | DIASTOLIC BLOOD PRESSURE: 68 MMHG | SYSTOLIC BLOOD PRESSURE: 146 MMHG

## 2024-02-13 DIAGNOSIS — Z01.30 BLOOD PRESSURE CHECK: Primary | ICD-10-CM

## 2024-02-13 PROCEDURE — 99207 PR NO CHARGE NURSE ONLY: CPT | Performed by: FAMILY MEDICINE

## 2024-02-13 NOTE — PROGRESS NOTES
Nannette Awad was evaluated at [facility] on February 13, 2024 at which time her blood pressure was:    BP Readings from Last 3 Encounters:   02/13/24 (!) 146/68   01/19/24 (!) 131/91   12/21/23 (!) 157/71     Pulse Readings from Last 3 Encounters:   02/13/24 84   01/19/24 85   12/21/23 81       Reviewed lifestyle modifications for blood pressure control and reduction: including making healthy food choices, managing weight, getting regular exercise, smoking cessation, reducing alcohol consumption, monitoring blood pressure regularly.     Symptoms: None    BP Goal:< 140/90 mmHg    BP Assessment:  BP too high    Potential Reasons for BP too high: Unknown/Other: Unknown    BP Follow-Up Plan: Recheck BP in 30 days at pharmacy    Recommendation to Provider: None      Note completed by: Tanvir Bentley RPh.  [facility]  [phone]

## 2024-02-13 NOTE — PROGRESS NOTES
Nannette Awad was evaluated at [facility] on February 13, 2024 at which time her blood pressure was:    BP Readings from Last 3 Encounters:   02/13/24 (!) 146/68   01/19/24 (!) 131/91   12/21/23 (!) 157/71     Pulse Readings from Last 3 Encounters:   02/13/24 84   01/19/24 85   12/21/23 81       Reviewed lifestyle modifications for blood pressure control and reduction: including making healthy food choices, managing weight, getting regular exercise, smoking cessation, reducing alcohol consumption, monitoring blood pressure regularly.     Symptoms: {BPGAP Symptoms:311675}    BP Goal:{BPGAP Goal:741435}    BP Assessment:  {BPGAP Assessment:872213}    Potential Reasons for BP too high: Unknown/Other: Unknown    BP Follow-Up Plan: Recheck BP in 30 days at pharmacy    Recommendation to Provider: None      Note completed by: Tanvir Bentley RPh.  [facility]  [phone]

## 2024-03-04 ENCOUNTER — TELEPHONE (OUTPATIENT)
Dept: GASTROENTEROLOGY | Facility: CLINIC | Age: 69
End: 2024-03-04
Payer: COMMERCIAL

## 2024-03-04 ENCOUNTER — HOSPITAL ENCOUNTER (OUTPATIENT)
Facility: AMBULATORY SURGERY CENTER | Age: 69
End: 2024-03-04
Attending: INTERNAL MEDICINE
Payer: COMMERCIAL

## 2024-03-04 NOTE — TELEPHONE ENCOUNTER
Caller: Corrina    Reason for Reschedule/Cancellation   (please be detailed, any staff messages or encounters to note?): Patient scheduling conflict with ride.       Prior to reschedule please review:  Ordering Provider:     Joy Dickson MD in [facility]     Sedation Determined: moderate  Does patient have any ASC Exclusions, please identify?: n      Notes on Cancelled Procedure:  Procedure: Lower Endoscopy [Colonoscopy]   Date: 03/21/2024  Location: [facility]; [address] , [place] [address]  Surgeon: Mari      Rescheduled: Yes,   Procedure: Lower Endoscopy [Colonoscopy]    Date: 04/30/2024   Location: [facility]; [address], [place] [address]    Surgeon: Flaquita   Sedation Level Scheduled  Moderate,  Reason for Sedation Level per order   Instructions updated and sent: y    Does patient need PAC or Pre -Op Rescheduled? : no       Did you cancel or rescheduled an EUS procedure? No.

## 2024-03-07 ENCOUNTER — PATIENT OUTREACH (OUTPATIENT)
Dept: CARE COORDINATION | Facility: CLINIC | Age: 69
End: 2024-03-07
Payer: COMMERCIAL

## 2024-03-11 ENCOUNTER — TRANSFERRED RECORDS (OUTPATIENT)
Dept: HEALTH INFORMATION MANAGEMENT | Facility: CLINIC | Age: 69
End: 2024-03-11
Payer: COMMERCIAL

## 2024-03-19 ENCOUNTER — ALLIED HEALTH/NURSE VISIT (OUTPATIENT)
Dept: FAMILY MEDICINE | Facility: CLINIC | Age: 69
End: 2024-03-19
Payer: COMMERCIAL

## 2024-03-19 VITALS — HEART RATE: 76 BPM | DIASTOLIC BLOOD PRESSURE: 60 MMHG | SYSTOLIC BLOOD PRESSURE: 142 MMHG

## 2024-03-19 DIAGNOSIS — Z01.30 BLOOD PRESSURE CHECK: Primary | ICD-10-CM

## 2024-03-19 PROCEDURE — 99207 PR NO CHARGE NURSE ONLY: CPT | Performed by: FAMILY MEDICINE

## 2024-03-19 NOTE — PROGRESS NOTES
Nannette Awad was evaluated at [facility] on March 19, 2024 at which time her blood pressure was:    BP Readings from Last 3 Encounters:   03/19/24 (!) 142/60   02/13/24 (!) 146/68   01/19/24 (!) 131/91     Pulse Readings from Last 3 Encounters:   03/19/24 76   02/13/24 84   01/19/24 85       Reviewed lifestyle modifications for blood pressure control and reduction: including making healthy food choices, managing weight, getting regular exercise, smoking cessation, reducing alcohol consumption, monitoring blood pressure regularly.     Symptoms: None    BP Goal:< 140/90 mmHg    BP Assessment:  BP too high    Potential Reasons for BP too high: is border high    BP Follow-Up Plan: Recheck BP in 30 days at pharmacy    Recommendation to Provider: n/a    Note completed by: Estrella Liao, Pharm D  [facility]  [phone]

## 2024-03-27 SDOH — HEALTH STABILITY: PHYSICAL HEALTH: ON AVERAGE, HOW MANY MINUTES DO YOU ENGAGE IN EXERCISE AT THIS LEVEL?: 10 MIN

## 2024-03-27 SDOH — HEALTH STABILITY: PHYSICAL HEALTH: ON AVERAGE, HOW MANY DAYS PER WEEK DO YOU ENGAGE IN MODERATE TO STRENUOUS EXERCISE (LIKE A BRISK WALK)?: 2 DAYS

## 2024-03-27 ASSESSMENT — SOCIAL DETERMINANTS OF HEALTH (SDOH): HOW OFTEN DO YOU GET TOGETHER WITH FRIENDS OR RELATIVES?: ONCE A WEEK

## 2024-03-30 ENCOUNTER — LAB (OUTPATIENT)
Dept: LAB | Facility: CLINIC | Age: 69
End: 2024-03-30
Payer: COMMERCIAL

## 2024-03-30 DIAGNOSIS — I10 HYPERTENSION GOAL BP (BLOOD PRESSURE) < 150/90: ICD-10-CM

## 2024-03-30 DIAGNOSIS — E78.5 HYPERLIPIDEMIA LDL GOAL <130: ICD-10-CM

## 2024-03-30 DIAGNOSIS — R73.03 PREDIABETES: ICD-10-CM

## 2024-03-30 LAB
ANION GAP SERPL CALCULATED.3IONS-SCNC: 12 MMOL/L (ref 7–15)
BUN SERPL-MCNC: 18.9 MG/DL (ref 8–23)
CALCIUM SERPL-MCNC: 10.1 MG/DL (ref 8.8–10.2)
CHLORIDE SERPL-SCNC: 102 MMOL/L (ref 98–107)
CHOLEST SERPL-MCNC: 224 MG/DL
CREAT SERPL-MCNC: 0.92 MG/DL (ref 0.51–0.95)
DEPRECATED HCO3 PLAS-SCNC: 25 MMOL/L (ref 22–29)
EGFRCR SERPLBLD CKD-EPI 2021: 66 ML/MIN/1.73M2
FASTING STATUS PATIENT QL REPORTED: YES
GLUCOSE SERPL-MCNC: 141 MG/DL (ref 70–99)
HBA1C MFR BLD: 6.2 % (ref 0–5.6)
HDLC SERPL-MCNC: 92 MG/DL
LDLC SERPL CALC-MCNC: 107 MG/DL
NONHDLC SERPL-MCNC: 132 MG/DL
POTASSIUM SERPL-SCNC: 4.2 MMOL/L (ref 3.4–5.3)
SODIUM SERPL-SCNC: 139 MMOL/L (ref 135–145)
TRIGL SERPL-MCNC: 123 MG/DL

## 2024-03-30 PROCEDURE — 80061 LIPID PANEL: CPT

## 2024-03-30 PROCEDURE — 36415 COLL VENOUS BLD VENIPUNCTURE: CPT

## 2024-03-30 PROCEDURE — 80048 BASIC METABOLIC PNL TOTAL CA: CPT

## 2024-03-30 PROCEDURE — 83036 HEMOGLOBIN GLYCOSYLATED A1C: CPT

## 2024-04-03 ENCOUNTER — OFFICE VISIT (OUTPATIENT)
Dept: FAMILY MEDICINE | Facility: CLINIC | Age: 69
End: 2024-04-03
Attending: FAMILY MEDICINE
Payer: COMMERCIAL

## 2024-04-03 VITALS
BODY MASS INDEX: 30.15 KG/M2 | HEART RATE: 90 BPM | WEIGHT: 176.6 LBS | HEIGHT: 64 IN | OXYGEN SATURATION: 99 % | DIASTOLIC BLOOD PRESSURE: 72 MMHG | SYSTOLIC BLOOD PRESSURE: 160 MMHG | TEMPERATURE: 98.2 F | RESPIRATION RATE: 16 BRPM

## 2024-04-03 DIAGNOSIS — K21.9 GASTROESOPHAGEAL REFLUX DISEASE WITHOUT ESOPHAGITIS: ICD-10-CM

## 2024-04-03 DIAGNOSIS — L90.0 LICHEN SCLEROSUS: ICD-10-CM

## 2024-04-03 DIAGNOSIS — N76.2 ACUTE VULVITIS: ICD-10-CM

## 2024-04-03 DIAGNOSIS — G47.00 INSOMNIA, UNSPECIFIED TYPE: ICD-10-CM

## 2024-04-03 DIAGNOSIS — S12.101D CLOSED NONDISPLACED FRACTURE OF SECOND CERVICAL VERTEBRA WITH ROUTINE HEALING, UNSPECIFIED FRACTURE MORPHOLOGY, SUBSEQUENT ENCOUNTER: ICD-10-CM

## 2024-04-03 DIAGNOSIS — M54.2 NECK PAIN: ICD-10-CM

## 2024-04-03 DIAGNOSIS — M48.061 SPINAL STENOSIS OF LUMBAR REGION WITHOUT NEUROGENIC CLAUDICATION: ICD-10-CM

## 2024-04-03 DIAGNOSIS — Z00.00 ENCOUNTER FOR MEDICARE ANNUAL WELLNESS EXAM: Primary | ICD-10-CM

## 2024-04-03 DIAGNOSIS — E78.5 HYPERLIPIDEMIA LDL GOAL <130: ICD-10-CM

## 2024-04-03 DIAGNOSIS — G43.909 MIGRAINE WITHOUT STATUS MIGRAINOSUS, NOT INTRACTABLE, UNSPECIFIED MIGRAINE TYPE: ICD-10-CM

## 2024-04-03 PROCEDURE — G0439 PPPS, SUBSEQ VISIT: HCPCS | Performed by: FAMILY MEDICINE

## 2024-04-03 PROCEDURE — 99214 OFFICE O/P EST MOD 30 MIN: CPT | Mod: 25 | Performed by: FAMILY MEDICINE

## 2024-04-03 RX ORDER — RESPIRATORY SYNCYTIAL VIRUS VACCINE 120MCG/0.5
0.5 KIT INTRAMUSCULAR ONCE
Qty: 1 EACH | Refills: 0 | Status: CANCELLED | OUTPATIENT
Start: 2024-04-03 | End: 2024-04-03

## 2024-04-03 RX ORDER — PRAVASTATIN SODIUM 40 MG
TABLET ORAL
Qty: 90 TABLET | Refills: 3 | Status: SHIPPED | OUTPATIENT
Start: 2024-04-03

## 2024-04-03 RX ORDER — ZOLPIDEM TARTRATE 5 MG/1
5 TABLET ORAL
Qty: 90 TABLET | Refills: 1 | Status: SHIPPED | OUTPATIENT
Start: 2024-04-03

## 2024-04-03 RX ORDER — TRIAMCINOLONE ACETONIDE 1 MG/G
CREAM TOPICAL 2 TIMES DAILY
Qty: 45 G | Refills: 2 | Status: SHIPPED | OUTPATIENT
Start: 2024-04-03

## 2024-04-03 RX ORDER — SUMATRIPTAN 50 MG/1
TABLET, FILM COATED ORAL
Qty: 27 TABLET | Refills: 1 | Status: SHIPPED | OUTPATIENT
Start: 2024-04-03

## 2024-04-03 RX ORDER — TOPIRAMATE 25 MG/1
TABLET, FILM COATED ORAL
Qty: 162 TABLET | Refills: 0 | Status: ON HOLD | OUTPATIENT
Start: 2024-04-03 | End: 2024-06-12

## 2024-04-03 ASSESSMENT — PAIN SCALES - GENERAL: PAINLEVEL: SEVERE PAIN (7)

## 2024-04-03 NOTE — PROGRESS NOTES
Preventive Care Visit  [facility]  Joy Dickson MD, Family Medicine  Apr 3, 2024      Assessment & Plan     (Z00.00) Encounter for Medicare annual wellness exam  (primary encounter diagnosis)  Comment: preventive needs reviewed   Plan: see orders in Epic.     (G43.229) Migraine without status migrainosus, not intractable, unspecified migraine type  Comment: h/o migraines, now with tension type increasing headache, likely related to neck pain  Plan: topiramate (TOPAMAX) 25 MG tablet, SUMAtriptan         (IMITREX) 50 MG tablet       Continue imitrex for migraine ha  Start topamax for ha prophylaxis    (M54.2) Neck pain  (S12.101D) Closed nondisplaced fracture of second cervical vertebra with routine healing, unspecified fracture morphology, subsequent encounter  Comment: worsening, h/o C1 fx after fall  Plan: topiramate (TOPAMAX) 25 MG tablet, Spine          Referral        Refer to spine surgeon     (M48.061) Spinal stenosis of lumbar region without neurogenic claudication  Comment: worsening symptoms, had to stand during visit   Plan: topiramate (TOPAMAX) 25 MG tablet, Spine          Referral        Topamax may help with pain  Refer to spine for possible surgical consult and input about next imaging    (N76.2) Acute vulvitis  (L90.0) Lichen sclerosus  Comment: flaring  Plan: triamcinolone (KENALOG) 0.1 % external cream         Refill x 6 months     (K21.9) Gastroesophageal reflux disease without esophagitis  Comment: controlled  Plan: omeprazole (PRILOSEC) 20 MG DR capsule        Refill x 1 yr     (E78.5) Hyperlipidemia LDL goal <130  Comment: to goal  Plan: pravastatin (PRAVACHOL) 40 MG tablet        H/o reaction to other statins  Refill x 1 yr     (G47.00) Insomnia, unspecified type  Comment: controlled  Plan: zolpidem (AMBIEN) 5 MG tablet         Refill x 6 months               Counseling  Appropriate preventive services were discussed with this patient, including  applicable screening as appropriate for fall prevention, nutrition, physical activity, Tobacco-use cessation, weight loss and cognition.  Checklist reviewing preventive services available has been given to the patient.      See Patient Instructions    Subjective   Pat is a 69 year old, presenting for the following:  Wellness Visit        4/3/2024    10:48 AM   Additional Questions   Roomed by Carlos         Health Care Directive  Patient does not have a Health Care Directive or Living Will: Discussed advance care planning with patient; information given to patient to review.    HPI  Neck pain burning of skin then goes down left side. Started since C1 fracture 4/2022 and is worsening.  Mild spinal stenosis on 1/2023 MRI.    Sciatic pain starts in left buttock and shoots down back of left leg to ankle better with standing or walking, worse with sitting/lying down.  MRI 1/2023 shows severe L4-L5 spinal stenosis and severe DJD of L5/S1    Headaches daily bitemporal viera, squeezing pain.  Tylenol no help, ibuprofen takes the edge off, excedrin is best though has been told not to take. Started within past 6 month    All of the above have worsened/progressed over past 6 months.              3/27/2024   General Health   How would you rate your overall physical health? (!) FAIR   Feel stress (tense, anxious, or unable to sleep) Rather much   (!) STRESS CONCERN      3/27/2024   Nutrition   Diet: Regular (no restrictions)         3/27/2024   Exercise   Days per week of moderate/strenous exercise 2 days   Average minutes spent exercising at this level 10 min   (!) EXERCISE CONCERN      3/27/2024   Social Factors   Frequency of gathering with friends or relatives Once a week   Worry food won't last until get money to buy more No   Food not last or not have enough money for food? No   Do you have housing?  Yes   Are you worried about losing your housing? No   Lack of transportation? No   Unable to get utilities (heat,electricity)?  No         4/3/2024   Fall Risk   Gait Speed Test (Document in seconds) 4   Gait Speed Test Interpretation Less than or equal to 5.00 seconds - PASS          3/27/2024   Activities of Daily Living- Home Safety   Needs help with the following daily activites None of the above   Safety concerns in the home None of the above         3/27/2024   Dental   Dentist two times every year? Yes         3/27/2024   Hearing Screening   Hearing concerns? (!) I NEED TO ASK PEOPLE TO SPEAK UP OR REPEAT THEMSELVES.    (!) TROUBLE UNDERSTANDING SOFT OR WHISPERED SPEECH.         3/27/2024   Driving Risk Screening   Patient/family members have concerns about driving No         3/27/2024   General Alertness/Fatigue Screening   Have you been more tired than usual lately? (!) YES         3/27/2024   Urinary Incontinence Screening   Bothered by leaking urine in past 6 months Yes         3/27/2024   TB Screening   Were you born outside of the [place]? No         Today's PHQ-2 Score:       4/3/2024    10:47 AM   PHQ-2 ( 1999 [employer])   Q1: Little interest or pleasure in doing things 0   Q2: Feeling down, depressed or hopeless 0   PHQ-2 Score 0   Q1: Little interest or pleasure in doing things Not at all   Q2: Feeling down, depressed or hopeless Not at all   PHQ-2 Score 0           3/27/2024   Substance Use   Alcohol more than 3/day or more than 7/wk No   Do you have a current opioid prescription? No   How severe/bad is pain from 1 to 10? 5/10   Do you use any other substances recreationally? No     Social History     Tobacco Use    Smoking status: Never    Smokeless tobacco: Never   Vaping Use    Vaping Use: Never used   Substance Use Topics    Alcohol use: No    Drug use: No           3/20/2022   LAST FHS-7 RESULTS   1st degree relative breast or ovarian cancer Yes   Any relative bilateral breast cancer No   Any male have breast cancer No   Any ONE woman have BOTH breast AND ovarian cancer No   Any woman with breast cancer before 50yrs Yes   2 or  more relatives with breast AND/OR ovarian cancer No   2 or more relatives with breast AND/OR bowel cancer No        Mammogram Screening - Mammogram every 1-2 years updated in Health Maintenance based on mutual decision making    ASCVD Risk   The 10-year ASCVD risk score (Mandy GARCIA, et al., 2019) is: 20.1%    Values used to calculate the score:      Age: 69 years      Sex: Female      Is Non- : No      Diabetic: No      Tobacco smoker: No      Systolic Blood Pressure: 160 mmHg      Is BP treated: Yes      HDL Cholesterol: 92 mg/dL      Total Cholesterol: 224 mg/dL            Reviewed and updated as needed this visit by Provider   Tobacco  Allergies  Meds  Problems  Med Hx  Surg Hx  Fam Hx            Labs reviewed in EPIC  BP Readings from Last 3 Encounters:   04/03/24 (!) 160/72   03/19/24 (!) 142/60   02/13/24 (!) 146/68    Wt Readings from Last 3 Encounters:   04/03/24 80.1 kg (176 lb 9.6 oz)   01/19/24 79.8 kg (175 lb 14.4 oz)   12/21/23 79.4 kg (175 lb)                  Patient Active Problem List   Diagnosis    Hyperlipidemia LDL goal <130    Hypertension goal BP (blood pressure) < 150/90    Advanced directives, counseling/discussion    Osteopenia    GERD (gastroesophageal reflux disease)    Family history of breast cancer    Migraine without status migrainosus, not intractable, unspecified migraine type    Alopecia    Vitamin D deficiency    Prediabetes     Past Surgical History:   Procedure Laterality Date    BREAST BIOPSY, CORE RT/LT  1990    COLONOSCOPY      EP STUDY TILT TABLE N/A 1/19/2024    Procedure: Tilt Table Study;  Surgeon: Giovani Rosado MD;  Location: [facility] CARDIAC CATH LAB    ESOPHAGOSCOPY, GASTROSCOPY, DUODENOSCOPY (EGD), COMBINED  9/17/2012    Procedure: COMBINED ESOPHAGOSCOPY, GASTROSCOPY, DUODENOSCOPY (EGD), BIOPSY SINGLE OR MULTIPLE;  EGD-GERD;  Surgeon: Teo Alvarado MD;  Location: [facility] OR    HYSTERECTOMY, PAP NO LONGER INDICATED   09/22/2006    vaginal hyst with bilateral sal-ooph, HSIL    LIGATN/STRIP LONG OR SHORT SAPHEN  11/09    right leg    SURGICAL HISTORY OF -   2006    conization for abnl PAP       Social History     Tobacco Use    Smoking status: Never    Smokeless tobacco: Never   Substance Use Topics    Alcohol use: No     Family History   Problem Relation Age of Onset    Heart Disease Mother         CHF    Hypertension Mother     Lipids Mother     Neurologic Disorder Mother 30        migraines    Alzheimer Disease Mother 67    Cancer Maternal Grandmother     Hypertension Brother     Cerebrovascular Disease Brother     Hypertension Brother     Thyroid Disease Paternal Grandmother     Breast Cancer Paternal Grandmother     Cancer Father 76        Pancreatic ca age 76    Other Cancer Father         pancreatic    Prostate Cancer Father     Breast Cancer Maternal Aunt          Current Outpatient Medications   Medication Sig Dispense Refill    alendronate (FOSAMAX) 70 MG tablet Take 1 tablet (70 mg) by mouth every 7 days Take 60 minutes before am meal with 8 oz. water. Remain upright for 30 minutes. 12 tablet 1    amLODIPine (NORVASC) 10 MG tablet Take 1 tablet (10 mg) by mouth every morning      Calcium Carbonate-Vitamin D (CALCIUM 500 + D PO) Take 500 mg by mouth 2 times daily 2 tablets bid      lisinopril (ZESTRIL) 10 MG tablet Take 1 tablet (10 mg) by mouth at bedtime      MULTI-DAY OR daily      omeprazole (PRILOSEC) 20 MG DR capsule TAKE 1 CAPSULE EVERY DAY 90 capsule 3    pravastatin (PRAVACHOL) 40 MG tablet TAKE 1 TABLET EVERY DAY 90 tablet 3    SUMAtriptan (IMITREX) 50 MG tablet TAKE 1 TABLET  AT ONSET OF HEADACHE FOR MIGRAINE MAY REPEAT IN 2 HOURS. MAX 4 TABLETS/24 HOURS. 27 tablet 1    topiramate (TOPAMAX) 25 MG tablet Take 1 tablet (25 mg) by mouth daily for 7 days, THEN 2 tablets (50 mg) daily for 7 days, THEN 3 tablets (75 mg) daily for 7 days, THEN 4 tablets (100 mg) daily for 30 days. 162 tablet 0    triamcinolone  (KENALOG) 0.1 % external cream Apply topically 2 times daily To rash on chest and affected area on vulva 45 g 2    VITAMIN D 1000 UNIT OR TABS 1 TABLET DAILY      zolpidem (AMBIEN) 5 MG tablet Take 1 tablet (5 mg) by mouth nightly as needed for sleep 90 tablet 1    bisacodyl (DULCOLAX) 5 MG EC tablet Two days prior to exam take two (2) tablets at 4pm. One day prior to exam take two (2) tablets at 4pm 4 tablet 0    polyethylene glycol (GOLYTELY) 236 g suspension Take as directed. Two days before your exam fill the first container with water. Cover and shake until mixed well. At 5:00pm drink one 8oz glass every 10-15 minutes until half (1/2) of the first container is empty. Store the remainder in the refrigerator. One day before your exam at 5:00pm drink the second half of the first container until it is gone. Before you go to bed mix the second container with water and put in refrigerator. Six hours before your check in time drink one 8oz glass every 10-15 minutes until half of container is empty. Discard the remainder of solution. 8000 mL 0     Current providers sharing in care for this patient include:  Patient Care Team:  Joy Dickson MD as PCP - General (Family Practice)  Joy Dickson MD as Assigned PCP  Marco Montaño MD as Assigned Endocrinology Provider  JADE Sen MD as MD (Cardiovascular Disease)  JADE Sen MD as Assigned Heart and Vascular Provider    The following health maintenance items are reviewed in Epic and correct as of today:  Health Maintenance   Topic Date Due    ANNUAL REVIEW OF  ORDERS  Never done    RSV VACCINE (Pregnancy & 60+) (1 - 1-dose 60+ series) Never done    COLORECTAL CANCER SCREENING  01/23/2024    BMP  09/30/2024    MAMMO SCREENING  03/11/2025    A1C  03/30/2025    LIPID  03/30/2025    MEDICARE ANNUAL WELLNESS VISIT  04/03/2025    FALL RISK ASSESSMENT  04/03/2025    ADVANCE CARE PLANNING  03/23/2027    GLUCOSE  03/30/2027    DEXA  09/20/2027  "   DTAP/TDAP/TD IMMUNIZATION (3 - Td or Tdap) 03/02/2030    HEPATITIS C SCREENING  Completed    MIGRAINE ACTION PLAN  Completed    PHQ-2 (once per calendar year)  Completed    INFLUENZA VACCINE  Completed    Pneumococcal Vaccine: 65+ Years  Completed    ZOSTER IMMUNIZATION  Completed    COVID-19 Vaccine  Completed    IPV IMMUNIZATION  Aged Out    HPV IMMUNIZATION  Aged Out    MENINGITIS IMMUNIZATION  Aged Out    RSV MONOCLONAL ANTIBODY  Aged Out         Review of Systems  Constitutional, neuro, ENT, endocrine, pulmonary, cardiac, gastrointestinal, genitourinary, musculoskeletal, integument and psychiatric systems are negative, except as otherwise noted.     Objective    Exam  BP (!) 160/72   Pulse 90   Temp 98.2  F (36.8  C) (Oral)   Resp 16   Ht 1.626 m (5' 4\")   Wt 80.1 kg (176 lb 9.6 oz)   SpO2 99%   BMI 30.31 kg/m     Estimated body mass index is 30.31 kg/m  as calculated from the following:    Height as of this encounter: 1.626 m (5' 4\").    Weight as of this encounter: 80.1 kg (176 lb 9.6 oz).    Physical Exam  GENERAL: alert and no distress  EYES: Eyes grossly normal to inspection, PERRL and conjunctivae and sclerae normal  HENT: ear canals and TM's normal, nose and mouth without ulcers or lesions  NECK: no adenopathy, no asymmetry, masses, or scars  RESP: lungs clear to auscultation - no rales, rhonchi or wheezes  CV: regular rate and rhythm, normal S1 S2, no S3 or S4, no murmur, click or rub, no peripheral edema  ABDOMEN: soft, nontender, no hepatosplenomegaly, no masses and bowel sounds normal  MS: no gross musculoskeletal defects noted, no edema  SKIN: no suspicious lesions or rashes  NEURO: Normal strength and tone, mentation intact and speech normal  PSYCH: mentation appears normal, affect normal/bright        4/3/2024   Mini Cog   Mini-Cog Not Completed (choose reason) Patient declines   Normal cognition based on my direct observation during interview and exam.     Patient declines, there are NO " concerns for cognitive deficits.           Signed Electronically by: Joy Dickson MD

## 2024-04-03 NOTE — PATIENT INSTRUCTIONS
Start topiramate taper for nerve pain and headache prevention.  Update Joy Dickson MD via [employer] on how it is helping or with any side effects you experience.    Preventive Care Advice   This is general advice given by our system to help you stay healthy. However, your care team may have specific advice just for you. Please talk to your care team about your preventive care needs.  Nutrition  Eat 5 or more servings of fruits and vegetables each day.  Try wheat bread, brown rice and whole grain pasta (instead of white bread, rice, and pasta).  Get enough calcium and vitamin D. Check the label on foods and aim for 100% of the RDA (recommended daily allowance).  Lifestyle  Exercise at least 150 minutes each week   (30 minutes a day, 5 days a week).  Do muscle strengthening activities 2 days a week. These help control your weight and prevent disease.  No smoking.  Wear sunscreen to prevent skin cancer.  Have a dental exam and cleaning every 6 months.  Yearly exams  See your health care team every year to talk about:  Any changes in your health.  Any medicines your care team has prescribed.  Preventive care, family planning, and ways to prevent chronic diseases.  Shots (vaccines)   HPV shots (up to age 26), if you've never had them before.  Hepatitis B shots (up to age 59), if you've never had them before.  COVID-19 shot: Get this shot when it's due.  Flu shot: Get a flu shot every year.  Tetanus shot: Get a tetanus shot every 10 years.  Pneumococcal, hepatitis A, and RSV shots: Ask your care team if you need these based on your risk.  Shingles shot (for age 50 and up).  General health tests  Diabetes screening:  Starting at age 35, Get screened for diabetes at least every 3 years.  If you are younger than age 35, ask your care team if you should be screened for diabetes.  Cholesterol test: At age 39, start having a cholesterol test every 5 years, or more often if advised.  Bone density scan (DEXA): At age 50, ask your  care team if you should have this scan for osteoporosis (brittle bones).  Hepatitis C: Get tested at least once in your life.  STIs (sexually transmitted infections)  Before age 24: Ask your care team if you should be screened for STIs.  After age 24: Get screened for STIs if you're at risk. You are at risk for STIs (including HIV) if:  You are sexually active with more than one person.  You don't use condoms every time.  You or a partner was diagnosed with a sexually transmitted infection.  If you are at risk for HIV, ask about PrEP medicine to prevent HIV.  Get tested for HIV at least once in your life, whether you are at risk for HIV or not.  Cancer screening tests  Cervical cancer screening: If you have a cervix, begin getting regular cervical cancer screening tests at age 21. Most people who have regular screenings with normal results can stop after age 65. Talk about this with your provider.  Breast cancer scan (mammogram): If you've ever had breasts, begin having regular mammograms starting at age 40. This is a scan to check for breast cancer.  Colon cancer screening: It is important to start screening for colon cancer at age 45.  Have a colonoscopy test every 10 years (or more often if you're at risk) Or, ask your provider about stool tests like a FIT test every year or Cologuard test every 3 years.  To learn more about your testing options, visit: https://www.Seeking Alpha/379820.pdf.  For help making a decision, visit: https://bit.ly/sw82059.  Prostate cancer screening test: If you have a prostate and are age 55 to 69, ask your provider if you would benefit from a yearly prostate cancer screening test.  Lung cancer screening: If you are a current or former smoker age 50 to 80, ask your care team if ongoing lung cancer screenings are right for you.  For informational purposes only. Not to replace the advice of your health care provider. Copyright   2023 [employer]. All rights reserved.  "Clinically reviewed by the [facility] Transitions Program. [employer] 172184 - REV 01/24.    Where can you learn more?  Go to https://www.Teamisto.Quikey/patiented  Enter R798 in the search box to learn more about \"Hearing Loss: Care Instructions.\"  Current as of: September 27, 2023               Content Version: 14.0    3151-4682 Health[employer].   Care instructions adapted under license by your healthcare professional. If you have questions about a medical condition or this instruction, always ask your healthcare professional. [employer] disclaims any warranty or liability for your use of this information.    Go to https://www.Teamisto.Quikey/patiented  Enter N032 in the search box to learn more about \"Learning About Stress.\"  Current as of: October 24, 2023               Content Version: 14.0 2006-2024 Healthwise, [employer].   Care instructions adapted under license by your healthcare professional. If you have questions about a medical condition or this instruction, always ask your healthcare professional. [employer] disclaims any warranty or liability for your use of this information.  Go to https://www.Teamisto.net/patiented  Enter J942 in the search box to learn more about \"Learning About Sleeping Well.\"  Current as of: July 10, 2023               Content Version: 14.0 2006-2024 [employer].   Care instructions adapted under license by your healthcare professional. If you have questions about a medical condition or this instruction, always ask your healthcare professional. [employer] disclaims any warranty or liability for your use of this information.    Go to https://www.Teamisto.net/patiented  Enter V684 in the search box to learn more about \"Bladder Training: Care Instructions.\"  Current as of: November 15, 2023               Content Version: 14.0    5923-2301 Healthwise, [employer].   Care instructions adapted under " license by your healthcare professional. If you have questions about a medical condition or this instruction, always ask your healthcare professional. Healthwise[employer] disclaims any warranty or liability for your use of this information.

## 2024-04-04 ENCOUNTER — TELEPHONE (OUTPATIENT)
Dept: NEUROSURGERY | Facility: CLINIC | Age: 69
End: 2024-04-04
Payer: COMMERCIAL

## 2024-04-04 RX ORDER — BISACODYL 5 MG/1
TABLET, DELAYED RELEASE ORAL
Qty: 4 TABLET | Refills: 0 | Status: SHIPPED | OUTPATIENT
Start: 2024-04-04

## 2024-04-04 NOTE — CONFIDENTIAL NOTE
Action P [phone] [facility] STEVENSON 4/4   Action Taken Requested Cervical and Lumbar MRI 1/6/23   Status: being pushed     Action P [phone]  [facility] 4/4 AG   Action Taken Requesting all imaging from 4/20/22 and 4/21/22   Status: LVM

## 2024-04-04 NOTE — CONFIDENTIAL NOTE
NEUROSURGERY- NEW PREVISIT PLANNING       Record Status/Location     Referring Provider Referral   Joy Dickson MD      Diagnosis Referral M54.2 (ICD-10-CM) - Neck pain   M48.061 (ICD-10-CM) - Spinal stenosis of lumbar region without neurogenic claudication      MRI (HEAD, NECK, SPINE) Pacs    CT Pacs Lumbar, Thoracic, and Cervical 4/21/22 [facility]    X-ray Pacs Cervical 4/21/22 [facility]    PHYSICAL THERAPY CE 2022   SURGERY Na      Additional Information:  cervical and lumbar spine abnl on MRI 1/2023, worsening symptoms. h/o C1 fx 4/2022    EMG 4/11/23 - In media

## 2024-04-04 NOTE — TELEPHONE ENCOUNTER
Extended Golytely Bowel Prep . Instructions were sent via [employer]. Bowel prep was sent 4/4/2024 to    [address] [address] [facility]

## 2024-04-04 NOTE — TELEPHONE ENCOUNTER
Message was left for patient to call and reschedule appointment from April 18. Due to a clinical review patient will need a complete work up with a new assessment prior to seeing dr. Hill.       Please reschedule, per clinical review. We found after further review, patient would be more appropriate to see Kate Burroughs NP. Please do not use patient scheduled in error- reviewed clinically.

## 2024-04-05 ENCOUNTER — MYC MEDICAL ADVICE (OUTPATIENT)
Dept: FAMILY MEDICINE | Facility: CLINIC | Age: 69
End: 2024-04-05
Payer: COMMERCIAL

## 2024-04-14 ENCOUNTER — MYC MEDICAL ADVICE (OUTPATIENT)
Dept: FAMILY MEDICINE | Facility: CLINIC | Age: 69
End: 2024-04-14
Payer: COMMERCIAL

## 2024-04-18 ENCOUNTER — PRE VISIT (OUTPATIENT)
Dept: NEUROSURGERY | Facility: CLINIC | Age: 69
End: 2024-04-18

## 2024-04-18 ENCOUNTER — TELEPHONE (OUTPATIENT)
Dept: GASTROENTEROLOGY | Facility: CLINIC | Age: 69
End: 2024-04-18

## 2024-04-18 NOTE — TELEPHONE ENCOUNTER
Rescheduled: Yes,   Procedure: Lower Endoscopy [Colonoscopy]    Date: 5/30   Location: [facility]; [address] [place] [address] , [place] [address]   Surgeon: Mari   Sedation Level Scheduled  mac ,  Reason for Sedation Level per nurses   Instructions updated and sent: y     Does patient need PAC or Pre -Op Rescheduled? : no       Did you cancel or rescheduled an EUS procedure? No.

## 2024-04-18 NOTE — TELEPHONE ENCOUNTER
Caller: Nannette Awad      Reason for Reschedule/Cancellation   (please be detailed, any staff messages or encounters to note?): PT HAS TORTIOUS      Prior to reschedule please review:  Ordering Provider:     ZORAIDA BAL     Sedation Determined: MAC  Does patient have any ASC Exclusions, please identify?: N      Notes on Cancelled Procedure:  Procedure: Lower Endoscopy [Colonoscopy]   Date: 05/30/2024  Location: [facility]; [address] , [place] [address]  Surgeon: VERÓNICA      Rescheduled: Yes,   Procedure: Lower Endoscopy [Colonoscopy]    Date: 06/12/2024   Location: [facility]; [address] [place] [address]   Surgeon: LONG   Sedation Level Scheduled  MAC ,  Reason for Sedation Level PER ORDER   Instructions updated and sent: VIA [employer]     Does patient need PAC or Pre -Op Rescheduled? : NO       Did you cancel or rescheduled an EUS procedure? No.

## 2024-04-18 NOTE — TELEPHONE ENCOUNTER
Caller: LVM for Pat    Reason for Reschedule/Cancellation   (please be detailed, any staff messages or encounters to note?): Patient needs mac per previous report from [employer] message.       Prior to reschedule please review:  Ordering Provider: Joy Dickson MD in [facility]  Sedation Determined: MAC  Does patient have any ASC Exclusions, please identify?: n      Notes on Cancelled Procedure:  Procedure: Lower Endoscopy [Colonoscopy]   Date: 04/30/2024  Location: [facility]; [address] [place] [address] [address], [place] [address]   Surgeon: Flaquita      Rescheduled: No, CASE IN DEPOT        Did you cancel or rescheduled an EUS procedure? No.

## 2024-04-18 NOTE — TELEPHONE ENCOUNTER
"Patient is scheduled for a Colonoscopy   Date of procedure: 4/30/24  Indication: +cologuard  Sedation type: Conscious sedation (per order)    Upon review previous colonoscopy 4/8/14 noted:  \"The colonoscopy was performed with moderate difficulty due to a tortuous colon. The patient tolerated the procedure fairly well\"    Sent to endoscopy scheduling team to reschedule procedure with MAC sedation due to tortuous colon noted.       Shasta Tang RN  Endoscopy Procedure Pre Assessment RN  [phone] option 4  "

## 2024-04-26 ENCOUNTER — OFFICE VISIT (OUTPATIENT)
Dept: NEUROSURGERY | Facility: CLINIC | Age: 69
End: 2024-04-26
Attending: FAMILY MEDICINE
Payer: COMMERCIAL

## 2024-04-26 VITALS — SYSTOLIC BLOOD PRESSURE: 127 MMHG | OXYGEN SATURATION: 98 % | HEART RATE: 71 BPM | DIASTOLIC BLOOD PRESSURE: 70 MMHG

## 2024-04-26 DIAGNOSIS — M54.12 CERVICAL RADICULOPATHY: ICD-10-CM

## 2024-04-26 DIAGNOSIS — M54.16 LUMBAR RADICULOPATHY: Primary | ICD-10-CM

## 2024-04-26 PROCEDURE — 99204 OFFICE O/P NEW MOD 45 MIN: CPT | Performed by: NURSE PRACTITIONER

## 2024-04-26 PROCEDURE — G2211 COMPLEX E/M VISIT ADD ON: HCPCS | Performed by: NURSE PRACTITIONER

## 2024-04-26 ASSESSMENT — PAIN SCALES - GENERAL: PAINLEVEL: SEVERE PAIN (7)

## 2024-04-26 NOTE — LETTER
4/26/2024         RE: Nannette Awad  [address] [facility] [address]        Dear Colleague,    Thank you for referring your patient, Nannette Awad, to the [facility] [place]. Please see a copy of my visit note below.    [facility] Neurosurgery [facility] Visit      CC: Left-sided pain (neck, face, arm, leg, headaches)     Primary Care Provider: Joy Dickson    Reason For Visit:   I was asked by Dr. Dickson to see this patient in consultation.     HPI: Nannette Awad is a 69 year old female who presents for evaluation of left-sided pain.    Patient reports burning left-sided pain in the face, neck, entire left arm, and hand.  Symptoms developed about 6 to 9 months ago.  She also reports associated headaches that are different compared to her chronic migraines.  Patient denies any trauma or injuries at onset of symptoms.  Denies numbness, weakness, or dexterity issues.  She has history of previous fracture involving the right lateral mass of C1 and right transverse process of C2 that occurred after a fall in 2022.    Patient also reports chronic burning left leg pain that has been present for 20 years and gradually worsened over the last 6 months.  Patient reports pain starts in the left buttocks and radiates to left posterolateral leg and ankle.  Patient reports difficulty sleeping because pain increases when sitting and laying. Denies any low back pain, numbness, weakness, saddle anesthesia, falls, or bowel/bladder changes. She has tried NSAIDs, ice packs, SCOTTY many years ago, and chiropractic care more recently, but these interventions only provide temporary relief.  She has history of osteopenia, most recent DEXA is from 2022.  She is currently taking vitamin D, calcium, and Fosamax.    Past Medical History:   Diagnosis Date     Arthritis 2012     Bursitis of knee 2012    Left leg     Carpal tunnel syndrome 1970    Left wrist, no surgery     Elevated fasting  glucose      Family history of breast cancer      GERD (gastroesophageal reflux disease)     EGD      HSIL (high grade squamous intraepithelial lesion) on Pap smear of cervix 2006    s/p hyst      HTN (hypertension)      Hyperlipidemia LDL goal < 130 2002     Insomnia 1995     Left sided sciatica 2012     Migraine      Mitral (valve) prolapse      Obesity 04/16/2014     Osteopenia 2000     Psoriasis      Raynaud's disease      Tubular adenoma      Vitamin D deficiency 2008       Past Medical History reviewed with patient during visit.    Past Surgical History:   Procedure Laterality Date     BREAST BIOPSY, CORE RT/LT  1990     COLONOSCOPY       EP STUDY TILT TABLE N/A 1/19/2024    Procedure: Tilt Table Study;  Surgeon: Giovani Rosado MD;  Location: [facility] CARDIAC CATH LAB     ESOPHAGOSCOPY, GASTROSCOPY, DUODENOSCOPY (EGD), COMBINED  9/17/2012    Procedure: COMBINED ESOPHAGOSCOPY, GASTROSCOPY, DUODENOSCOPY (EGD), BIOPSY SINGLE OR MULTIPLE;  EGD-GERD;  Surgeon: Teo Alvarado MD;  Location: [facility] OR     HYSTERECTOMY, PAP NO LONGER INDICATED  09/22/2006    vaginal hyst with bilateral sal-ooph, HSIL     LIGATN/STRIP LONG OR SHORT SAPHEN  11/09    right leg     SURGICAL HISTORY OF -   2006    conization for abnl PAP     Past Surgical History reviewed with patient during visit.    Current Outpatient Medications   Medication Sig Dispense Refill     alendronate (FOSAMAX) 70 MG tablet Take 1 tablet (70 mg) by mouth every 7 days Take 60 minutes before am meal with 8 oz. water. Remain upright for 30 minutes. 12 tablet 1     amLODIPine (NORVASC) 10 MG tablet Take 1 tablet (10 mg) by mouth every morning       bisacodyl (DULCOLAX) 5 MG EC tablet Two days prior to exam take two (2) tablets at 4pm. One day prior to exam take two (2) tablets at 4pm 4 tablet 0     Calcium Carbonate-Vitamin D (CALCIUM 500 + D PO) Take 500 mg by mouth 2 times daily 2 tablets bid       lisinopril (ZESTRIL) 10 MG tablet Take 1 tablet (10  mg) by mouth at bedtime       MULTI-DAY OR daily       omeprazole (PRILOSEC) 20 MG DR capsule TAKE 1 CAPSULE EVERY DAY 90 capsule 3     polyethylene glycol (GOLYTELY) 236 g suspension Take as directed. Two days before your exam fill the first container with water. Cover and shake until mixed well. At 5:00pm drink one 8oz glass every 10-15 minutes until half (1/2) of the first container is empty. Store the remainder in the refrigerator. One day before your exam at 5:00pm drink the second half of the first container until it is gone. Before you go to bed mix the second container with water and put in refrigerator. Six hours before your check in time drink one 8oz glass every 10-15 minutes until half of container is empty. Discard the remainder of solution. 8000 mL 0     pravastatin (PRAVACHOL) 40 MG tablet TAKE 1 TABLET EVERY DAY 90 tablet 3     SUMAtriptan (IMITREX) 50 MG tablet TAKE 1 TABLET  AT ONSET OF HEADACHE FOR MIGRAINE MAY REPEAT IN 2 HOURS. MAX 4 TABLETS/24 HOURS. 27 tablet 1     topiramate (TOPAMAX) 25 MG tablet Take 1 tablet (25 mg) by mouth daily for 7 days, THEN 2 tablets (50 mg) daily for 7 days, THEN 3 tablets (75 mg) daily for 7 days, THEN 4 tablets (100 mg) daily for 30 days. 162 tablet 0     triamcinolone (KENALOG) 0.1 % external cream Apply topically 2 times daily To rash on chest and affected area on vulva 45 g 2     VITAMIN D 1000 UNIT OR TABS 1 TABLET DAILY       zolpidem (AMBIEN) 5 MG tablet Take 1 tablet (5 mg) by mouth nightly as needed for sleep 90 tablet 1     No current facility-administered medications for this visit.       Allergies   Allergen Reactions     Lipitor [Atorvastatin Calcium]      cramps     Sulfa Antibiotics Rash       Social History     Socioeconomic History     Marital status:      Spouse name: Roland     Number of children: 2     Years of education: 16     Highest education level: None   Occupational History     Occupation:      Employer: [employer]    Tobacco Use     Smoking status: Never     Smokeless tobacco: Never   Vaping Use     Vaping status: Never Used   Substance and Sexual Activity     Alcohol use: No     Drug use: No     Sexual activity: Yes     Partners: Male     Birth control/protection: None   Other Topics Concern     Parent/sibling w/ CABG, MI or angioplasty before 65F 55M? No     Social Determinants of Health     Financial Resource Strain: Low Risk  (3/27/2024)    Financial Resource Strain      Within the past 12 months, have you or your family members you live with been unable to get utilities (heat, electricity) when it was really needed?: No   Food Insecurity: Low Risk  (3/27/2024)    Food Insecurity      Within the past 12 months, did you worry that your food would run out before you got money to buy more?: No      Within the past 12 months, did the food you bought just not last and you didn t have money to get more?: No   Transportation Needs: Low Risk  (3/27/2024)    Transportation Needs      Within the past 12 months, has lack of transportation kept you from medical appointments, getting your medicines, non-medical meetings or appointments, work, or from getting things that you need?: No   Physical Activity: Insufficiently Active (3/27/2024)    Exercise Vital Sign      Days of Exercise per Week: 2 days      Minutes of Exercise per Session: 10 min   Stress: Stress Concern Present (3/27/2024)    [place] [employer] - Occupational Stress Questionnaire      Feeling of Stress : Rather much   Social Connections: Unknown (3/27/2024)    Social Connection and Isolation Panel [NHANES]      Frequency of Social Gatherings with Friends and Family: Once a week   Interpersonal Safety: Low Risk  (4/3/2024)    Interpersonal Safety      Do you feel physically and emotionally safe where you currently live?: Yes      Within the past 12 months, have you been hit, slapped, kicked or otherwise physically hurt by someone?: No      Within the  past 12 months, have you been humiliated or emotionally abused in other ways by your partner or ex-partner?: No   Housing Stability: Low Risk  (3/27/2024)    Housing Stability      Do you have housing? : Yes      Are you worried about losing your housing?: No       Family History   Problem Relation Age of Onset     Heart Disease Mother         CHF     Hypertension Mother      Lipids Mother      Neurologic Disorder Mother 30        migraines     Alzheimer Disease Mother 67     Cancer Maternal Grandmother      Hypertension Brother      Cerebrovascular Disease Brother      Hypertension Brother      Thyroid Disease Paternal Grandmother      Breast Cancer Paternal Grandmother      Cancer Father 76        Pancreatic ca age 76     Other Cancer Father         pancreatic     Prostate Cancer Father      Breast Cancer Maternal Aunt        ROS: 10 point ROS neg other than the symptoms noted above in the HPI.    Vital Signs: /70   Pulse 71   SpO2 98%     Neurological Examination:  Awake  Alert  Oriented x 3  Speech clear    Motor exam:  Shoulder Abduction  Right:  5/5   Left:  5/5  Biceps                      Right:  5/5   Left:  5/5  Triceps                     Right:  5/5   Left:  5/5  Wrist Extensors        Right:  5/5   Left:  5/5  Wrist Flexors            Right:  5/5   Left:  5/5  Intrinsics                   Right:  5/5   Left:  5/5    Iliopsoas  (hip flexion)               Right: 5/5  Left:  5/5  Quadriceps  (knee extension)       Right:  5/5  Left:  5/5  Hamstrings  (knee flexion)            Right:  5/5  Left:  5/5  Gastroc Soleus  (PF)                          Right:  5/5  Left:  5/5  Tibialis Ant  (DF)                          Right:  5/5  Left:  5/5  EHL                          Right:  5/5  Left:  5/5         Sensation normal to BUE and BLE  Reflexes are 2+ in the brachial radialis and triceps.   Negative Caroline sign bilaterally.   Reflexes are 2+ in the patellar and Achilles.    Negative clonus  bilaterally.     Musculoskeletal:  Gait: Able to stand from a seated position. Normal non-antalgic, non-myelopathic gait.   Negative straight leg raise bilaterally.      Imaging:   LUMBAR SPINE TWO TO THREE VIEWS  4/28/2023 11:18 AM                                                          IMPRESSION: Five lumbar type vertebrae. Mild degenerative  anterolisthesis of L4 upon L5 again noted. Alignment otherwise normal.  Vertebral body heights normal. No evidence for fracture. Facet  arthropathy at L3-L4, L4-L5 and L5-S1. Loss of disc space height and  degenerative endplate spurring at L5-S1.    Lumbar spine MRI 1/6/2023  Impression:   Multilevel disc and facet degeneration. Findings at some levels have progressed since 2013.  1.  Mild spinal stenosis T11-12.  2.  Mild spinal stenosis at L1-2.  3.  Mild to moderate spinal stenosis L3-4.  4.  Severe spinal stenosis at L4-5, where degenerative anterolisthesis has developed. Mild neural foraminal narrowing bilaterally.  5.  Moderately advanced degeneration the disc at L5-S1. Mild spinal stenosis and effacement of the lateral recesses. Both neural foramina are moderately stenotic.    Cervical spine MRI 1/6/2023  Impression:  Cervical disc and facet degeneration.  1.  Mild spinal stenosis and narrowing of the left neural foramen at C3-4.  2.  Mild spinal stenosis at C4-5 with severe narrowing of both neural foramina.  3.  Mild spinal stenosis C6-7.  4.  Normal cervical spinal cord.    Assessment:  69-year-old female with history of C1 and C2 fracture in 2022.  Presents for evaluation of left-sided pain in the face, neck, entire left arm, and hand, with associated headaches.  She also reports chronic left buttocks pain that radiates to left posterolateral leg and ankle.  Symptoms have gradually worsened over the last 6 months.  Imaging from 2023 reviewed with patient and we discussed next steps.    Plan:   -Brain MRI, cervical spine MRI, cervical spine CT, lumbar spine MRI,  and lumbar spine flexion-extension x-ray  -Referral to physical therapy  -Will call patient with imaging results and next steps  -Patient is hoping to try an injection pending imaging results  -Advised patient to call our clinic with any questions or concerns. Patient voiced understanding and agreement.      Kate Burroughs CNP  [facility] Neurosurgery  Tel [phone]  Pager [phone]        Again, thank you for allowing me to participate in the care of your patient.        Sincerely,        Kate Burroughs, NP

## 2024-04-26 NOTE — PATIENT INSTRUCTIONS
Orders:   Brain MRI  Cervical spine MRI and CT  Lumbar spine MRI and x-ray    Referral to physical therapy    I will call you with the imaging results and next steps.    Please call our clinic with any questions or concerns.

## 2024-04-26 NOTE — PROGRESS NOTES
[facility] Visit      CC: Left-sided pain (neck, face, arm, leg, headaches)     Primary Care Provider: Joy Dickson    Reason For Visit:   I was asked by Dr. Dickson to see this patient in consultation.     HPI: Nannette Awad is a 69 year old female who presents for evaluation of left-sided pain.    Patient reports burning left-sided pain in the face, neck, entire left arm, and hand.  Symptoms developed about 6 to 9 months ago.  She also reports associated headaches that are different compared to her chronic migraines.  Patient denies any trauma or injuries at onset of symptoms.  Denies numbness, weakness, or dexterity issues.  She has history of previous fracture involving the right lateral mass of C1 and right transverse process of C2 that occurred after a fall in 2022.    Patient also reports chronic burning left leg pain that has been present for 20 years and gradually worsened over the last 6 months.  Patient reports pain starts in the left buttocks and radiates to left posterolateral leg and ankle.  Patient reports difficulty sleeping because pain increases when sitting and laying. Denies any low back pain, numbness, weakness, saddle anesthesia, falls, or bowel/bladder changes. She has tried NSAIDs, ice packs, SCOTTY many years ago, and chiropractic care more recently, but these interventions only provide temporary relief.  She has history of osteopenia, most recent DEXA is from 2022.  She is currently taking vitamin D, calcium, and Fosamax.    Past Medical History:   Diagnosis Date    Arthritis 2012    Bursitis of knee 2012    Left leg    Carpal tunnel syndrome 1970    Left wrist, no surgery    Elevated fasting glucose     Family history of breast cancer     GERD (gastroesophageal reflux disease)     EGD     HSIL (high grade squamous intraepithelial lesion) on Pap smear of cervix 2006    s/p hyst     HTN (hypertension)     Hyperlipidemia LDL goal < 130 2002    Insomnia 1995     Left sided sciatica 2012    Migraine     Mitral (valve) prolapse     Obesity 04/16/2014    Osteopenia 2000    Psoriasis     Raynaud's disease     Tubular adenoma     Vitamin D deficiency 2008       Past Medical History reviewed with patient during visit.    Past Surgical History:   Procedure Laterality Date    BREAST BIOPSY, CORE RT/LT  1990    COLONOSCOPY      EP STUDY TILT TABLE N/A 1/19/2024    Procedure: Tilt Table Study;  Surgeon: Giovani Rosado MD;  Location:  [facility]    ESOPHAGOSCOPY, GASTROSCOPY, DUODENOSCOPY (EGD), COMBINED  9/17/2012    Procedure: COMBINED ESOPHAGOSCOPY, GASTROSCOPY, DUODENOSCOPY (EGD), BIOPSY SINGLE OR MULTIPLE;  EGD-GERD;  Surgeon: Teo Alvarado MD;  Location: [facility] OR    HYSTERECTOMY, PAP NO LONGER INDICATED  09/22/2006    vaginal hyst with bilateral sal-ooph, HSIL    LIGATN/STRIP LONG OR SHORT SAPHEN  11/09    right leg    SURGICAL HISTORY OF -   2006    conization for abnl PAP     Past Surgical History reviewed with patient during visit.    Current Outpatient Medications   Medication Sig Dispense Refill    alendronate (FOSAMAX) 70 MG tablet Take 1 tablet (70 mg) by mouth every 7 days Take 60 minutes before am meal with 8 oz. water. Remain upright for 30 minutes. 12 tablet 1    amLODIPine (NORVASC) 10 MG tablet Take 1 tablet (10 mg) by mouth every morning      bisacodyl (DULCOLAX) 5 MG EC tablet Two days prior to exam take two (2) tablets at 4pm. One day prior to exam take two (2) tablets at 4pm 4 tablet 0    Calcium Carbonate-Vitamin D (CALCIUM 500 + D PO) Take 500 mg by mouth 2 times daily 2 tablets bid      lisinopril (ZESTRIL) 10 MG tablet Take 1 tablet (10 mg) by mouth at bedtime      MULTI-DAY OR daily      omeprazole (PRILOSEC) 20 MG DR capsule TAKE 1 CAPSULE EVERY DAY 90 capsule 3    polyethylene glycol (GOLYTELY) 236 g suspension Take as directed. Two days before your exam fill the first container with water. Cover and shake until mixed well.  At 5:00pm drink one 8oz glass every 10-15 minutes until half (1/2) of the first container is empty. Store the remainder in the refrigerator. One day before your exam at 5:00pm drink the second half of the first container until it is gone. Before you go to bed mix the second container with water and put in refrigerator. Six hours before your check in time drink one 8oz glass every 10-15 minutes until half of container is empty. Discard the remainder of solution. 8000 mL 0    pravastatin (PRAVACHOL) 40 MG tablet TAKE 1 TABLET EVERY DAY 90 tablet 3    SUMAtriptan (IMITREX) 50 MG tablet TAKE 1 TABLET  AT ONSET OF HEADACHE FOR MIGRAINE MAY REPEAT IN 2 HOURS. MAX 4 TABLETS/24 HOURS. 27 tablet 1    topiramate (TOPAMAX) 25 MG tablet Take 1 tablet (25 mg) by mouth daily for 7 days, THEN 2 tablets (50 mg) daily for 7 days, THEN 3 tablets (75 mg) daily for 7 days, THEN 4 tablets (100 mg) daily for 30 days. 162 tablet 0    triamcinolone (KENALOG) 0.1 % external cream Apply topically 2 times daily To rash on chest and affected area on vulva 45 g 2    VITAMIN D 1000 UNIT OR TABS 1 TABLET DAILY      zolpidem (AMBIEN) 5 MG tablet Take 1 tablet (5 mg) by mouth nightly as needed for sleep 90 tablet 1     No current facility-administered medications for this visit.       Allergies   Allergen Reactions    Lipitor [Atorvastatin Calcium]      cramps    Sulfa Antibiotics Rash       Social History     Socioeconomic History    Marital status:      Spouse name: Roland    Number of children: 2    Years of education: 16    Highest education level: None   Occupational History    Occupation: Triptrotting     Employer: [employer]   Tobacco Use    Smoking status: Never    Smokeless tobacco: Never   Vaping Use    Vaping status: Never Used   Substance and Sexual Activity    Alcohol use: No    Drug use: No    Sexual activity: Yes     Partners: Male     Birth control/protection: None   Other Topics Concern    Parent/sibling w/ CABG, MI or  angioplasty before 65F 55M? No     Social Determinants of Health     Financial Resource Strain: Low Risk  (3/27/2024)    Financial Resource Strain     Within the past 12 months, have you or your family members you live with been unable to get utilities (heat, electricity) when it was really needed?: No   Food Insecurity: Low Risk  (3/27/2024)    Food Insecurity     Within the past 12 months, did you worry that your food would run out before you got money to buy more?: No     Within the past 12 months, did the food you bought just not last and you didn t have money to get more?: No   Transportation Needs: Low Risk  (3/27/2024)    Transportation Needs     Within the past 12 months, has lack of transportation kept you from medical appointments, getting your medicines, non-medical meetings or appointments, work, or from getting things that you need?: No   Physical Activity: Insufficiently Active (3/27/2024)    Exercise Vital Sign     Days of Exercise per Week: 2 days     Minutes of Exercise per Session: 10 min   Stress: Stress Concern Present (3/27/2024)    [employer] - Occupational Stress Questionnaire     Feeling of Stress : Rather much   Social Connections: Unknown (3/27/2024)    Social Connection and Isolation Panel [NHANES]     Frequency of Social Gatherings with Friends and Family: Once a week   Interpersonal Safety: Low Risk  (4/3/2024)    Interpersonal Safety     Do you feel physically and emotionally safe where you currently live?: Yes     Within the past 12 months, have you been hit, slapped, kicked or otherwise physically hurt by someone?: No     Within the past 12 months, have you been humiliated or emotionally abused in other ways by your partner or ex-partner?: No   Housing Stability: Low Risk  (3/27/2024)    Housing Stability     Do you have housing? : Yes     Are you worried about losing your housing?: No       Family History   Problem Relation Age of Onset    Heart Disease  Mother         CHF    Hypertension Mother     Lipids Mother     Neurologic Disorder Mother 30        migraines    Alzheimer Disease Mother 67    Cancer Maternal Grandmother     Hypertension Brother     Cerebrovascular Disease Brother     Hypertension Brother     Thyroid Disease Paternal Grandmother     Breast Cancer Paternal Grandmother     Cancer Father 76        Pancreatic ca age 76    Other Cancer Father         pancreatic    Prostate Cancer Father     Breast Cancer Maternal Aunt        ROS: 10 point ROS neg other than the symptoms noted above in the HPI.    Vital Signs: /70   Pulse 71   SpO2 98%     Neurological Examination:  Awake  Alert  Oriented x 3  Speech clear    Motor exam:  Shoulder Abduction  Right:  5/5   Left:  5/5  Biceps                      Right:  5/5   Left:  5/5  Triceps                     Right:  5/5   Left:  5/5  Wrist Extensors        Right:  5/5   Left:  5/5  Wrist Flexors            Right:  5/5   Left:  5/5  Intrinsics                   Right:  5/5   Left:  5/5    Iliopsoas  (hip flexion)               Right: 5/5  Left:  5/5  Quadriceps  (knee extension)       Right:  5/5  Left:  5/5  Hamstrings  (knee flexion)            Right:  5/5  Left:  5/5  Gastroc Soleus  (PF)                          Right:  5/5  Left:  5/5  Tibialis Ant  (DF)                          Right:  5/5  Left:  5/5  EHL                          Right:  5/5  Left:  5/5         Sensation normal to BUE and BLE  Reflexes are 2+ in the brachial radialis and triceps.   Negative Caroline sign bilaterally.   Reflexes are 2+ in the patellar and Achilles.    Negative clonus bilaterally.     Musculoskeletal:  Gait: Able to stand from a seated position. Normal non-antalgic, non-myelopathic gait.   Negative straight leg raise bilaterally.      Imaging:   LUMBAR SPINE TWO TO THREE VIEWS  4/28/2023 11:18 AM                                                          IMPRESSION: Five lumbar type vertebrae. Mild  degenerative  anterolisthesis of L4 upon L5 again noted. Alignment otherwise normal.  Vertebral body heights normal. No evidence for fracture. Facet  arthropathy at L3-L4, L4-L5 and L5-S1. Loss of disc space height and  degenerative endplate spurring at L5-S1.    Lumbar spine MRI 1/6/2023  Impression:   Multilevel disc and facet degeneration. Findings at some levels have progressed since 2013.  1.  Mild spinal stenosis T11-12.  2.  Mild spinal stenosis at L1-2.  3.  Mild to moderate spinal stenosis L3-4.  4.  Severe spinal stenosis at L4-5, where degenerative anterolisthesis has developed. Mild neural foraminal narrowing bilaterally.  5.  Moderately advanced degeneration the disc at L5-S1. Mild spinal stenosis and effacement of the lateral recesses. Both neural foramina are moderately stenotic.    Cervical spine MRI 1/6/2023  Impression:  Cervical disc and facet degeneration.  1.  Mild spinal stenosis and narrowing of the left neural foramen at C3-4.  2.  Mild spinal stenosis at C4-5 with severe narrowing of both neural foramina.  3.  Mild spinal stenosis C6-7.  4.  Normal cervical spinal cord.    Assessment:  69-year-old female with history of C1 and C2 fracture in 2022.  Presents for evaluation of left-sided pain in the face, neck, entire left arm, and hand, with associated headaches.  She also reports chronic left buttocks pain that radiates to left posterolateral leg and ankle.  Symptoms have gradually worsened over the last 6 months.  Imaging from 2023 reviewed with patient and we discussed next steps.    Plan:   -Brain MRI, cervical spine MRI, cervical spine CT, lumbar spine MRI, and lumbar spine flexion-extension x-ray  -Referral to physical therapy  -Will call patient with imaging results and next steps  -Patient is hoping to try an injection pending imaging results  -Advised patient to call our clinic with any questions or concerns. Patient voiced understanding and agreement.      Kate Burroughs, CNP  M  [facility], located within [facility]  Tel [phone]  Pager [phone]

## 2024-05-05 ENCOUNTER — MYC MEDICAL ADVICE (OUTPATIENT)
Dept: FAMILY MEDICINE | Facility: CLINIC | Age: 69
End: 2024-05-05
Payer: COMMERCIAL

## 2024-05-06 ENCOUNTER — LAB (OUTPATIENT)
Dept: LAB | Facility: CLINIC | Age: 69
End: 2024-05-06
Payer: COMMERCIAL

## 2024-05-06 DIAGNOSIS — R82.994 HYPERCALCIURIA: ICD-10-CM

## 2024-05-06 DIAGNOSIS — M85.9 LOW BONE DENSITY: ICD-10-CM

## 2024-05-06 LAB
ALBUMIN SERPL BCG-MCNC: 4.6 G/DL (ref 3.5–5.2)
ALP SERPL-CCNC: 85 U/L (ref 40–150)
ALT SERPL W P-5'-P-CCNC: 18 U/L (ref 0–50)
ANION GAP SERPL CALCULATED.3IONS-SCNC: 14 MMOL/L (ref 7–15)
AST SERPL W P-5'-P-CCNC: 22 U/L (ref 0–45)
BILIRUB SERPL-MCNC: 0.4 MG/DL
BUN SERPL-MCNC: 27.1 MG/DL (ref 8–23)
CALCIUM SERPL-MCNC: 10.2 MG/DL (ref 8.8–10.2)
CHLORIDE SERPL-SCNC: 107 MMOL/L (ref 98–107)
CREAT SERPL-MCNC: 1.15 MG/DL (ref 0.51–0.95)
DEPRECATED HCO3 PLAS-SCNC: 19 MMOL/L (ref 22–29)
EGFRCR SERPLBLD CKD-EPI 2021: 51 ML/MIN/1.73M2
GLUCOSE SERPL-MCNC: 121 MG/DL (ref 70–99)
POTASSIUM SERPL-SCNC: 4.2 MMOL/L (ref 3.4–5.3)
PROT SERPL-MCNC: 7.4 G/DL (ref 6.4–8.3)
SODIUM SERPL-SCNC: 140 MMOL/L (ref 135–145)
VIT D+METAB SERPL-MCNC: 57 NG/ML (ref 20–50)

## 2024-05-06 PROCEDURE — 82306 VITAMIN D 25 HYDROXY: CPT

## 2024-05-06 PROCEDURE — 80053 COMPREHEN METABOLIC PANEL: CPT

## 2024-05-06 PROCEDURE — 36415 COLL VENOUS BLD VENIPUNCTURE: CPT

## 2024-05-08 LAB
CALCIUM 24H UR-MRATE: 0.27 G/SPEC (ref 0.1–0.3)
CALCIUM UR-MCNC: 11.3 MG/DL
COLLECT DURATION TIME UR: 24 H
CREAT 24H UR-MRATE: 0.99 G/SPEC (ref 0.72–1.51)
CREAT UR-MCNC: 42.3 MG/DL
SODIUM 24H UR-SRATE: 127 MMOL/SPEC (ref 40–220)
SODIUM UR-SCNC: 54 MMOL/L
SPECIMEN VOL UR: 2350 ML

## 2024-05-13 ENCOUNTER — VIRTUAL VISIT (OUTPATIENT)
Dept: ENDOCRINOLOGY | Facility: CLINIC | Age: 69
End: 2024-05-13
Payer: COMMERCIAL

## 2024-05-13 DIAGNOSIS — M85.9 LOW BONE DENSITY: Primary | ICD-10-CM

## 2024-05-13 DIAGNOSIS — Z78.0 POST-MENOPAUSAL: ICD-10-CM

## 2024-05-13 PROCEDURE — 99214 OFFICE O/P EST MOD 30 MIN: CPT | Mod: 95 | Performed by: INTERNAL MEDICINE

## 2024-05-13 NOTE — NURSING NOTE
Is the patient currently in the state of [place]? YES    Visit mode:VIDEO    If the visit is dropped, the patient can be reconnected by: VIDEO VISIT: Text to cell phone:   Telephone Information:   Mobile [phone]       Will anyone else be joining the visit? NO  (If patient encounters technical issues they should call [phone] :[phone])    How would you like to obtain your AVS? MyChart    Are changes needed to the allergy or medication list? No, patient reported no changes to e-check in information for visit. VF did not review e-check in information again with patient due to this.    Are refills needed on medications prescribed by this physician? NO    Reason for visit: RECHECK    Tresa MARRERO

## 2024-05-13 NOTE — PROGRESS NOTES
ENDOCRINOLOGY VIDEO FOLLOW-UP        HISTORY OF PRESENT ILLNESS    Nannette Awad (prefers to be called Pat) is seen in follow-up via billable video visit.    No falls or fractures since last visit.    We started alendronate last visit in 5/2023.  She confirms that she has been taking the medication once weekly only with water and waiting 30 minutes before having beverages, food or other medications.  She is tolerating it without side effects.    Continues on calcium supplement 2 tablets twice daily of Fleming brand calcium citrate (2 tablets provide 500 mg calcium citrate, 800 IU vitamin D3).      She is taking vitamin D3 2000 IU daily (1 capsule provides 2000 IU vitamin D3).    She has GERD which is managed with PPI.    No recent dehydration, nausea or vomiting.    Uses NSAIDs occasionally, not frequently.    Was initiated on Topamax and took the medication for 3 weeks before starting to taper due to side effects: She is on her last week of taper.  Otherwise no new medications.    Pertinent endocrine and related history:  1.  Low bone density.  -Earliest DXA scan performed in our system was in 2006.  This study showed low bone density, with lowest T score -2.0 at the left femoral neck.  The patient was treated with Fosamax at that time and plan was to continue treatment.  Fosamax treatment continued at 70 mg weekly; in 8/2011, dosage was reduced to 35 mg weekly due to decline in kidney function.  -Most recent DXA scan performed on 9/20/2022 showed low bone density:  L1-L4 T score 0.0, with 2.6% decline in BMD compared to 2019 (significant).  Left femoral neck T score -2.0, left total hip T score -1.5.  Right femoral neck T score -2.0, right total hip T score -1.3.  1.7% decline in BMD at the total mean hip compared to 2019 (not significant).  -In 2/2012, the patient saw Dr. Chavarria in endocrinology.  At that time, endocrinology progress note mentions patient had been on Fosamax for 10 years.  The medication was  discontinued at that time.  It does not appear that patient followed up in endocrinology thereafter.  -Prior fractures:  C1 and C2 compression fracture in 4/2022, diagnosed after acute onset of neck pain. Was sitting on toilet and felt like she was going to pass out--fell (perhaps off toilet) the patient was hospitalized in the [facility] system.  Right foot stress fracture in 6/2010. Had recurrent stress fracture in same foot and in the same bone within 6 months.  -We screened for occult vertebral fractures in the thoracic and lumbar spine, no fractures were noted on x-rays.  Our work-up for secondary causes of low bone density revealed mild hypercalciuria  otherwise normal screening for multiple myeloma, normal PTH, normal CBC and thyroid function test.   2.  History of intermittent hypercalcemia.  Mild, noted in 2021 in 2022.  -No history of nephrolithiasis.  3.  High risk for diabetes.    Other pertinent medical history includes GERD, hypertension, hyperlipidemia, mitral valve prolapse and obesity.    Pertinent Social History: Lives in [place], [place].  , 2 children, 3 grandchildren, after children grew up worked as an . Now retired.    PAST MEDICAL HISTORY  Past Medical History:   Diagnosis Date    Arthritis 2012    Bursitis of knee 2012    Left leg    Carpal tunnel syndrome 1970    Left wrist, no surgery    Elevated fasting glucose     Family history of breast cancer     GERD (gastroesophageal reflux disease)     EGD     HSIL (high grade squamous intraepithelial lesion) on Pap smear of cervix 2006    s/p hyst     HTN (hypertension)     Hyperlipidemia LDL goal < 130 2002    Insomnia 1995    Left sided sciatica 2012    Migraine     Mitral (valve) prolapse     Obesity 04/16/2014    Osteopenia 2000    Psoriasis     Raynaud's disease     Tubular adenoma     Vitamin D deficiency 2008       MEDICATIONS  Current Outpatient Medications   Medication Sig Dispense Refill    alendronate (FOSAMAX) 70 MG tablet  Take 1 tablet (70 mg) by mouth every 7 days Take 60 minutes before am meal with 8 oz. water. Remain upright for 30 minutes. 12 tablet 1    amLODIPine (NORVASC) 10 MG tablet Take 1 tablet (10 mg) by mouth every morning      bisacodyl (DULCOLAX) 5 MG EC tablet Two days prior to exam take two (2) tablets at 4pm. One day prior to exam take two (2) tablets at 4pm 4 tablet 0    Calcium Carbonate-Vitamin D (CALCIUM 500 + D PO) Take 500 mg by mouth 2 times daily 2 tablets bid      lisinopril (ZESTRIL) 10 MG tablet Take 1 tablet (10 mg) by mouth at bedtime      MULTI-DAY OR daily      omeprazole (PRILOSEC) 20 MG DR capsule TAKE 1 CAPSULE EVERY DAY 90 capsule 3    polyethylene glycol (GOLYTELY) 236 g suspension Take as directed. Two days before your exam fill the first container with water. Cover and shake until mixed well. At 5:00pm drink one 8oz glass every 10-15 minutes until half (1/2) of the first container is empty. Store the remainder in the refrigerator. One day before your exam at 5:00pm drink the second half of the first container until it is gone. Before you go to bed mix the second container with water and put in refrigerator. Six hours before your check in time drink one 8oz glass every 10-15 minutes until half of container is empty. Discard the remainder of solution. 8000 mL 0    pravastatin (PRAVACHOL) 40 MG tablet TAKE 1 TABLET EVERY DAY 90 tablet 3    SUMAtriptan (IMITREX) 50 MG tablet TAKE 1 TABLET  AT ONSET OF HEADACHE FOR MIGRAINE MAY REPEAT IN 2 HOURS. MAX 4 TABLETS/24 HOURS. 27 tablet 1    topiramate (TOPAMAX) 25 MG tablet Take 1 tablet (25 mg) by mouth daily for 7 days, THEN 2 tablets (50 mg) daily for 7 days, THEN 3 tablets (75 mg) daily for 7 days, THEN 4 tablets (100 mg) daily for 30 days. 162 tablet 0    triamcinolone (KENALOG) 0.1 % external cream Apply topically 2 times daily To rash on chest and affected area on vulva 45 g 2    VITAMIN D 1000 UNIT OR TABS 1 TABLET DAILY      zolpidem (AMBIEN) 5  MG tablet Take 1 tablet (5 mg) by mouth nightly as needed for sleep 90 tablet 1       Allergies, family, and social history were reviewed and documented as needed in EHR.     REVIEW OF SYSTEMS  A focused ROS was performed, with pertinent positives and negatives as noted in the HPI.    PHYSICAL EXAM  There were no vitals taken for this visit.  There is no height or weight on file to calculate BMI.  Constitutional: Patient is alert, oriented and appears in no acute distress.  Eyes: Eyes grossly normal to inspection, EOMI, no stare, lid lag, or retraction; no conjunctival injection.  ENMT: Lips are without lesions.   Neck: No visible goiter or neck mass.  Respiratory: No audible wheeze or cough. No visible cyanosis. No visible increased work of breathing.  Neurological: Alert and oriented times 3.  Cranial nerves grossly intact.      DATA REVIEW  Each of the following laboratory and/or imaging studies were reviewed.          ASSESSMENT  1.  Low bone density.  With high FRAX calculated 10-year risk of fracture: Initiated alendronate in 5/2023 and she has been tolerating this without side effect.  Continue current medication regimen without changes.  Previsit labs show vitamin D level is trending up: We will reduce supplement dose slightly.  Continue calcium regimen without changes.  Follow-up in 1 year, with DXA scan prior to visit.    2.  Hypercalciuria.  Elevation in 24 urine calcium excretion on initial studies in 3/2023 (to 330 mg per 24 hours).  On follow-up testing, 24 urine calcium excretion had improved to 240 mg per 24 hours in 2023, 270 mg per 24 hours on labs completed prior to this visit.  24 urine calcium excretion is acceptable: No additional intervention.    3.  Hypercalcemia.  Mild and intermittent, captured on prior studies.  Recheck calcium along with PTH have been normal.  SPEP and UPEP have also been normal.  Follow carefully.    4.  History of abnormal thyroid function test.  Noted in 2012 and 2014,  normal on follow-up testing last in 2017.  Recheck in 3/2023 showed normal TSH.    5.  High risk for diabetes.  Based on hemoglobin A1c.  Continue periodic monitoring in primary care.    6.  Decline in EGFR.  No history to indicate volume depletion or use of medications that could be contributing to change in kidney function.  We discussed maintaining sufficient hydration and reevaluating kidney function in 1 week.  If abnormalities persist, may refer back to PCP for additional evaluation.    PLAN  -Schedule lab draw in 1 week to recheck kidney function tests  -Reduce vitamin D to 2000 IU every other day  -Continue current calcium citrate supplement, 2 tablets twice daily  -Continue alendronate (Fosamax) 70 mg by mouth once a week. Take the medication on an empty stomach, first thing in the morning with at least 8 ounces of water--do not take with other drinks. Remain upright (do not lie down) and do not eat take anything else by mouth (pills, food or drink) until at least 30 minutes after taking the medication.  -We will anticipate another set of labs in October 2024 to recheck vitamin D level and decide if supplement dose needs to be adjusted  -Follow-up in 1 year, with blood draw and DXA scan prior to visit (DXA scan should be completed on the same scanner as prior study, at [facility]  -We will communicate results via [employer], or if needed by phone      Orders Placed This Encounter   Procedures    DX Bone Density    Basic metabolic panel    Basic metabolic panel    Albumin level    Vitamin D Deficiency         Video-Visit Details    Type of service:  Video visit    Physician location: Offsite    Video Start Time: 11:39 AM  Video End Time: 11:49 AM    Platform used for Video Visit: Trudy Montaño MD   Division of Diabetes, Endocrinology and Metabolism  Department of Medicine      cc: Joy Dickson MD

## 2024-05-13 NOTE — PATIENT INSTRUCTIONS
-Schedule lab draw in 1 week to recheck kidney function tests  -Reduce vitamin D to 2000 IU every other day  -Continue current calcium citrate supplement, 2 tablets twice daily  -Continue alendronate (Fosamax) 70 mg by mouth once a week. Take the medication on an empty stomach, first thing in the morning with at least 8 ounces of water--do not take with other drinks. Remain upright (do not lie down) and do not eat take anything else by mouth (pills, food or drink) until at least 30 minutes after taking the medication.  -We will anticipate another set of labs in October 2024 to recheck vitamin D level and decide if supplement dose needs to be adjusted  -Follow-up in 1 year, with blood draw and DXA scan prior to visit (DXA scan should be completed on the same scanner as prior study, at [facility]  -We will communicate results via [employer], or if needed by phone

## 2024-05-13 NOTE — LETTER
5/13/2024       RE: Nannette Awad  [address]  [place] [address]     Dear Colleague,    Thank you for referring your patient, Nannette Awad, to the [facility] ENDOCRINOLOGY CLINIC [place] [facility]. Please see a copy of my visit note below.      ENDOCRINOLOGY VIDEO FOLLOW-UP        HISTORY OF PRESENT ILLNESS    Nannette Awad (prefers to be called [facility]) is seen in follow-up via billable video visit.    No falls or fractures since last visit.    We started alendronate last visit in 5/2023.  She confirms that she has been taking the medication once weekly only with water and waiting 30 minutes before having beverages, food or other medications.  She is tolerating it without side effects.    Continues on calcium supplement 2 tablets twice daily of Fleming brand calcium citrate (2 tablets provide 500 mg calcium citrate, 800 IU vitamin D3).      She is taking vitamin D3 2000 IU daily (1 capsule provides 2000 IU vitamin D3).    She has GERD which is managed with PPI.    No recent dehydration, nausea or vomiting.    Uses NSAIDs occasionally, not frequently.    Was initiated on Topamax and took the medication for 3 weeks before starting to taper due to side effects: She is on her last week of taper.  Otherwise no new medications.    Pertinent endocrine and related history:  1.  Low bone density.  -Earliest DXA scan performed in our system was in 2006.  This study showed low bone density, with lowest T score -2.0 at the left femoral neck.  The patient was treated with Fosamax at that time and plan was to continue treatment.  Fosamax treatment continued at 70 mg weekly; in 8/2011, dosage was reduced to 35 mg weekly due to decline in kidney function.  -Most recent DXA scan performed on 9/20/2022 showed low bone density:  L1-L4 T score 0.0, with 2.6% decline in BMD compared to 2019 (significant).  Left femoral neck T score -2.0, left total hip T score  -1.5.  Right femoral neck T score -2.0, right total hip T score -1.3.  1.7% decline in BMD at the total mean hip compared to 2019 (not significant).  -In 2/2012, the patient saw Dr. Chavarria in endocrinology.  At that time, endocrinology progress note mentions patient had been on Fosamax for 10 years.  The medication was discontinued at that time.  It does not appear that patient followed up in endocrinology thereafter.  -Prior fractures:  C1 and C2 compression fracture in 4/2022, diagnosed after acute onset of neck pain. Was sitting on toilet and felt like she was going to pass out--fell (perhaps off toilet) the patient was hospitalized in the [facility] system.  Right foot stress fracture in 6/2010. Had recurrent stress fracture in same foot and in the same bone within 6 months.  -We screened for occult vertebral fractures in the thoracic and lumbar spine, no fractures were noted on x-rays.  Our work-up for secondary causes of low bone density revealed mild hypercalciuria  otherwise normal screening for multiple myeloma, normal PTH, normal CBC and thyroid function test.   2.  History of intermittent hypercalcemia.  Mild, noted in 2021 in 2022.  -No history of nephrolithiasis.  3.  High risk for diabetes.    Other pertinent medical history includes GERD, hypertension, hyperlipidemia, mitral valve prolapse and obesity.    Pertinent Social History: Lives in [place], [place].  , 2 children, 3 grandchildren, after children grew up worked as an . Now retired.    PAST MEDICAL HISTORY  Past Medical History:   Diagnosis Date    Arthritis 2012    Bursitis of knee 2012    Left leg    Carpal tunnel syndrome 1970    Left wrist, no surgery    Elevated fasting glucose     Family history of breast cancer     GERD (gastroesophageal reflux disease)     EGD     HSIL (high grade squamous intraepithelial lesion) on Pap smear of cervix 2006    s/p hyst     HTN (hypertension)     Hyperlipidemia LDL goal < 130 2002    Insomnia  1995    Left sided sciatica 2012    Migraine     Mitral (valve) prolapse     Obesity 04/16/2014    Osteopenia 2000    Psoriasis     Raynaud's disease     Tubular adenoma     Vitamin D deficiency 2008       MEDICATIONS  Current Outpatient Medications   Medication Sig Dispense Refill    alendronate (FOSAMAX) 70 MG tablet Take 1 tablet (70 mg) by mouth every 7 days Take 60 minutes before am meal with 8 oz. water. Remain upright for 30 minutes. 12 tablet 1    amLODIPine (NORVASC) 10 MG tablet Take 1 tablet (10 mg) by mouth every morning      bisacodyl (DULCOLAX) 5 MG EC tablet Two days prior to exam take two (2) tablets at 4pm. One day prior to exam take two (2) tablets at 4pm 4 tablet 0    Calcium Carbonate-Vitamin D (CALCIUM 500 + D PO) Take 500 mg by mouth 2 times daily 2 tablets bid      lisinopril (ZESTRIL) 10 MG tablet Take 1 tablet (10 mg) by mouth at bedtime      MULTI-DAY OR daily      omeprazole (PRILOSEC) 20 MG DR capsule TAKE 1 CAPSULE EVERY DAY 90 capsule 3    polyethylene glycol (GOLYTELY) 236 g suspension Take as directed. Two days before your exam fill the first container with water. Cover and shake until mixed well. At 5:00pm drink one 8oz glass every 10-15 minutes until half (1/2) of the first container is empty. Store the remainder in the refrigerator. One day before your exam at 5:00pm drink the second half of the first container until it is gone. Before you go to bed mix the second container with water and put in refrigerator. Six hours before your check in time drink one 8oz glass every 10-15 minutes until half of container is empty. Discard the remainder of solution. 8000 mL 0    pravastatin (PRAVACHOL) 40 MG tablet TAKE 1 TABLET EVERY DAY 90 tablet 3    SUMAtriptan (IMITREX) 50 MG tablet TAKE 1 TABLET  AT ONSET OF HEADACHE FOR MIGRAINE MAY REPEAT IN 2 HOURS. MAX 4 TABLETS/24 HOURS. 27 tablet 1    topiramate (TOPAMAX) 25 MG tablet Take 1 tablet (25 mg) by mouth daily for 7 days, THEN 2 tablets (50  mg) daily for 7 days, THEN 3 tablets (75 mg) daily for 7 days, THEN 4 tablets (100 mg) daily for 30 days. 162 tablet 0    triamcinolone (KENALOG) 0.1 % external cream Apply topically 2 times daily To rash on chest and affected area on vulva 45 g 2    VITAMIN D 1000 UNIT OR TABS 1 TABLET DAILY      zolpidem (AMBIEN) 5 MG tablet Take 1 tablet (5 mg) by mouth nightly as needed for sleep 90 tablet 1       Allergies, family, and social history were reviewed and documented as needed in EHR.     REVIEW OF SYSTEMS  A focused ROS was performed, with pertinent positives and negatives as noted in the HPI.    PHYSICAL EXAM  There were no vitals taken for this visit.  There is no height or weight on file to calculate BMI.  Constitutional: Patient is alert, oriented and appears in no acute distress.  Eyes: Eyes grossly normal to inspection, EOMI, no stare, lid lag, or retraction; no conjunctival injection.  ENMT: Lips are without lesions.   Neck: No visible goiter or neck mass.  Respiratory: No audible wheeze or cough. No visible cyanosis. No visible increased work of breathing.  Neurological: Alert and oriented times 3.  Cranial nerves grossly intact.      DATA REVIEW  Each of the following laboratory and/or imaging studies were reviewed.          ASSESSMENT  1.  Low bone density.  With high FRAX calculated 10-year risk of fracture: Initiated alendronate in 5/2023 and she has been tolerating this without side effect.  Continue current medication regimen without changes.  Previsit labs show vitamin D level is trending up: We will reduce supplement dose slightly.  Continue calcium regimen without changes.  Follow-up in 1 year, with DXA scan prior to visit.    2.  Hypercalciuria.  Elevation in 24 urine calcium excretion on initial studies in 3/2023 (to 330 mg per 24 hours).  On follow-up testing, 24 urine calcium excretion had improved to 240 mg per 24 hours in 2023, 270 mg per 24 hours on labs completed prior to this visit.  24  urine calcium excretion is acceptable: No additional intervention.    3.  Hypercalcemia.  Mild and intermittent, captured on prior studies.  Recheck calcium along with PTH have been normal.  SPEP and UPEP have also been normal.  Follow carefully.    4.  History of abnormal thyroid function test.  Noted in 2012 and 2014, normal on follow-up testing last in 2017.  Recheck in 3/2023 showed normal TSH.    5.  High risk for diabetes.  Based on hemoglobin A1c.  Continue periodic monitoring in primary care.    6.  Decline in EGFR.  No history to indicate volume depletion or use of medications that could be contributing to change in kidney function.  We discussed maintaining sufficient hydration and reevaluating kidney function in 1 week.  If abnormalities persist, may refer back to PCP for additional evaluation.    PLAN  -Schedule lab draw in 1 week to recheck kidney function tests  -Reduce vitamin D to 2000 IU every other day  -Continue current calcium citrate supplement, 2 tablets twice daily  -Continue alendronate (Fosamax) 70 mg by mouth once a week. Take the medication on an empty stomach, first thing in the morning with at least 8 ounces of water--do not take with other drinks. Remain upright (do not lie down) and do not eat take anything else by mouth (pills, food or drink) until at least 30 minutes after taking the medication.  -We will anticipate another set of labs in October 2024 to recheck vitamin D level and decide if supplement dose needs to be adjusted  -Follow-up in 1 year, with blood draw and DXA scan prior to visit (DXA scan should be completed on the same scanner as prior study, at [facility]  -We will communicate results via [employer], or if needed by phone      Orders Placed This Encounter   Procedures    DX Bone Density    Basic metabolic panel    Basic metabolic panel    Albumin level    Vitamin D Deficiency         Video-Visit Details    Type of service:  Video visit    Physician  location: Offsite    Video Start Time: 11:39 AM  Video End Time: 11:49 AM    Platform used for Video Visit: Trudy Montaño MD   Division of Diabetes, Endocrinology and Metabolism  Department of Medicine      cc: Joy Dickson MD

## 2024-05-15 NOTE — TELEPHONE ENCOUNTER
Extended Golytely Bowel Prep . Instructions were sent via [employer]. Bowel prep was sent 4/4/24 to    [facility] [place], [place] - [address] [facility] previously.

## 2024-05-22 ENCOUNTER — ANCILLARY PROCEDURE (OUTPATIENT)
Dept: MRI IMAGING | Facility: CLINIC | Age: 69
End: 2024-05-22
Attending: NURSE PRACTITIONER
Payer: COMMERCIAL

## 2024-05-22 ENCOUNTER — ANCILLARY PROCEDURE (OUTPATIENT)
Dept: GENERAL RADIOLOGY | Facility: CLINIC | Age: 69
End: 2024-05-22
Attending: NURSE PRACTITIONER
Payer: COMMERCIAL

## 2024-05-22 ENCOUNTER — ANCILLARY PROCEDURE (OUTPATIENT)
Dept: CT IMAGING | Facility: CLINIC | Age: 69
End: 2024-05-22
Attending: NURSE PRACTITIONER
Payer: COMMERCIAL

## 2024-05-22 DIAGNOSIS — M54.12 CERVICAL RADICULOPATHY: ICD-10-CM

## 2024-05-22 DIAGNOSIS — M54.16 LUMBAR RADICULOPATHY: ICD-10-CM

## 2024-05-22 PROCEDURE — 70553 MRI BRAIN STEM W/O & W/DYE: CPT | Mod: TC | Performed by: RADIOLOGY

## 2024-05-22 PROCEDURE — A9585 GADOBUTROL INJECTION: HCPCS | Performed by: RADIOLOGY

## 2024-05-22 PROCEDURE — 72148 MRI LUMBAR SPINE W/O DYE: CPT | Mod: TC | Performed by: RADIOLOGY

## 2024-05-22 PROCEDURE — 72100 X-RAY EXAM L-S SPINE 2/3 VWS: CPT | Mod: TC | Performed by: RADIOLOGY

## 2024-05-22 PROCEDURE — 72125 CT NECK SPINE W/O DYE: CPT | Mod: TC | Performed by: RADIOLOGY

## 2024-05-22 PROCEDURE — 72141 MRI NECK SPINE W/O DYE: CPT | Mod: TC | Performed by: RADIOLOGY

## 2024-05-22 RX ORDER — GADOBUTROL 604.72 MG/ML
0.1 INJECTION INTRAVENOUS ONCE
Status: COMPLETED | OUTPATIENT
Start: 2024-05-22 | End: 2024-05-22

## 2024-05-22 RX ADMIN — GADOBUTROL 8 ML: 604.72 INJECTION INTRAVENOUS at 14:26

## 2024-05-24 ENCOUNTER — TELEPHONE (OUTPATIENT)
Dept: NEUROSURGERY | Facility: CLINIC | Age: 69
End: 2024-05-24
Payer: COMMERCIAL

## 2024-05-24 ENCOUNTER — TELEPHONE (OUTPATIENT)
Dept: ENDOCRINOLOGY | Facility: CLINIC | Age: 69
End: 2024-05-24
Payer: COMMERCIAL

## 2024-05-24 NOTE — TELEPHONE ENCOUNTER
Kate requested a phone visit on 5/29/24 at 330pm to go over imaging results. Writer LVDEEP for patient to advise she is scheduled for a phone visit that day and time and to call [phone] if this does not work for her.

## 2024-05-24 NOTE — TELEPHONE ENCOUNTER
Left Voicemail (1st Attempt) and Sent Mychart (1st Attempt) for the patient to call back and schedule the following:    Appointment type: Return Endocrine  Provider: Dr. Montaño  Return date: 1 year (around May 2025)  Specialty phone number: [phone]  Additional appointment(s) needed: labs on or near 10/30/24 and DXA prior to follow-up   Additonal Notes: LVM/MyC x1  Check Out Comments: Please schedule follow-up in 1 year, needs labs and DXA scan prior to visit in 1 year    BACK PAIN

## 2024-05-28 ENCOUNTER — TELEPHONE (OUTPATIENT)
Dept: NEUROSURGERY | Facility: CLINIC | Age: 69
End: 2024-05-28
Payer: COMMERCIAL

## 2024-05-28 NOTE — TELEPHONE ENCOUNTER
Attempted to reach patient to remind them about appointment scheduled with Kate Burroughs NP on 5/29/24 in our [place] location.    A voicemail was left with a call back number if the patient has questions or would like to reschedule.

## 2024-05-29 ENCOUNTER — VIRTUAL VISIT (OUTPATIENT)
Dept: NEUROSURGERY | Facility: CLINIC | Age: 69
End: 2024-05-29
Payer: COMMERCIAL

## 2024-05-29 DIAGNOSIS — R20.2 PARESTHESIAS: ICD-10-CM

## 2024-05-29 DIAGNOSIS — M54.12 CERVICAL RADICULOPATHY: ICD-10-CM

## 2024-05-29 DIAGNOSIS — M54.14 THORACIC RADICULOPATHY: Primary | ICD-10-CM

## 2024-05-29 DIAGNOSIS — M54.16 LUMBAR RADICULOPATHY: ICD-10-CM

## 2024-05-29 PROCEDURE — 99443 PR PHYSICIAN TELEPHONE EVALUATION 21-30 MIN: CPT | Performed by: NURSE PRACTITIONER

## 2024-05-29 NOTE — LETTER
"    5/29/2024         RE: Nannette Awad  [address] [facility] [address]        Dear Colleague,    Thank you for referring your patient, Nannette Awad, to the [facility] NEUROLOGY CLINICS [facility]. Please see a copy of my visit note below.    The patient has been notified of following:     \"This telephone visit will be conducted via a call between you and your physician/provider. We have found that certain health care needs can be provided without the need for a physical exam.  This service lets us provide the care you need with a short phone conversation.  If a prescription is necessary we can send it directly to your pharmacy.  If lab work is needed we can place an order for that and you can then stop by our lab to have the test done at a later time.    If during the course of the call the physician/provider feels a telephone visit is not appropriate, you will not be charged for this service.\"     Physician/provider has received verbal consent for a Telephone Visit from the patient? Yes     [facility] Neurosurgery Clinic  Virtual Visit     HPI: Nannette Awad is a 69 year old female who presents for evaluation of left-sided pain. Patient reports burning left-sided pain in the face, neck, entire left arm, and hand.  Symptoms developed about 6 to 9 months ago.  She also reports associated headaches that are different compared to her chronic migraines.  Patient denies any trauma or injuries at onset of symptoms.  Denies numbness, weakness, or dexterity issues.  She has history of previous fracture involving the right lateral mass of C1 and right transverse process of C2 that occurred after a fall in 2022. Patient also reports chronic burning left leg pain that has been present for 20 years and gradually worsened over the last 6 months.  Patient reports pain starts in the left buttocks and radiates to left posterolateral leg and ankle.  Patient reports difficulty sleeping because pain " increases when sitting and laying. Denies any low back pain, numbness, weakness, saddle anesthesia, falls, or bowel/bladder changes. She has tried NSAIDs, ice packs, SCOTTY many years ago, and chiropractic care more recently, but these interventions only provide temporary relief.  She has history of osteopenia, most recent DEXA is from 2022.  She is currently taking vitamin D, calcium, and Fosamax.    5/29/2024: Patient presents for telephone visit to discuss imaging results.  Patient reports symptoms have gradually worsened since last office visit.  She now has burning pain and paresthesias on the right side of her body including face, neck, chest, thoracic spine, across shoulder blades, rib cage, abdomen, and right leg to knee.  She reports continued left-sided symptoms and headaches as discussed at last office visit.  Denies any nausea, vomiting, dizziness, vision or speech changes, numbness, weakness, dexterity issues, falls, gait changes, or bowel/bladder changes.    ROS: 10 point ROS neg other than the symptoms noted above in the HPI.    Imaging:   MRI BRAIN WITHOUT AND WITH CONTRAST  5/22/2024 2:42 PM                                               IMPRESSION:  Chronic changes as above. No acute intracranial findings.  No findings to explain patient's clinical symptoms. No abnormal  enhancement.    CT CERVICAL SPINE WITHOUT CONTRAST 5/22/2024 1:26 PM   IMPRESSION: Evidence of healed fracture of the right C1 lateral mass  without acute fracture findings identified. No acute fracture findings  the level as well. Degenerative changes as above. Moderate right  foraminal narrowing noted at C4-5.    MRI CERVICAL SPINE WITHOUT CONTRAST 5/22/2024 2:42 PM   IMPRESSION:  Multilevel degenerative changes as detailed above. No  cord signal abnormality.    XR LUMBAR SPINE 2/3 VIEWS  5/22/2024 1:01 PM   IMPRESSION: Grade 1 anterolisthesis of L4 on L5. Posterior element  degenerative changes and multilevel loss of disc height. No  abnormal  motion on flexion or extension views. No loss of vertebral body  height.    MRI LUMBAR SPINE WITHOUT CONTRAST   5/22/2024 2:13 PM   IMPRESSION:  Extensive degenerative changes in the lumbar spine as  detailed above. Severe canal stenotic findings at L4-5 and L3-4.  Please see above discussion for level specific findings. Compared to  the 2023 examination, the degenerative changes are similar.        Assessment:   69-year-old female with headaches and diffuse pain and paresthesias.  We discussed imaging results as stated above.  Patient would like to focus on conservative management at this time.     Plan:  -Thoracic spine MRI ordered to further  evaluate new symptoms   -Referral to Neurology  -Referral to [facility] for injections and comprehensive evaluation  -Patient was previously referred to physical therapy.  She plans to call and schedule appointment with their clinic.  -Advised patient to call our clinic with any questions or concerns    Patient voiced understanding and agreement.      Phone call duration: 22 minutes  Provider location: Clinic  Patient location: Off site     Kate Burroughs CNP  [facility] Neurosurgery  Tel [phone]  Pager [phone]      Again, thank you for allowing me to participate in the care of your patient.        Sincerely,        Kate Burroughs NP

## 2024-05-29 NOTE — PROGRESS NOTES
"The patient has been notified of following:     \"This telephone visit will be conducted via a call between you and your physician/provider. We have found that certain health care needs can be provided without the need for a physical exam.  This service lets us provide the care you need with a short phone conversation.  If a prescription is necessary we can send it directly to your pharmacy.  If lab work is needed we can place an order for that and you can then stop by our lab to have the test done at a later time.    If during the course of the call the physician/provider feels a telephone visit is not appropriate, you will not be charged for this service.\"     Physician/provider has received verbal consent for a Telephone Visit from the patient? Yes     [facility] Neurosurgery Clinic  Virtual Visit     HPI: Nannette Awad is a 69 year old female who presents for evaluation of left-sided pain. Patient reports burning left-sided pain in the face, neck, entire left arm, and hand.  Symptoms developed about 6 to 9 months ago.  She also reports associated headaches that are different compared to her chronic migraines.  Patient denies any trauma or injuries at onset of symptoms.  Denies numbness, weakness, or dexterity issues.  She has history of previous fracture involving the right lateral mass of C1 and right transverse process of C2 that occurred after a fall in 2022. Patient also reports chronic burning left leg pain that has been present for 20 years and gradually worsened over the last 6 months.  Patient reports pain starts in the left buttocks and radiates to left posterolateral leg and ankle.  Patient reports difficulty sleeping because pain increases when sitting and laying. Denies any low back pain, numbness, weakness, saddle anesthesia, falls, or bowel/bladder changes. She has tried NSAIDs, ice packs, SCOTTY many years ago, and chiropractic care more recently, but these interventions only provide " temporary relief.  She has history of osteopenia, most recent DEXA is from 2022.  She is currently taking vitamin D, calcium, and Fosamax.    5/29/2024: Patient presents for telephone visit to discuss imaging results.  Patient reports symptoms have gradually worsened since last office visit.  She now has burning pain and paresthesias on the right side of her body including face, neck, chest, thoracic spine, across shoulder blades, rib cage, abdomen, and right leg to knee.  She reports continued left-sided symptoms and headaches as discussed at last office visit.  Denies any nausea, vomiting, dizziness, vision or speech changes, numbness, weakness, dexterity issues, falls, gait changes, or bowel/bladder changes.    ROS: 10 point ROS neg other than the symptoms noted above in the HPI.    Imaging:   MRI BRAIN WITHOUT AND WITH CONTRAST  5/22/2024 2:42 PM                                               IMPRESSION:  Chronic changes as above. No acute intracranial findings.  No findings to explain patient's clinical symptoms. No abnormal  enhancement.    CT CERVICAL SPINE WITHOUT CONTRAST 5/22/2024 1:26 PM   IMPRESSION: Evidence of healed fracture of the right C1 lateral mass  without acute fracture findings identified. No acute fracture findings  the level as well. Degenerative changes as above. Moderate right  foraminal narrowing noted at C4-5.    MRI CERVICAL SPINE WITHOUT CONTRAST 5/22/2024 2:42 PM   IMPRESSION:  Multilevel degenerative changes as detailed above. No  cord signal abnormality.    XR LUMBAR SPINE 2/3 VIEWS  5/22/2024 1:01 PM   IMPRESSION: Grade 1 anterolisthesis of L4 on L5. Posterior element  degenerative changes and multilevel loss of disc height. No abnormal  motion on flexion or extension views. No loss of vertebral body  height.    MRI LUMBAR SPINE WITHOUT CONTRAST   5/22/2024 2:13 PM   IMPRESSION:  Extensive degenerative changes in the lumbar spine as  detailed above. Severe canal stenotic findings at  L4-5 and L3-4.  Please see above discussion for level specific findings. Compared to  the 2023 examination, the degenerative changes are similar.        Assessment:   69-year-old female with headaches and diffuse pain and paresthesias.  We discussed imaging results as stated above.  Patient would like to focus on conservative management at this time.     Plan:  -Thoracic spine MRI ordered to further  evaluate new symptoms   -Referral to Neurology  -Referral to [facility] for comprehensive evaluation  -Patient was previously referred to physical therapy.  She plans to call and schedule appointment with their clinic.  -Advised patient to call our clinic with any questions or concerns    Patient voiced understanding and agreement.      Discussed with Dr. [place].     Phone call duration: 22 minutes  Provider location: Clinic  Patient location: Off site     Kate Burroughs CNP  [facility] Neurosurgery  Tel [phone]  Pager [phone]

## 2024-05-31 NOTE — TELEPHONE ENCOUNTER
Left Voicemail (2nd Attempt) and Sent [employer] (2nd Attempt) for the patient to call back and schedule the following:    Appointment type: DXA  Provider: per Dr. Montaño  Return date: prior to follow-up visit in 1 year  Additional Notes: **Lab and follow-up visit have been scheduled **    Check Out Comments: Please schedule follow-up in 1 year, needs labs and DXA scan prior to visit in 1 year

## 2024-06-01 ENCOUNTER — LAB (OUTPATIENT)
Dept: LAB | Facility: CLINIC | Age: 69
End: 2024-06-01
Payer: COMMERCIAL

## 2024-06-01 DIAGNOSIS — M85.9 LOW BONE DENSITY: ICD-10-CM

## 2024-06-01 PROCEDURE — 36415 COLL VENOUS BLD VENIPUNCTURE: CPT

## 2024-06-01 PROCEDURE — 80048 BASIC METABOLIC PNL TOTAL CA: CPT

## 2024-06-02 LAB
ANION GAP SERPL CALCULATED.3IONS-SCNC: 12 MMOL/L (ref 7–15)
BUN SERPL-MCNC: 18.4 MG/DL (ref 8–23)
CALCIUM SERPL-MCNC: 9.9 MG/DL (ref 8.8–10.2)
CHLORIDE SERPL-SCNC: 102 MMOL/L (ref 98–107)
CREAT SERPL-MCNC: 0.98 MG/DL (ref 0.51–0.95)
DEPRECATED HCO3 PLAS-SCNC: 23 MMOL/L (ref 22–29)
EGFRCR SERPLBLD CKD-EPI 2021: 61 ML/MIN/1.73M2
GLUCOSE SERPL-MCNC: 188 MG/DL (ref 70–99)
POTASSIUM SERPL-SCNC: 4.4 MMOL/L (ref 3.4–5.3)
SODIUM SERPL-SCNC: 137 MMOL/L (ref 135–145)

## 2024-06-10 ENCOUNTER — TELEPHONE (OUTPATIENT)
Dept: NEUROSURGERY | Facility: CLINIC | Age: 69
End: 2024-06-10
Payer: COMMERCIAL

## 2024-06-10 NOTE — H&P
[facility] Anesthesia Pre-op History and Physical    Nannette Awad MRN# [id number]   Age: 69 year old YOB: 1955      Date of Surgery: 6/12/2024 Location [facility]      Date of Exam 6/12/2024 Facility (In hospital)       Home clinic: [facility]  Primary care provider: Joy Dickson         Chief Complaint and/or Reason for Procedure:   No chief complaint on file.  Colonoscopy  Exam 2014 adenomas, positive cologuard       Active problem list:     Patient Active Problem List    Diagnosis Date Noted    Hypertension goal BP (blood pressure) < 150/90 12/16/2010     Priority: High    Hyperlipidemia LDL goal <130 10/31/2010     Priority: High    Prediabetes 02/01/2024     Priority: Medium    Vitamin D deficiency 08/14/2018     Priority: Medium    Migraine without status migrainosus, not intractable, unspecified migraine type 02/15/2016     Priority: Medium    Alopecia 02/15/2016     Priority: Medium    Family history of breast cancer 04/03/2014     Priority: Medium     Consider genetic counsling      GERD (gastroesophageal reflux disease) 08/29/2012     Priority: Medium    Osteopenia 09/30/2011     Priority: Medium    Advanced directives, counseling/discussion 06/30/2011     Priority: Low     Advance Directive Problem List Overview:   Name Relationship Phone    Primary Health Care Agent            Alternative Health Care Agent          Discussed advance care planning with patient; information given to patient to review. 6/30/2011                 Medications (include herbals and vitamins):   Any Plavix use in the last 7 days? No     No current facility-administered medications for this encounter.     Current Outpatient Medications   Medication Sig Dispense Refill    alendronate (FOSAMAX) 70 MG tablet Take 1 tablet (70 mg) by mouth every 7 days Take 60 minutes before am meal with 8 oz. water. Remain upright for 30 minutes. 12 tablet 1     amLODIPine (NORVASC) 10 MG tablet Take 1 tablet (10 mg) by mouth every morning      bisacodyl (DULCOLAX) 5 MG EC tablet Two days prior to exam take two (2) tablets at 4pm. One day prior to exam take two (2) tablets at 4pm 4 tablet 0    Calcium Carbonate-Vitamin D (CALCIUM 500 + D PO) Take 500 mg by mouth 2 times daily 2 tablets bid      lisinopril (ZESTRIL) 10 MG tablet Take 1 tablet (10 mg) by mouth at bedtime      MULTI-DAY OR daily      omeprazole (PRILOSEC) 20 MG DR capsule TAKE 1 CAPSULE EVERY DAY 90 capsule 3    polyethylene glycol (GOLYTELY) 236 g suspension Take as directed. Two days before your exam fill the first container with water. Cover and shake until mixed well. At 5:00pm drink one 8oz glass every 10-15 minutes until half (1/2) of the first container is empty. Store the remainder in the refrigerator. One day before your exam at 5:00pm drink the second half of the first container until it is gone. Before you go to bed mix the second container with water and put in refrigerator. Six hours before your check in time drink one 8oz glass every 10-15 minutes until half of container is empty. Discard the remainder of solution. 8000 mL 0    pravastatin (PRAVACHOL) 40 MG tablet TAKE 1 TABLET EVERY DAY 90 tablet 3    SUMAtriptan (IMITREX) 50 MG tablet TAKE 1 TABLET  AT ONSET OF HEADACHE FOR MIGRAINE MAY REPEAT IN 2 HOURS. MAX 4 TABLETS/24 HOURS. 27 tablet 1    topiramate (TOPAMAX) 25 MG tablet Take 1 tablet (25 mg) by mouth daily for 7 days, THEN 2 tablets (50 mg) daily for 7 days, THEN 3 tablets (75 mg) daily for 7 days, THEN 4 tablets (100 mg) daily for 30 days. 162 tablet 0    triamcinolone (KENALOG) 0.1 % external cream Apply topically 2 times daily To rash on chest and affected area on vulva 45 g 2    VITAMIN D 1000 UNIT OR TABS 1 TABLET DAILY      zolpidem (AMBIEN) 5 MG tablet Take 1 tablet (5 mg) by mouth nightly as needed for sleep 90 tablet 1             Allergies:      Allergies   Allergen Reactions     Lipitor [Atorvastatin Calcium]      cramps    Sulfa Antibiotics Rash     Allergy to Latex? No  Allergy to tape?   No  Intolerances:             Physical Exam:   All vitals have been reviewed  No data found.  No intake/output data recorded.  Lungs:   No increased work of breathing, good air exchange, clear to auscultation bilaterally, no crackles or wheezing     Cardiovascular:   Normal apical impulse, regular rate and rhythm, normal S1 and S2, no S3 or S4, and no murmur noted             Lab / Radiology Results:            Anesthetic risk and/or ASA classification:       Tolu Baez MD

## 2024-06-10 NOTE — TELEPHONE ENCOUNTER
Called patient   She is holding off making her neurology appt until her  's appt's have been set  Provided phone number for scheduling

## 2024-06-11 ENCOUNTER — ANESTHESIA EVENT (OUTPATIENT)
Dept: GASTROENTEROLOGY | Facility: CLINIC | Age: 69
End: 2024-06-11
Payer: COMMERCIAL

## 2024-06-12 ENCOUNTER — HOSPITAL ENCOUNTER (OUTPATIENT)
Facility: CLINIC | Age: 69
Discharge: HOME | End: 2024-06-12
Attending: INTERNAL MEDICINE | Admitting: INTERNAL MEDICINE
Payer: COMMERCIAL

## 2024-06-12 ENCOUNTER — ANESTHESIA (OUTPATIENT)
Dept: GASTROENTEROLOGY | Facility: CLINIC | Age: 69
End: 2024-06-12
Payer: COMMERCIAL

## 2024-06-12 VITALS
DIASTOLIC BLOOD PRESSURE: 56 MMHG | SYSTOLIC BLOOD PRESSURE: 103 MMHG | HEART RATE: 74 BPM | RESPIRATION RATE: 16 BRPM | TEMPERATURE: 98.3 F | OXYGEN SATURATION: 99 %

## 2024-06-12 LAB — COLONOSCOPY: NORMAL

## 2024-06-12 PROCEDURE — 45385 COLONOSCOPY W/LESION REMOVAL: CPT | Mod: PT | Performed by: INTERNAL MEDICINE

## 2024-06-12 PROCEDURE — 258N000003 HC RX IP 258 OP 636: Mod: JZ | Performed by: NURSE ANESTHETIST, CERTIFIED REGISTERED

## 2024-06-12 PROCEDURE — 88305 TISSUE EXAM BY PATHOLOGIST: CPT | Mod: 26 | Performed by: PATHOLOGY

## 2024-06-12 PROCEDURE — 370N000017 HC ANESTHESIA TECHNICAL FEE, PER MIN: Performed by: INTERNAL MEDICINE

## 2024-06-12 PROCEDURE — 250N000011 HC RX IP 250 OP 636: Performed by: NURSE ANESTHETIST, CERTIFIED REGISTERED

## 2024-06-12 PROCEDURE — 88305 TISSUE EXAM BY PATHOLOGIST: CPT | Mod: TC,XU | Performed by: INTERNAL MEDICINE

## 2024-06-12 PROCEDURE — 250N000009 HC RX 250: Performed by: NURSE ANESTHETIST, CERTIFIED REGISTERED

## 2024-06-12 RX ORDER — ONDANSETRON 4 MG/1
4 TABLET, ORALLY DISINTEGRATING ORAL EVERY 30 MIN PRN
Status: DISCONTINUED | OUTPATIENT
Start: 2024-06-12 | End: 2024-06-12 | Stop reason: HOSPADM

## 2024-06-12 RX ORDER — ACETAMINOPHEN 325 MG/1
975 TABLET ORAL EVERY 6 HOURS PRN
Status: DISCONTINUED | OUTPATIENT
Start: 2024-06-12 | End: 2024-06-12 | Stop reason: HOSPADM

## 2024-06-12 RX ORDER — LIDOCAINE HYDROCHLORIDE 20 MG/ML
INJECTION, SOLUTION INFILTRATION; PERINEURAL PRN
Status: DISCONTINUED | OUTPATIENT
Start: 2024-06-12 | End: 2024-06-12

## 2024-06-12 RX ORDER — SODIUM CHLORIDE, SODIUM LACTATE, POTASSIUM CHLORIDE, CALCIUM CHLORIDE 600; 310; 30; 20 MG/100ML; MG/100ML; MG/100ML; MG/100ML
INJECTION, SOLUTION INTRAVENOUS CONTINUOUS
Status: DISCONTINUED | OUTPATIENT
Start: 2024-06-12 | End: 2024-06-12 | Stop reason: HOSPADM

## 2024-06-12 RX ORDER — PROPOFOL 10 MG/ML
INJECTION, EMULSION INTRAVENOUS PRN
Status: DISCONTINUED | OUTPATIENT
Start: 2024-06-12 | End: 2024-06-12

## 2024-06-12 RX ORDER — PROPOFOL 10 MG/ML
INJECTION, EMULSION INTRAVENOUS CONTINUOUS PRN
Status: DISCONTINUED | OUTPATIENT
Start: 2024-06-12 | End: 2024-06-12

## 2024-06-12 RX ORDER — ONDANSETRON 2 MG/ML
4 INJECTION INTRAMUSCULAR; INTRAVENOUS EVERY 30 MIN PRN
Status: DISCONTINUED | OUTPATIENT
Start: 2024-06-12 | End: 2024-06-12 | Stop reason: HOSPADM

## 2024-06-12 RX ADMIN — SODIUM CHLORIDE, POTASSIUM CHLORIDE, SODIUM LACTATE AND CALCIUM CHLORIDE: 600; 310; 30; 20 INJECTION, SOLUTION INTRAVENOUS at 11:00

## 2024-06-12 RX ADMIN — PROPOFOL 100 MG: 10 INJECTION, EMULSION INTRAVENOUS at 11:18

## 2024-06-12 RX ADMIN — PROPOFOL 200 MCG/KG/MIN: 10 INJECTION, EMULSION INTRAVENOUS at 11:18

## 2024-06-12 RX ADMIN — LIDOCAINE HYDROCHLORIDE 50 MG: 20 INJECTION, SOLUTION INFILTRATION; PERINEURAL at 11:18

## 2024-06-12 ASSESSMENT — ACTIVITIES OF DAILY LIVING (ADL)
ADLS_ACUITY_SCORE: 35
ADLS_ACUITY_SCORE: 35

## 2024-06-12 NOTE — ANESTHESIA CARE TRANSFER NOTE
1  Patient: Nannette Awad    Procedure: Procedure(s):  COLONOSCOPY, FLEXIBLE, WITH LESION REMOVAL USING SNARE       Diagnosis: Screen for colon cancer [Z12.11]  Diagnosis Additional Information: No value filed.    Anesthesia Type:   MAC     Note:    Oropharynx: oropharynx clear of all foreign objects and spontaneously breathing  Level of Consciousness: awake  Oxygen Supplementation: room air    Independent Airway: airway patency satisfactory and stable  Dentition: dentition unchanged  Vital Signs Stable: post-procedure vital signs reviewed and stable  Report to RN Given: handoff report given  Patient transferred to: Phase II    Handoff Report: Identifed the Patient, Identified the Reponsible Provider, Reviewed the pertinent medical history, Discussed the surgical course, Reviewed Intra-OP anesthesia mangement and issues during anesthesia, Set expectations for post-procedure period and Allowed opportunity for questions and acknowledgement of understanding      Vitals:  Vitals Value Taken Time   /56 06/12/24 1200   Temp     Pulse 74 06/12/24 1200   Resp     SpO2 97 % 06/12/24 1211   Vitals shown include unfiled device data.    Electronically Signed By: KD Irizarry CRNA  June 12, 2024  12:27 PM

## 2024-06-12 NOTE — ANESTHESIA PREPROCEDURE EVALUATION
Anesthesia Pre-Procedure Evaluation    Patient: Nannette Awad   MRN: [id number] : 1955        Procedure : Procedure(s):  Colonoscopy          Past Medical History:   Diagnosis Date    Arthritis     Bursitis of knee     Left leg    Carpal tunnel syndrome 1970    Left wrist, no surgery    Elevated fasting glucose     Family history of breast cancer     GERD (gastroesophageal reflux disease)     EGD     HSIL (high grade squamous intraepithelial lesion) on Pap smear of cervix     s/p hyst     HTN (hypertension)     Hyperlipidemia LDL goal < 130     Insomnia     Left sided sciatica     Migraine     Mitral (valve) prolapse     Obesity 2014    Osteopenia 2000    Psoriasis     Raynaud's disease     Tubular adenoma     Vitamin D deficiency       Past Surgical History:   Procedure Laterality Date    BREAST BIOPSY, CORE RT/LT      COLONOSCOPY      EP STUDY TILT TABLE N/A 2024    Procedure: Tilt Table Study;  Surgeon: Giovani Rosaod MD;  Location:  [facility]    ESOPHAGOSCOPY, GASTROSCOPY, DUODENOSCOPY (EGD), COMBINED  2012    Procedure: COMBINED ESOPHAGOSCOPY, GASTROSCOPY, DUODENOSCOPY (EGD), BIOPSY SINGLE OR MULTIPLE;  EGD-GERD;  Surgeon: Teo Alvarado MD;  Location: [facility]    HYSTERECTOMY, PAP NO LONGER INDICATED  2006    vaginal hyst with bilateral sal-ooph, HSIL    LIGATN/STRIP LONG OR SHORT SAPHEN      right leg    SURGICAL HISTORY OF -       conization for abnl PAP      Allergies   Allergen Reactions    Lipitor [Atorvastatin Calcium]      cramps    Sulfa Antibiotics Rash      Social History     Tobacco Use    Smoking status: Never    Smokeless tobacco: Never   Substance Use Topics    Alcohol use: No      Wt Readings from Last 1 Encounters:   24 80.1 kg (176 lb 9.6 oz)        Anesthesia Evaluation   Pt has had prior anesthetic. Type: General and MAC.    No history of anesthetic complications       ROS/MED  HX  ENT/Pulmonary:  - neg pulmonary ROS     Neurologic:  - neg neurologic ROS     Cardiovascular: Comment: - Mitral valve prolapse     (+)  hypertension- -   -  - -                                 Previous cardiac testing   Echo: Date: 01/19/24 Results:  Interpretation Summary  Global and regional left ventricular function is normal with an EF of 60-65%.  Right ventricular function, chamber size, wall motion, and thickness are  normal.  Mild aortic valve calcification is present.  Mild aortic stenosis is present. Vmax 2.2 m/s.  No significant valvular abnormalities present.  No pericardial effusion is present.  Previous study not available for comparison.       Stress Test:  Date: Results:    ECG Reviewed:  Date: Results:    Cath:  Date: Results:      METS/Exercise Tolerance:     Hematologic:       Musculoskeletal:   (+)  arthritis,             GI/Hepatic:     (+) GERD, Asymptomatic on medication,                  Renal/Genitourinary:       Endo:     (+)               Obesity,       Psychiatric/Substance Use:  - neg psychiatric ROS     Infectious Disease:  - neg infectious disease ROS     Malignancy:  - neg malignancy ROS     Other:  - neg other ROS          Physical Exam    Airway        Mallampati: II   TM distance: > 3 FB   Neck ROM: full   Mouth opening: > 3 cm    Respiratory Devices and Support         Dental       (+) Minor Abnormalities - some fillings, tiny chips      Cardiovascular             Pulmonary                   OUTSIDE LABS:  CBC:   Lab Results   Component Value Date    WBC 6.2 03/03/2023    WBC 8.4 07/31/2021    HGB 14.1 03/03/2023    HGB 14.7 07/31/2021    HCT 43.0 03/03/2023    HCT 44.7 07/31/2021     03/03/2023     07/31/2021     BMP:   Lab Results   Component Value Date     06/01/2024     05/06/2024    POTASSIUM 4.4 06/01/2024    POTASSIUM 4.2 05/06/2024    CHLORIDE 102 06/01/2024    CHLORIDE 107 05/06/2024    CO2 23 06/01/2024    CO2 19 (L) 05/06/2024    BUN 18.4  "06/01/2024    BUN 27.1 (H) 05/06/2024    CR 0.98 (H) 06/01/2024    CR 1.15 (H) 05/06/2024     (H) 06/01/2024     (H) 05/06/2024     COAGS:   Lab Results   Component Value Date    PTT 35 07/31/2021    INR 1.04 07/31/2021     POC: No results found for: \"BGM\", \"HCG\", \"HCGS\"  HEPATIC:   Lab Results   Component Value Date    ALBUMIN 4.6 05/06/2024    PROTTOTAL 7.4 05/06/2024    ALT 18 05/06/2024    AST 22 05/06/2024    ALKPHOS 85 05/06/2024    BILITOTAL 0.4 05/06/2024     OTHER:   Lab Results   Component Value Date    A1C 6.2 (H) 03/30/2024    ROXANNE 9.9 06/01/2024    PHOS 3.3 03/03/2023    TSH 1.72 03/03/2023    T4 0.85 03/03/2023    T3 94 11/19/2012    CRP 4.3 10/09/2017    SED 14 05/06/2017       Anesthesia Plan    ASA Status:  2    NPO Status:  NPO Appropriate    Anesthesia Type: MAC.     - Reason for MAC: immobility needed   Induction: Propofol, Intravenous.   Maintenance: TIVA.        Consents    Anesthesia Plan(s) and associated risks, benefits, and realistic alternatives discussed. Questions answered and patient/representative(s) expressed understanding.     - Discussed: Risks, Benefits and Alternatives for BOTH SEDATION and the PROCEDURE were discussed     - Discussed with:  Patient      - Extended Intubation/Ventilatory Support Discussed: No.      - Patient is DNR/DNI Status: No     Use of blood products discussed: No .     Postoperative Care            Comments:    Other Comments: The risks and benefits of anesthesia, and the alternatives where applicable, have been discussed with the patient, and they wish to proceed.              KD Irizarry CRNA    I have reviewed the pertinent notes and labs in the chart from the past 30 days and (re)examined the patient.  Any updates or changes from those notes are reflected in this note.                  "

## 2024-06-12 NOTE — ANESTHESIA POSTPROCEDURE EVALUATION
Patient: Nannette Awad    Procedure: Procedure(s):  COLONOSCOPY, FLEXIBLE, WITH LESION REMOVAL USING SNARE       Anesthesia Type:  MAC    Note:  Disposition: Outpatient   Postop Pain Control: Uneventful            Sign Out: Well controlled pain   PONV: No   Neuro/Psych: Uneventful            Sign Out: Acceptable/Baseline neuro status   Airway/Respiratory: Uneventful            Sign Out: Acceptable/Baseline resp. status   CV/Hemodynamics: Uneventful            Sign Out: Acceptable CV status   Other NRE: NONE   DID A NON-ROUTINE EVENT OCCUR? No    Event details/Postop Comments:  Pt was happy with anesthesia care.  No complications.  I will follow up with the pt if needed.           Last vitals:  Vitals Value Taken Time   /56 06/12/24 1200   Temp     Pulse 74 06/12/24 1200   Resp     SpO2 97 % 06/12/24 1211   Vitals shown include unfiled device data.    Electronically Signed By: KD Irizarry CRNA  June 12, 2024  12:27 PM

## 2024-06-12 NOTE — DISCHARGE INSTRUCTIONS
[facility]    Home Care Following Endoscopy          Activity:  You have just undergone an endoscopic procedure usually performed with conscious sedation.  Do not work or operate machinery (including a car) for at least 12 hours.    I encourage you to walk and attempt to pass this air as soon as possible.    Diet:  Return to the diet you were on before your procedure but eat lightly for the first 12-24 hours.  Drink plenty of water.  Resume any regular medications unless otherwise advised by your physician.  Please begin any new medication prescribed as a result of your procedure as directed by your physician.   If you had any biopsy or polyp removed please refrain from aspirin or aspirin products for 2 days.  If on Coumadin please restart as instructed by your physician.   Pain:  You may take Tylenol as needed for pain.  Expected Recovery:  You can expect some mild abdominal fullness and/or discomfort due to the air used to inflate your intestinal tract. It is also normal to have a mild sore throat after upper endoscopy.    Call Your Physician if You Have:  After Colonoscopy:  Worsening persisting abdominal pain which is worse with activity.  Fevers (>101 degrees F), chills or shakes.  Passage of continued blood with bowel movements.     Any questions or concerns about your recovery, please call [phone] or after hours [phone] ([phone]) Nurse Advice Line.    Follow-up Care:  You did have polyps/biopsy tissue sample(s) removed.  The polyps/biopsy tissue sample(s) will be sent to pathology.    You should receive letter in your My Chart from Dr. Baez with your results within 1-2 weeks. If you do not participate in My Chart a physical letter will come in the mail in 2-3 weeks.  Please call if you have not received a notification of your results.  If asked to return to clinic please make an appointment 1 week after your procedure.  Call [phone].

## 2024-06-12 NOTE — LETTER
July 1, 2024      Corrina AMI Camps  [address] [facility] [address]        Dear ,    We are writing to inform you of your test results.    Several benign polyps removed.  A repeat exam in 3 yrs is suggested.    Resulted Orders   Surgical Pathology Exam   Result Value Ref Range    Case Report       Surgical Pathology Report                         Case: [id number]                                  Authorizing Provider:  Tolu Baez MD        Collected:           06/12/2024 11:26 AM          Ordering Location:     [facility]          Received:            06/12/2024 01:15 PM                                 [facility]                                                          Pathologist:           Lucía Stephenson MD                                                          Specimens:   A) - Large Intestine, Colon, Cecum, cecal polyps                                                    B) - Large Intestine, Colon, Ascending, ascending colon polyps                                      C) - Large Intestine, Colon, Sigmoid, sigmoid colon polyp                                  Final Diagnosis       A(1).  Colon, Cecum, polyps x3, polypectomy:  -Tubular adenomas  -Negative for high-grade dysplasia and malignancy.      B(2).   Colon, Ascending, polyps x2, polypectomy:  -Tubular adenoma  -Negative for high-grade dysplasia and malignancy.    -Sessile serrated adenoma  -Negative for conventional dysplasia and malignancy      C(3).   Colon, Sigmoid, polyp, polypectomy:  -Fragments of tubular adenoma  -Negative for high-grade dysplasia and malignancy.          Clinical Information       Procedure:  COLONOSCOPY, FLEXIBLE, WITH LESION REMOVAL USING SNARE  Pre-op Diagnosis: Screen for colon cancer [Z12.11]  Post-op Diagnosis: Z12.11 - Screen for colon cancer [ICD-10-CM]      Gross Description       A(1). Large Intestine, Colon, Cecum, cecal polyps:  The specimen is received in formalin, labeled with the  patient's name, medical record number and other identifying information and designated  cecum polyp . It consists of 3 tan soft tissue fragments ranging from 0.3-0.6 cm. Entirely submitted in one cassette.    B(2). Large Intestine, Colon, Ascending, ascending colon polyps:  The specimen is received in formalin, labeled with the patient's name, medical record number and other identifying information and designated  ascending colon polyp . It consists of 2 tan soft tissue fragments ranging from 0.4-0.8 cm. Entirely submitted in one cassette.    C(3). Large Intestine, Colon, Sigmoid, sigmoid colon polyp:  The specimen is received in formalin, labeled with the patient's name, medical record number and other identifying information and designated  sigmoid colon polyp . It consists of 4 tan soft tissue fragments ranging from 0.3-0.9 cm. Entirely submitted in one cassette.   ANGEL Bañuelos([facility]) 6/13/2024 8:09 AM       Microscopic Description       Microscopic examination was performed.      Performing Labs       The technical component of this testing was completed at [facility].    Stain controls for all stains resulted within this report have been reviewed and show appropriate reactivity.       Case Images         If you have any questions or concerns, please call the clinic at the number listed above.       Sincerely,      Tolu Baez MD

## 2024-06-14 LAB
PATH REPORT.COMMENTS IMP SPEC: NORMAL
PATH REPORT.COMMENTS IMP SPEC: NORMAL
PATH REPORT.FINAL DX SPEC: NORMAL
PATH REPORT.GROSS SPEC: NORMAL
PATH REPORT.MICROSCOPIC SPEC OTHER STN: NORMAL
PATH REPORT.RELEVANT HX SPEC: NORMAL
PHOTO IMAGE: NORMAL

## 2024-06-19 DIAGNOSIS — M85.80 OSTEOPENIA WITH HIGH RISK OF FRACTURE: Primary | ICD-10-CM

## 2024-06-21 ENCOUNTER — ANCILLARY PROCEDURE (OUTPATIENT)
Dept: MRI IMAGING | Facility: CLINIC | Age: 69
End: 2024-06-21
Attending: NURSE PRACTITIONER
Payer: COMMERCIAL

## 2024-06-21 DIAGNOSIS — M54.14 THORACIC RADICULOPATHY: ICD-10-CM

## 2024-06-21 PROCEDURE — 72146 MRI CHEST SPINE W/O DYE: CPT | Mod: TC | Performed by: RADIOLOGY

## 2024-07-01 ENCOUNTER — TELEPHONE (OUTPATIENT)
Dept: NEUROLOGY | Facility: CLINIC | Age: 69
End: 2024-07-01
Payer: COMMERCIAL

## 2024-07-01 NOTE — TELEPHONE ENCOUNTER
Reviewed results as stated below.     MRI THORACIC SPINE WITHOUT CONTRAST  6/21/2024 2:50 PM                                                             IMPRESSION:  1. Multilevel degenerative changes of the thoracic spine, as  described.  2. No high-grade spinal canal or neural foraminal stenosis.     Called patient, no answer, LVM.    Recommend to schedule appts with the referrals placed at recent office visit:   -Neurology   -[facility]   -PT     Kate Burroughs CNP  [facility]  Tel [phone]  Pager [phone]

## 2024-07-02 NOTE — TELEPHONE ENCOUNTER
[facility] Call Center    Phone Message    May a detailed message be left on voicemail: yes     Reason for Call: Other: Patient called back Kate to discuss. Please call when available.      Action Taken: Message routed to:  Other: Neurosurgery    Travel Screening: Not Applicable     Date of Service:

## 2024-07-08 NOTE — TELEPHONE ENCOUNTER
Reviewed results as stated below.      MRI THORACIC SPINE WITHOUT CONTRAST  6/21/2024 2:50 PM                                                             IMPRESSION:  1. Multilevel degenerative changes of the thoracic spine, as  described.  2. No high-grade spinal canal or neural foraminal stenosis.     Called patient. We discussed symptoms and imaging results.   Recommend to schedule appts with the referrals placed at recent office visit:   -Neurology   -[facility]   -PT     Advised patient to call our clinic with any questions or concerns. She voiced agreement.      Kate Burroughs, CNP  [facility]  Tel [phone]  Pager [phone]

## 2024-08-26 DIAGNOSIS — I10 HYPERTENSION GOAL BP (BLOOD PRESSURE) < 150/90: ICD-10-CM

## 2024-08-29 RX ORDER — LISINOPRIL 10 MG/1
TABLET ORAL
Qty: 90 TABLET | Refills: 0 | Status: SHIPPED | OUTPATIENT
Start: 2024-08-29

## 2024-09-24 DIAGNOSIS — R82.994 HYPERCALCIURIA: ICD-10-CM

## 2024-09-24 DIAGNOSIS — M85.9 LOW BONE DENSITY: ICD-10-CM

## 2024-09-24 RX ORDER — ALENDRONATE SODIUM 70 MG/1
70 TABLET ORAL
Qty: 12 TABLET | Refills: 1 | Status: SHIPPED | OUTPATIENT
Start: 2024-09-24

## 2024-11-06 ENCOUNTER — LAB (OUTPATIENT)
Dept: LAB | Facility: CLINIC | Age: 69
End: 2024-11-06
Payer: COMMERCIAL

## 2024-11-06 ENCOUNTER — ALLIED HEALTH/NURSE VISIT (OUTPATIENT)
Dept: FAMILY MEDICINE | Facility: CLINIC | Age: 69
End: 2024-11-06
Payer: COMMERCIAL

## 2024-11-06 VITALS — SYSTOLIC BLOOD PRESSURE: 138 MMHG | DIASTOLIC BLOOD PRESSURE: 70 MMHG | HEART RATE: 76 BPM

## 2024-11-06 DIAGNOSIS — M85.9 LOW BONE DENSITY: ICD-10-CM

## 2024-11-06 DIAGNOSIS — Z01.30 BLOOD PRESSURE CHECK: Primary | ICD-10-CM

## 2024-11-06 DIAGNOSIS — M85.80 OSTEOPENIA WITH HIGH RISK OF FRACTURE: ICD-10-CM

## 2024-11-06 PROCEDURE — 82306 VITAMIN D 25 HYDROXY: CPT

## 2024-11-06 PROCEDURE — 80048 BASIC METABOLIC PNL TOTAL CA: CPT

## 2024-11-06 PROCEDURE — 99207 PR NO BILLABLE SERVICE THIS VISIT: CPT | Mod: 25 | Performed by: FAMILY MEDICINE

## 2024-11-06 PROCEDURE — 36415 COLL VENOUS BLD VENIPUNCTURE: CPT

## 2024-11-06 PROCEDURE — 82040 ASSAY OF SERUM ALBUMIN: CPT

## 2024-11-06 NOTE — PROGRESS NOTES
Nannette Awad was evaluated at [facility] on November 6, 2024 at which time her blood pressure was:    BP Readings from Last 3 Encounters:   11/06/24 138/70   06/12/24 103/56   04/26/24 127/70     Pulse Readings from Last 3 Encounters:   11/06/24 76   06/12/24 74   04/26/24 71       Reviewed lifestyle modifications for blood pressure control and reduction: including making healthy food choices, managing weight, getting regular exercise, smoking cessation, reducing alcohol consumption, monitoring blood pressure regularly.     Symptoms: None    BP Goal:< 140/90 mmHg    BP Assessment:  BP at goal    Potential Reasons for BP too high: NA - Not applicable    BP Follow-Up Plan: Recheck BP in 6 months at pharmacy    Recommendation to Provider: None    Note completed by: Tanvir Bentley RPh.  [facility]  [phone]

## 2024-11-07 LAB
ALBUMIN SERPL BCG-MCNC: 4.5 G/DL (ref 3.5–5.2)
ANION GAP SERPL CALCULATED.3IONS-SCNC: 13 MMOL/L (ref 7–15)
BUN SERPL-MCNC: 20.3 MG/DL (ref 8–23)
CALCIUM SERPL-MCNC: 10.5 MG/DL (ref 8.8–10.4)
CHLORIDE SERPL-SCNC: 103 MMOL/L (ref 98–107)
CREAT SERPL-MCNC: 1.07 MG/DL (ref 0.51–0.95)
EGFRCR SERPLBLD CKD-EPI 2021: 55 ML/MIN/1.73M2
GLUCOSE SERPL-MCNC: 170 MG/DL (ref 70–99)
HCO3 SERPL-SCNC: 23 MMOL/L (ref 22–29)
POTASSIUM SERPL-SCNC: 4.3 MMOL/L (ref 3.4–5.3)
SODIUM SERPL-SCNC: 139 MMOL/L (ref 135–145)
VIT D+METAB SERPL-MCNC: 53 NG/ML (ref 20–50)

## 2024-11-18 ENCOUNTER — MYC MEDICAL ADVICE (OUTPATIENT)
Dept: ENDOCRINOLOGY | Facility: CLINIC | Age: 69
End: 2024-11-18
Payer: COMMERCIAL

## 2024-11-18 DIAGNOSIS — E83.52 HYPERCALCEMIA: ICD-10-CM

## 2024-11-18 DIAGNOSIS — M85.9 LOW BONE DENSITY: Primary | ICD-10-CM

## 2024-11-18 DIAGNOSIS — R94.6 ABNORMAL FINDING ON THYROID FUNCTION TEST: ICD-10-CM

## 2024-11-18 DIAGNOSIS — M85.80 OSTEOPENIA WITH HIGH RISK OF FRACTURE: ICD-10-CM

## 2024-11-21 ENCOUNTER — MYC MEDICAL ADVICE (OUTPATIENT)
Dept: FAMILY MEDICINE | Facility: CLINIC | Age: 69
End: 2024-11-21
Payer: COMMERCIAL

## 2024-11-21 DIAGNOSIS — I10 HYPERTENSION GOAL BP (BLOOD PRESSURE) < 150/90: ICD-10-CM

## 2024-11-21 NOTE — TELEPHONE ENCOUNTER
Routing to PCP to advise if willing to take over prescribing this medications from cardiology.      If so, please sign refill to pharmacy.    Jewell Bass BSN, RN

## 2024-11-24 RX ORDER — LISINOPRIL 10 MG/1
TABLET ORAL
Qty: 90 TABLET | Refills: 0 | Status: SHIPPED | OUTPATIENT
Start: 2024-11-24

## 2024-12-10 ENCOUNTER — NURSE TRIAGE (OUTPATIENT)
Dept: FAMILY MEDICINE | Facility: CLINIC | Age: 69
End: 2024-12-10
Payer: COMMERCIAL

## 2024-12-10 NOTE — TELEPHONE ENCOUNTER
COVID Positive/Requesting COVID treatment    Patient is positive for COVID and requesting treatment options.    Date of positive COVID test (PCR or at home)? Today at home (12/10/24)  Current COVID symptoms: fever or chills, cough, fatigue, muscle or body aches, headache, sore throat, and congestion or runny nose  Date COVID symptoms began: Last night 12/9/24    Message should be routed to clinic RN pool. Best phone number to use for call back: [phone]    RN COVID TREATMENT VISIT  12/10/24      The patient has been triaged and does not require a higher level of care.    Nannette Awad  69 year old  Current weight? 176    Has the patient been seen by a primary care provider at an [facility] or [facility] within the past two years? Yes.   Have you been in close proximity to/do you have a known exposure to a person with a confirmed case of influenza? No.     General treatment eligibility:  Date of positive COVID test (PCR or at home)?  Today, 12/10/24    Are you or have you been hospitalized for this COVID-19 infection? No.   Have you received monoclonal antibodies or antiviral treatment for COVID-19 since this positive test? No.   Do you have any of the following conditions that place you at risk of being very sick from COVID-19?   - Age 50 years or older  - Chronic kidney disease (mild to moderate; eGFR or GFR 30-59 mL/min)  - Heart conditions such as cardiomyopathies, congenital heart defects, coronary artery disease, heart arrhythmias, heart failure, hypertension, valve disorders   - Overweight or Obesity (BMI >85th percentile or BMI 25 or higher)  Yes, patient has at least one high risk condition as noted above.     Current COVID symptoms:   - fever or chills  - cough  - fatigue  - muscle or body aches  - headache  - sore throat  - congestion or runny nose  Yes. Patient has at least one symptom as selected.     How many days since symptoms started? 5 days or less. Established  patient, 12 years or older weighing at least 88.2 lbs, who has symptoms that started in the past 5 days, has not been hospitalized nor received treatment already, and is at risk for being very sick from COVID-19.     Treatment eligibility by RN:  Are you currently pregnant or nursing? No  Do you have a clinically significant hypersensitivity to nirmatrelvir or ritonavir, or toxic epidermal necrolysis (TEN) or Jones-Reynaldo Syndrome? No  Do you have a history of hepatitis, any hepatic impairment on the Problem List (such as Child-Moseley Class C, cirrhosis, fatty liver disease, alcoholic liver disease), or was the last liver lab (hepatic panel, ALT, AST, ALK Phos, bilirubin) elevated in the past 6 months? No  Do you have any history of severe renal impairment (eGFR < 30mL/min)? No    Is patient eligible to continue? Yes, patient meets all eligibility requirements for the RN COVID treatment (as denoted by all no responses above).     Current Outpatient Medications   Medication Sig Dispense Refill    alendronate (FOSAMAX) 70 MG tablet Take 1 tablet (70 mg) by mouth every 7 days. Take 60 minutes before am meal with 8 oz. water. Remain upright for 30 minutes. 12 tablet 1    amLODIPine (NORVASC) 10 MG tablet Take 1 tablet (10 mg) by mouth every morning      Calcium Carbonate-Vitamin D (CALCIUM 500 + D PO) Take 500 mg by mouth 2 times daily 2 tablets bid      lisinopril (ZESTRIL) 10 MG tablet Take one tablet daily. Please have your primary care refill moving forward or call to schedule in cardiology 90 tablet 0    MULTI-DAY OR daily      omeprazole (PRILOSEC) 20 MG DR capsule TAKE 1 CAPSULE EVERY DAY 90 capsule 3    pravastatin (PRAVACHOL) 40 MG tablet TAKE 1 TABLET EVERY DAY 90 tablet 3    SUMAtriptan (IMITREX) 50 MG tablet TAKE 1 TABLET  AT ONSET OF HEADACHE FOR MIGRAINE MAY REPEAT IN 2 HOURS. MAX 4 TABLETS/24 HOURS. 27 tablet 1    triamcinolone (KENALOG) 0.1 % external cream Apply topically 2 times daily To rash on chest  and affected area on vulva 45 g 2    VITAMIN D 1000 UNIT OR TABS 1 TABLET DAILY      zolpidem (AMBIEN) 5 MG tablet Take 1 tablet (5 mg) by mouth nightly as needed for sleep 90 tablet 1       Medications from List 1 of the standing order (on medications that exclude the use of Paxlovid) that patient is taking: NONE. Is patient taking Curtice's Wort? No  Is patient taking Carisa's Wort or any meds from List 1? No.   Medications from List 2 of the standing order (on meds that provider needs to adjust) that patient is taking: amlodipine (Norvasc), explained a provider visit is necessary to discuss medication adjustments while taking Paxlovid. Is patient on any of the meds from List 2? Yes. Patient will be scheduled for virtual visit.    Scheduled patient for a virtual telephone visit with primary care provider at [facility] for tomorrow afternoon, 3:00 PM.      Millie Ham RN  Clinical Triage/Primary Care  [facility]        Reason for Disposition   HIGH RISK patient (e.g., weak immune system, age > 64 years, obesity with BMI of 30 or higher, pregnant, chronic lung disease) and COVID symptoms (e.g., cough, fever) (Exceptions: Already seen by doctor or NP/PA and no new or worsening symptoms.)    Additional Information   Negative: SEVERE difficulty breathing (e.g., struggling for each breath, speaks in single words)   Negative: Difficult to awaken or acting confused (e.g., disoriented, slurred speech)   Negative: Bluish (or gray) lips or face now   Negative: Shock suspected (e.g., cold/pale/clammy skin, too weak to stand, low BP, rapid pulse)   Negative: Sounds like a life-threatening emergency to the triager   Negative: Diagnosed or suspected COVID-19 and symptoms lasting 3 or more weeks   Negative: COVID-19 exposure and no symptoms   Negative: COVID-19 vaccine reaction suspected (e.g., fever, headache, muscle aches) occurring 1 to 3 days after getting vaccine   Negative: COVID-19 vaccine,  "questions about   Negative: Exposure to someone known to have influenza (flu test positive) and flu-like symptoms (e.g., cough, runny nose, sore throat, SOB; with or without fever)   Negative: Possible COVID-19 symptoms and triager concerned about severity of symptoms or other causes   Negative: COVID-19 and breastfeeding, questions about   Negative: SEVERE or constant chest pain or pressure  (Exception: Mild central chest pain, present only when coughing.)   Negative: MODERATE difficulty breathing (e.g., speaks in phrases, SOB even at rest, pulse 100-120)   Negative: Headache and stiff neck (can't touch chin to chest)   Negative: Oxygen level (e.g., pulse oximetry) 90% or lower   Negative: Chest pain or pressure  (Exception: MILD central chest pain, present only when coughing.)   Negative: Drinking very little and dehydration suspected (e.g., no urine > 12 hours, very dry mouth, very lightheaded)   Negative: MILD difficulty breathing (e.g., minimal/no SOB at rest, SOB with walking, pulse <100)   Negative: Fever > 103 F (39.4 C)   Negative: Fever > 101 F (38.3 C) and over 60 years of age   Negative: Fever > 100 F (37.8 C) and bedridden (e.g., CVA, chronic illness, recovering from surgery)    Answer Assessment - Initial Assessment Questions  1. SYMPTOMS: \"What is your main symptom or concern?\" (e.g., cough, fever, shortness of breath, muscle aches)      Pressure and pain in head, sore throat  2. ONSET: \"When did the symptoms start?\"       Yesterday evening  3. COUGH: \"Do you have a cough?\" If Yes, ask: \"How bad is the cough?\"        Yes, mild  4. FEVER: \"Do you have a fever?\" If Yes, ask: \"What is your temperature, how was it measured, and when did it start?\"      Not at this time but does have chills  5. BREATHING DIFFICULTY: \"Are you having any difficulty breathing?\" (e.g., normal; shortness of breath, wheezing, unable to speak)       Not having trouble breathing   6. BETTER-SAME-WORSE: \"Are you getting better, " "staying the same or getting worse compared to yesterday?\"  If getting worse, ask, \"In what way?\"      Feels slightly better because is up and moving around today  7. OTHER SYMPTOMS: \"Do you have any other symptoms?\"  (e.g., chills, fatigue, headache, loss of smell or taste, muscle pain, sore throat)      Chills, cough, fatigue, muscle or body aches, headache, sore throat, runny nose.  8. COVID-19 DIAGNOSIS: \"How do you know that you have COVID?\" (e.g., positive lab test or self-test, diagnosed by doctor or NP/PA, symptoms after exposure).      Tested today at home, positive test  9. COVID-19 EXPOSURE: \"Was there any known exposure to COVID before the symptoms began?\"       Yes, spouse currently has COVID  10. COVID-19 VACCINE: \"Have you had the COVID-19 vaccine?\" If Yes, ask: \"When did you last get it?\"        Yes, vaccinated and boosted.   11. HIGH RISK DISEASE: \"Do you have any chronic medical problems?\" (e.g., asthma, heart or lung disease, weak immune system, obesity, etc.)        Age, HTN, Obesity  12. PREGNANCY: \"Is there any chance you are pregnant?\" \"When was your last menstrual period?\"        N/A  13. O2 SATURATION MONITOR:  \"Do you use an oxygen saturation monitor (pulse oximeter) at home?\" If Yes, ask \"What is your reading (oxygen level) today?\" \"What is your usual oxygen saturation reading?\" (e.g., 95%)        No, doesn't have    Protocols used: COVID-19 - Diagnosed or [id number]    "

## 2024-12-11 ENCOUNTER — VIRTUAL VISIT (OUTPATIENT)
Dept: FAMILY MEDICINE | Facility: CLINIC | Age: 69
End: 2024-12-11
Payer: COMMERCIAL

## 2024-12-11 DIAGNOSIS — U07.1 COVID-19 VIRUS INFECTION: Primary | ICD-10-CM

## 2024-12-11 PROCEDURE — 99442 PR PHYSICIAN TELEPHONE EVALUATION 11-20 MIN: CPT | Mod: 93 | Performed by: PHYSICIAN ASSISTANT

## 2024-12-11 NOTE — PATIENT INSTRUCTIONS
Merline Houser,    Thank you for allowing [facility] to manage your care.    Discontinue your amlodipine and zolpidem for the next 8 days after beginning Paxlovid.  Search for PAXCESS on the internet to get a coupon for this med.    If you develop worsening/changing symptoms at any time, please be seen in clinic/urgent care or call 911/go to the emergency department for evaluation as we discussed.    I sent your prescriptions to your pharmacy.    Drink 8-10 glasses of fluid daily to stay well-hydrated.    If you have any questions or concerns, please feel free to call us at [phone]    Sincerely,    Osmin Meyers PA-C    Did you know?      You can schedule a video visit for follow-up appointments as well as future appointments for certain conditions.  Please see the below link.     [email or web address]    If you have not already done so,  I encourage you to sign up for [email or web address] ([email or web address]).  This will allow you to review your results, securely communicate with a provider, and schedule virtual visits as well.

## 2024-12-11 NOTE — PROGRESS NOTES
"Corrina is a 69 year old who is being evaluated via a billable telephone visit.    What phone number would you like to be contacted at? [phone]   How would you like to obtain your AVS? Radha  Originating Location (pt. Location): Home  Distant Location (provider location):  On-site    Assessment & Plan   Problem List Items Addressed This Visit    None  Visit Diagnoses       COVID-19 virus infection    -  Primary    Relevant Medications    nirmatrelvir and ritonavir (PAXLOVID) 150 mg/100 mg therapy pack           Impression is COVID-19. Positive home test. Fully vaccinated and boosted x 4 against COVID-19. Sounds well and non-toxic and I have low suspicion for impending airway obstruction or respiratory distress at this point.  She will push p.o. fluids, use over-the-counter meds for symptoms, complete a course of renally dosed Paxlovid after discussing risks/benefits, and follow-up with us in 2 weeks if not improving or urgent care/the ER if symptoms worsen/change at any time.    DDx and Dx discussed with and explained to the pt to their satisfaction.  All questions were answered at this time. Pt expressed understanding of and agreement with this dx, tx, and plan. No further workup warranted and standard medication warnings given. I have given the patient a list of pertinent indications for re-evaluation. Will go to the Emergency Department if symptoms worsen or new concerning symptoms arise. Patient left the call in no apparent distress.      Prescription drug management  16 minutes spent by me on the date of the encounter doing chart review, history and exam, documentation and further activities per the note  BMI  Estimated body mass index is 30.31 kg/m  as calculated from the following:    Height as of 4/3/24: 1.626 m (5' 4\").    Weight as of 4/3/24: 80.1 kg (176 lb 9.6 oz).     See Patient Instructions  COVID-19 positive patient.  Encounter for consideration of medication intervention. Patient does qualify for " "a prescription. Full discussion with patient including medication options, risks and benefits. Potential drug interactions reviewed with patient.     Treatment Planned Paxlovid at decreased dosing due to renal insufficiency  Temporary change to home medications: no ambien or amlodipine for 8 days    Estimated body mass index is 30.31 kg/m  as calculated from the following:    Height as of 4/3/24: 1.626 m (5' 4\").    Weight as of 4/3/24: 80.1 kg (176 lb 9.6 oz).  GFR Estimate   Date Value Ref Range Status   11/06/2024 55 (L) >60 mL/min/1.73m2 Final     Comment:     eGFR calculated using 2021 CKD-EPI equation.   03/04/2021 73 >60 mL/min/[1.73_m2] Final     Comment:     Non  GFR Calc  Starting 12/18/2018, serum creatinine based estimated GFR (eGFR) will be   calculated using the Chronic Kidney Disease Epidemiology Collaboration   (CKD-EPI) equation.       No results found for: \"[id number]\"      Subjective   Pat is a 69 year old, presenting for the following health issues:  Covid Concern      12/11/2024     1:20 PM   Additional Questions   Roomed by Jayce Torres CMA   Accompanied by N/A         12/11/2024     1:20 PM   Patient Reported Additional Medications   Patient reports taking the following new medications No new medications     HPI     Patient is calling in today due to a positive covid test from noon yesterday.  Patients symptoms started on Monday night around 8 pm.  Symptoms include headache, sinus pressure, eye pain, ear pain, sore throat, occasional dry cough, muscle aches/pain, but no rash or fever.  Patient also states that she did not take her amlodipine this morning.      COVID-19 Symptom Review  How many days ago did these symptoms start? 2    Are any of the following symptoms significant for you?  New or worsening difficulty breathing? No  Worsening cough? Yes, it's a dry cough.   Fever or chills? No  Headache: YES  Sore throat: YES  Chest pain: No  Diarrhea: No  Body aches? " YES    What treatments has patient tried? Excedrin for headache and a decongestant   Does patient live in a nursing home, group home, or shelter? No  Does patient have a way to get food/medications during quarantined? Yes, I have a friend or family member who can help me.      Review of Systems  Constitutional, HEENT, cardiovascular, pulmonary, gi and gu systems are negative, except as otherwise noted.      Objective           Vitals:  No vitals were obtained today due to virtual visit.    Physical Exam   General: Alert and no distress //Respiratory: No audible wheeze, cough, or shortness of breath // Psychiatric:  Appropriate affect, tone, and pace of words      Phone call duration: 12 minutes  Signed Electronically by: ANGEL Villar

## 2025-01-02 ENCOUNTER — TRANSFERRED RECORDS (OUTPATIENT)
Dept: HEALTH INFORMATION MANAGEMENT | Facility: CLINIC | Age: 70
End: 2025-01-02
Payer: COMMERCIAL

## 2025-01-15 ENCOUNTER — OFFICE VISIT (OUTPATIENT)
Dept: FAMILY MEDICINE | Facility: CLINIC | Age: 70
End: 2025-01-15
Payer: COMMERCIAL

## 2025-01-15 VITALS
TEMPERATURE: 97.4 F | RESPIRATION RATE: 20 BRPM | HEART RATE: 82 BPM | DIASTOLIC BLOOD PRESSURE: 52 MMHG | WEIGHT: 161 LBS | BODY MASS INDEX: 27.49 KG/M2 | HEIGHT: 64 IN | OXYGEN SATURATION: 99 % | SYSTOLIC BLOOD PRESSURE: 142 MMHG

## 2025-01-15 DIAGNOSIS — R53.83 OTHER FATIGUE: Primary | ICD-10-CM

## 2025-01-15 DIAGNOSIS — L90.0 LICHEN SCLEROSUS: ICD-10-CM

## 2025-01-15 DIAGNOSIS — Z29.11 NEED FOR VACCINATION AGAINST RESPIRATORY SYNCYTIAL VIRUS: ICD-10-CM

## 2025-01-15 DIAGNOSIS — B36.0 TINEA VERSICOLOR: ICD-10-CM

## 2025-01-15 DIAGNOSIS — L30.9 DERMATITIS: ICD-10-CM

## 2025-01-15 DIAGNOSIS — N18.31 CHRONIC KIDNEY DISEASE, STAGE 3A (H): ICD-10-CM

## 2025-01-15 DIAGNOSIS — I10 HYPERTENSION GOAL BP (BLOOD PRESSURE) < 140/90: ICD-10-CM

## 2025-01-15 DIAGNOSIS — R79.9 ABNORMAL FINDING OF BLOOD CHEMISTRY, UNSPECIFIED: ICD-10-CM

## 2025-01-15 LAB
ERYTHROCYTE [DISTWIDTH] IN BLOOD BY AUTOMATED COUNT: 13.6 % (ref 10–15)
HCT VFR BLD AUTO: 45.8 % (ref 35–47)
HGB BLD-MCNC: 14.8 G/DL (ref 11.7–15.7)
MCH RBC QN AUTO: 29.2 PG (ref 26.5–33)
MCHC RBC AUTO-ENTMCNC: 32.3 G/DL (ref 31.5–36.5)
MCV RBC AUTO: 90 FL (ref 78–100)
PLATELET # BLD AUTO: 265 10E3/UL (ref 150–450)
RBC # BLD AUTO: 5.07 10E6/UL (ref 3.8–5.2)
WBC # BLD AUTO: 7.3 10E3/UL (ref 4–11)

## 2025-01-15 PROCEDURE — 85027 COMPLETE CBC AUTOMATED: CPT | Performed by: FAMILY MEDICINE

## 2025-01-15 PROCEDURE — 82306 VITAMIN D 25 HYDROXY: CPT | Performed by: FAMILY MEDICINE

## 2025-01-15 PROCEDURE — 84443 ASSAY THYROID STIM HORMONE: CPT | Performed by: FAMILY MEDICINE

## 2025-01-15 PROCEDURE — 82728 ASSAY OF FERRITIN: CPT | Performed by: FAMILY MEDICINE

## 2025-01-15 PROCEDURE — G2211 COMPLEX E/M VISIT ADD ON: HCPCS | Performed by: FAMILY MEDICINE

## 2025-01-15 PROCEDURE — 99214 OFFICE O/P EST MOD 30 MIN: CPT | Performed by: FAMILY MEDICINE

## 2025-01-15 PROCEDURE — 80048 BASIC METABOLIC PNL TOTAL CA: CPT | Performed by: FAMILY MEDICINE

## 2025-01-15 RX ORDER — KETOCONAZOLE 20 MG/G
CREAM TOPICAL DAILY
Qty: 30 G | Refills: 1 | Status: SHIPPED | OUTPATIENT
Start: 2025-01-15

## 2025-01-15 RX ORDER — LISINOPRIL 10 MG/1
TABLET ORAL
Qty: 90 TABLET | Refills: 1 | Status: SHIPPED | OUTPATIENT
Start: 2025-01-15

## 2025-01-15 RX ORDER — KETOCONAZOLE 20 MG/G
CREAM TOPICAL DAILY
Qty: 30 G | Refills: 1 | Status: SHIPPED | OUTPATIENT
Start: 2025-01-15 | End: 2025-01-15

## 2025-01-15 RX ORDER — ESTRADIOL 0.1 MG/G
CREAM VAGINAL
Qty: 42.5 G | Refills: 4 | Status: SHIPPED | OUTPATIENT
Start: 2025-01-15 | End: 2025-01-15

## 2025-01-15 RX ORDER — TRIAMCINOLONE ACETONIDE 1 MG/G
CREAM TOPICAL 2 TIMES DAILY
Qty: 30 G | Refills: 0 | Status: SHIPPED | OUTPATIENT
Start: 2025-01-15 | End: 2025-01-15

## 2025-01-15 RX ORDER — LATANOPROST 50 UG/ML
SOLUTION/ DROPS OPHTHALMIC
COMMUNITY
Start: 2024-11-25

## 2025-01-15 RX ORDER — AMLODIPINE BESYLATE 10 MG/1
10 TABLET ORAL EVERY MORNING
Qty: 90 TABLET | Refills: 1 | Status: SHIPPED | OUTPATIENT
Start: 2025-01-15

## 2025-01-15 RX ORDER — ESTRADIOL 0.1 MG/G
CREAM VAGINAL
Qty: 42.5 G | Refills: 4 | Status: SHIPPED | OUTPATIENT
Start: 2025-01-15 | End: 2025-02-12

## 2025-01-15 ASSESSMENT — PAIN SCALES - GENERAL: PAINLEVEL_OUTOF10: MODERATE PAIN (5)

## 2025-01-15 NOTE — PROGRESS NOTES
"  Assessment & Plan     (R53.83) Other fatigue  (primary encounter diagnosis)  (R79.9) Abnormal finding of blood chemistry, unspecified  Comment: fatigued  Plan: TSH with free T4 reflex, CBC with platelets,         Ferritin        Check labs today      (L90.0) Lichen sclerosus  Comment: likely mixed with steroid overuse  Plan: estradiol (ESTRACE) 0.1 MG/GM vaginal cream,         DISCONTINUED: estradiol (ESTRACE) 0.1 MG/GM         vaginal cream        Stop triamcinolone for at least 2 weeks, start estrace cream for vaginal atrophy and vulvitis    (I10) Hypertension goal BP (blood pressure) < 140/90  (N18.31) Chronic kidney disease, stage 3a (H)  Comment: to goal  Plan: amLODIPine (NORVASC) 10 MG tablet, lisinopril         (ZESTRIL) 10 MG tablet        Continue current meds   Refill x 6 months       (L30.9) Dermatitis  Comment: on hands, likely eczema  Plan: DISCONTINUED: triamcinolone (KENALOG) 0.1 %         external cream        Ok to use triamcinolone, reviewed need for drug holiday to avoid atrophy    (B36.0) Tinea versicolor  Comment: over chest  Plan: ketoconazole (NIZORAL) 2 % external cream,         DISCONTINUED: ketoconazole (NIZORAL) 2 %         external cream        Stop triamcinolone, start ketoconazole      (Z29.11) Need for vaccination against respiratory syncytial virus  Comment: high risk due to medical issues and due to spouse  Plan: RSV vaccine, bivalent, ABRYSVO, injection            The longitudinal plan of care for the diagnosis(es)/condition(s) as documented were addressed during this visit. Due to the added complexity in care, I will continue to support Corrina in the subsequent management and with ongoing continuity of care.      BMI  Estimated body mass index is 27.64 kg/m  as calculated from the following:    Height as of this encounter: 1.626 m (5' 4\").    Weight as of this encounter: 73 kg (161 lb).         See Patient Instructions    Subjective   Corrina is a 69 year old, presenting for the " "following health issues:  Hypertension and Rash (2 rashes; one in the vaginal area and bilateral hands/wrists)        1/15/2025    10:39 AM   Additional Questions   Roomed by paty   Accompanied by self         1/15/2025    10:39 AM   Patient Reported Additional Medications   Patient reports taking the following new medications see chart     History of Present Illness       Hypertension: She presents for follow up of hypertension.  She does check blood pressure  regularly outside of the clinic. Outside blood pressures have been over 140/90. She does not follow a low salt diet.     She eats 0-1 servings of fruits and vegetables daily.She consumes 2 sweetened beverage(s) daily.She exercises with enough effort to increase her heart rate 9 or less minutes per day.  She exercises with enough effort to increase her heart rate 3 or less days per week.   She is taking medications regularly.       Pt has several different rashes:  Small areas of redness on her hands/wrists.  Occas bump, very itchy  White patches on bilateral breasts and upper chest, spreading, no itch. Present for months.   Vulvar itch and rash with area of vagina that is sore. No discharge,  has been using triamcinalone daily for many weeks.    Continues to be fatigued, does care for her spouse with his medical issues.              Review of Systems  Constitutional, HEENT, cardiovascular, pulmonary, gi and gu systems are negative, except as otherwise noted.      Objective    BP (!) 142/52   Pulse 82   Temp 97.4  F (36.3  C) (Tympanic)   Resp 20   Ht 1.626 m (5' 4\")   Wt 73 kg (161 lb)   SpO2 99%   BMI 27.64 kg/m    Body mass index is 27.64 kg/m .  Physical Exam   GENERAL: alert and no distress  EYES: Eyes grossly normal to inspection, PERRL and conjunctivae and sclerae normal   (female): normal female external genitalia, normal urethral meatus , vaginal mucosal atrophy, and vulva with lichenification of labia major.   MS: no gross musculoskeletal " defects noted, no edema  SKIN:  Chest: scattered patches of hypopigmentation across chest and breasts.             Hands/wrist: erythematous, maculopapular rash  PSYCH: mentation appears normal, affect normal/bright            Signed Electronically by: Joy Dickson MD

## 2025-01-16 LAB
ANION GAP SERPL CALCULATED.3IONS-SCNC: 13 MMOL/L (ref 7–15)
BUN SERPL-MCNC: 18.9 MG/DL (ref 8–23)
CALCIUM SERPL-MCNC: 10.4 MG/DL (ref 8.8–10.4)
CHLORIDE SERPL-SCNC: 104 MMOL/L (ref 98–107)
CREAT SERPL-MCNC: 0.92 MG/DL (ref 0.51–0.95)
EGFRCR SERPLBLD CKD-EPI 2021: 66 ML/MIN/1.73M2
FERRITIN SERPL-MCNC: 59 NG/ML (ref 11–328)
GLUCOSE SERPL-MCNC: 107 MG/DL (ref 70–99)
HCO3 SERPL-SCNC: 23 MMOL/L (ref 22–29)
POTASSIUM SERPL-SCNC: 4.2 MMOL/L (ref 3.4–5.3)
SODIUM SERPL-SCNC: 140 MMOL/L (ref 135–145)
TSH SERPL DL<=0.005 MIU/L-ACNC: 0.75 UIU/ML (ref 0.3–4.2)
VIT D+METAB SERPL-MCNC: 56 NG/ML (ref 20–50)

## 2025-02-05 ENCOUNTER — MYC MEDICAL ADVICE (OUTPATIENT)
Dept: FAMILY MEDICINE | Facility: CLINIC | Age: 70
End: 2025-02-05
Payer: COMMERCIAL

## 2025-02-10 ENCOUNTER — PATIENT OUTREACH (OUTPATIENT)
Dept: CARE COORDINATION | Facility: CLINIC | Age: 70
End: 2025-02-10
Payer: COMMERCIAL

## 2025-02-10 ENCOUNTER — TRANSFERRED RECORDS (OUTPATIENT)
Dept: HEALTH INFORMATION MANAGEMENT | Facility: CLINIC | Age: 70
End: 2025-02-10
Payer: COMMERCIAL

## 2025-02-12 ENCOUNTER — TELEPHONE (OUTPATIENT)
Dept: NEUROSURGERY | Facility: CLINIC | Age: 70
End: 2025-02-12
Payer: COMMERCIAL

## 2025-02-12 NOTE — TELEPHONE ENCOUNTER
FYI - Status Update    Who is Calling: patient    Update: pt called stating she needs a letter to be faxed to [employer]- [phone]. Needs to say she rec pt for her     Does caller want a call/response back: Yes indicating that it was done    Could we send this information to you in [employer] or would you prefer to receive a phone call?:   Patient would prefer a phone call     Okay to leave a detailed message?: Yes at Work number on file:  There is no work phone number on file.

## 2025-02-13 ENCOUNTER — MYC MEDICAL ADVICE (OUTPATIENT)
Dept: NEUROSURGERY | Facility: CLINIC | Age: 70
End: 2025-02-13
Payer: COMMERCIAL

## 2025-02-13 NOTE — TELEPHONE ENCOUNTER
Called patient to discuss request. She last saw Kate in April 2024. A referral for PT was placed for cervical and lumbar radiculopathy.     Patient states she no longer has neck pain but her sciatic pain is worsening. Reviewed note from Kate Burroughs CNP from April 2024 with patient. Patient reports same distribution of symptoms - pain down left leg from buttocks to ankle.    She states she did not use PT order yet.    Encounter routed to CSS to assist with faxing.

## 2025-02-13 NOTE — TELEPHONE ENCOUNTER
[facility] Call Center    Phone Message    May a detailed message be left on voicemail: yes     Reason for Call: Other: Patient returning call and would like a referral sent to [facility] at : Fax # [phone].     Action Taken: Message routed to:  Other: [place]    Travel Screening: Not Applicable     Date of Service:

## 2025-02-13 NOTE — TELEPHONE ENCOUNTER
Attempted to reach out to patient, no answer. Left voice message for them to call clinic back to further discuss.  Must See [employer] message sent to patient.

## 2025-02-17 NOTE — TELEPHONE ENCOUNTER
Faxed  Physical therapy order [facility]   February 17, 2025 to fax number [phone]    Right Fax confirmed at 10:03 AM    Cleo Virgen     I personally evaluated the patient. I reviewed the Resident’s or Physician Assistant’s note (as assigned above), and agree with the findings and plan except as documented in my note.     44 male here for evaluation of chest tightness x 2 days. Advised to come to ED by PMD who is non local.  Pressure like, "sitting on my chest", intermittent, non radiating. ROS + POWELL, no edema no fever, no cough, no leg swelling. Social & family history unremarkable. PE: male in no distress. HEENT: non icteric. CHEST: normal work of breathing CTA bilateral no wheezing. CV: pulses intact. SKIN: normal.     Impression: chest pain    Plan: IV labs imaging supportive care and dispo to EDOU

## 2025-02-18 ENCOUNTER — ALLIED HEALTH/NURSE VISIT (OUTPATIENT)
Dept: FAMILY MEDICINE | Facility: CLINIC | Age: 70
End: 2025-02-18
Payer: COMMERCIAL

## 2025-02-18 VITALS — HEART RATE: 73 BPM | DIASTOLIC BLOOD PRESSURE: 75 MMHG | SYSTOLIC BLOOD PRESSURE: 145 MMHG

## 2025-02-18 DIAGNOSIS — Z01.30 BLOOD PRESSURE CHECK: Primary | ICD-10-CM

## 2025-02-18 PROCEDURE — 99207 PR NO CHARGE NURSE ONLY: CPT | Performed by: FAMILY MEDICINE

## 2025-02-18 NOTE — PROGRESS NOTES
Nannette Awad was evaluated at [facility] on February 18, 2025 at which time her blood pressure was:    BP Readings from Last 3 Encounters:   02/18/25 (!) 145/75   01/15/25 (!) 142/52   11/06/24 138/70     Pulse Readings from Last 3 Encounters:   02/18/25 73   01/15/25 82   11/06/24 76       Reviewed lifestyle modifications for blood pressure control and reduction: including making healthy food choices, managing weight, getting regular exercise, smoking cessation, reducing alcohol consumption, monitoring blood pressure regularly.     Symptoms: None    BP Goal:< 140/90 mmHg    BP Assessment:  is border high    Potential Reasons for BP too high: NA - Not applicable    BP Follow-Up Plan: 3 to 4 months    Recommendation to Provider: n/a    Note completed by: Estrella Liao, Pharm D  [facility]  [phone]

## 2025-03-05 ENCOUNTER — DOCUMENTATION ONLY (OUTPATIENT)
Dept: NEUROSURGERY | Facility: CLINIC | Age: 70
End: 2025-03-05
Payer: COMMERCIAL

## 2025-03-05 NOTE — PROGRESS NOTES
Faxed  PT Plan of Care  March 5, 2025 to Eliecer Thomas fax number [phone] & [facility] fax number [phone]    Right Fax confirmed at 9:58 AM    Francine Zheng

## 2025-03-10 ENCOUNTER — PATIENT OUTREACH (OUTPATIENT)
Dept: CARE COORDINATION | Facility: CLINIC | Age: 70
End: 2025-03-10
Payer: COMMERCIAL

## 2025-04-10 SDOH — HEALTH STABILITY: PHYSICAL HEALTH: ON AVERAGE, HOW MANY MINUTES DO YOU ENGAGE IN EXERCISE AT THIS LEVEL?: 10 MIN

## 2025-04-10 SDOH — HEALTH STABILITY: PHYSICAL HEALTH: ON AVERAGE, HOW MANY DAYS PER WEEK DO YOU ENGAGE IN MODERATE TO STRENUOUS EXERCISE (LIKE A BRISK WALK)?: 2 DAYS

## 2025-04-10 ASSESSMENT — SOCIAL DETERMINANTS OF HEALTH (SDOH): HOW OFTEN DO YOU GET TOGETHER WITH FRIENDS OR RELATIVES?: TWICE A WEEK

## 2025-04-11 NOTE — PATIENT INSTRUCTIONS
Patient Education   Preventive Care Advice   This is general advice given by our system to help you stay healthy. However, your care team may have specific advice just for you. Please talk to your care team about your preventive care needs.  Nutrition  Eat 5 or more servings of fruits and vegetables each day.  Try wheat bread, brown rice and whole grain pasta (instead of white bread, rice, and pasta).  Get enough calcium and vitamin D. Check the label on foods and aim for 100% of the RDA (recommended daily allowance).  Lifestyle  Exercise at least 150 minutes each week  (30 minutes a day, 5 days a week).  Do muscle strengthening activities 2 days a week. These help control your weight and prevent disease.  No smoking.  Wear sunscreen to prevent skin cancer.  Have a dental exam and cleaning every 6 months.  Yearly exams  See your health care team every year to talk about:  Any changes in your health.  Any medicines your care team has prescribed.  Preventive care, family planning, and ways to prevent chronic diseases.  Shots (vaccines)   HPV shots (up to age 26), if you've never had them before.  Hepatitis B shots (up to age 59), if you've never had them before.  COVID-19 shot: Get this shot when it's due.  Flu shot: Get a flu shot every year.  Tetanus shot: Get a tetanus shot every 10 years.  Pneumococcal, hepatitis A, and RSV shots: Ask your care team if you need these based on your risk.  Shingles shot (for age 50 and up)  General health tests  Diabetes screening:  Starting at age 35, Get screened for diabetes at least every 3 years.  If you are younger than age 35, ask your care team if you should be screened for diabetes.  Cholesterol test: At age 39, start having a cholesterol test every 5 years, or more often if advised.  Bone density scan (DEXA): At age 50, ask your care team if you should have this scan for osteoporosis (brittle bones).  Hepatitis C: Get tested at least once in your life.  STIs (sexually  transmitted infections)  Before age 24: Ask your care team if you should be screened for STIs.  After age 24: Get screened for STIs if you're at risk. You are at risk for STIs (including HIV) if:  You are sexually active with more than one person.  You don't use condoms every time.  You or a partner was diagnosed with a sexually transmitted infection.  If you are at risk for HIV, ask about PrEP medicine to prevent HIV.  Get tested for HIV at least once in your life, whether you are at risk for HIV or not.  Cancer screening tests  Cervical cancer screening: If you have a cervix, begin getting regular cervical cancer screening tests starting at age 21.  Breast cancer scan (mammogram): If you've ever had breasts, begin having regular mammograms starting at age 40. This is a scan to check for breast cancer.  Colon cancer screening: It is important to start screening for colon cancer at age 45.  Have a colonoscopy test every 10 years (or more often if you're at risk) Or, ask your provider about stool tests like a FIT test every year or Cologuard test every 3 years.  To learn more about your testing options, visit:   .  For help making a decision, visit:   https://bit.ly/mt80313.  Prostate cancer screening test: If you have a prostate, ask your care team if a prostate cancer screening test (PSA) at age 55 is right for you.  Lung cancer screening: If you are a current or former smoker ages 50 to 80, ask your care team if ongoing lung cancer screenings are right for you.  For informational purposes only. Not to replace the advice of your health care provider. Copyright   2023 [facility]. All rights reserved. Clinically reviewed by the [facility]. [employer] 109043 - REV 01/24.  Preventing Falls: Care Instructions  Injuries and health problems such as trouble walking or poor eyesight can increase your risk of falling. So can some medicines. But there are things you can do to help  "prevent falls. You can exercise to get stronger. You can also arrange your home to make it safer.    Talk to your doctor about the medicines you take. Ask if any of them increase the risk of falls and whether they can be changed or stopped.   Try to exercise regularly. It can help improve your strength and balance. This can help lower your risk of falling.         Practice fall safety and prevention.   Wear low-heeled shoes that fit well and give your feet good support. Talk to your doctor if you have foot problems that make this hard.  Carry a cellphone or wear a medical alert device that you can use to call for help.  Use stepladders instead of chairs to reach high objects. Don't climb if you're at risk for falls. Ask for help, if needed.  Wear the correct eyeglasses, if you need them.        Make your home safer.   Remove rugs, cords, clutter, and furniture from walkways.  Keep your house well lit. Use night-lights in hallways and bathrooms.  Install and use sturdy handrails on stairways.  Wear nonskid footwear, even inside. Don't walk barefoot or in socks without shoes.        Be safe outside.   Use handrails, curb cuts, and ramps whenever possible.  Keep your hands free by using a shoulder bag or backpack.  Try to walk in well-lit areas. Watch out for uneven ground, changes in pavement, and debris.  Be careful in the winter. Walk on the grass or gravel when sidewalks are slippery. Use de-icer on steps and walkways. Add non-slip devices to shoes.    Put grab bars and nonskid mats in your shower or tub and near the toilet. Try to use a shower chair or bath bench when bathing.   Get into a tub or shower by putting in your weaker leg first. Get out with your strong side first. Have a phone or medical alert device in the bathroom with you.   Where can you learn more?  Go to https://www.[employer].net/patiented  Enter G117 in the search box to learn more about \"Preventing Falls: Care Instructions.\"  Current as of: " July 31, 2024  Content Version: 14.4    6585-2187 [employer].   Care instructions adapted under license by your healthcare professional. If you have questions about a medical condition or this instruction, always ask your healthcare professional. [employer] disclaims any warranty or liability for your use of this information.    Hearing Loss: Care Instructions  Overview     Hearing loss is a sudden or slow decrease in how well you hear. It can range from slight to profound. Permanent hearing loss can occur with aging. It also can happen when you are exposed long-term to loud noise. Examples include listening to loud music, riding motorcycles, or being around other loud machines.  Hearing loss can affect your work and home life. It can make you feel lonely or depressed. You may feel that you have lost your independence. But hearing aids and other devices can help you hear better and feel connected to others.  Follow-up care is a key part of your treatment and safety. Be sure to make and go to all appointments, and call your doctor if you are having problems. It's also a good idea to know your test results and keep a list of the medicines you take.  How can you care for yourself at home?  Avoid loud noises whenever possible. This helps keep your hearing from getting worse.  Always wear hearing protection around loud noises.  Wear a hearing aid as directed.  A professional can help you pick a hearing aid that will work best for you.  You can also get hearing aids over the counter for mild to moderate hearing loss.  Have hearing tests as your doctor suggests. They can show whether your hearing has changed. Your hearing aid may need to be adjusted.  Use other devices as needed. These may include:  Telephone amplifiers and hearing aids that can connect to a television, stereo, radio, or microphone.  Devices that use lights or vibrations. These alert you to the doorbell, a ringing telephone, or a  "baby monitor.  Television closed-captioning. This shows the words at the bottom of the screen. Most new TVs can do this.  TTY (text telephone). This lets you type messages back and forth on the telephone instead of talking or listening. These devices are also called TDD. When messages are typed on the keyboard, they are sent over the phone line to a receiving TTY. The message is shown on a monitor.  Use text messaging, social media, and email if it is hard for you to communicate by telephone.  Try to learn a listening technique called speechreading. It is not lipreading. You pay attention to people's gestures, expressions, posture, and tone of voice. These clues can help you understand what a person is saying. Face the person you are talking to, and have them face you. Make sure the lighting is good. You need to see the other person's face clearly.  Think about counseling if you need help to adjust to your hearing loss.  When should you call for help?  Watch closely for changes in your health, and be sure to contact your doctor if:    You think your hearing is getting worse.     You have new symptoms, such as dizziness or nausea.   Where can you learn more?  Go to https://www.[employer].net/patiented  Enter R798 in the search box to learn more about \"Hearing Loss: Care Instructions.\"  Current as of: October 27, 2024  Content Version: 14.4 2024-2025 [employer].   Care instructions adapted under license by your healthcare professional. If you have questions about a medical condition or this instruction, always ask your healthcare professional. [employer] disclaims any warranty or liability for your use of this information.    Bladder Training: Care Instructions  Your Care Instructions     Bladder training is used to treat urge incontinence and stress incontinence. Urge incontinence means that the need to urinate comes on so fast that you can't get to a toilet in time. Stress incontinence " means that you leak urine because of pressure on your bladder. For example, it may happen when you laugh, cough, or lift something heavy.  Bladder training can increase how long you can wait before you have to urinate. It can also help your bladder hold more urine. And it can give you better control over the urge to urinate.  It is important to remember that bladder training takes a few weeks to a few months to make a difference. You may not see results right away, but don't give up.  Follow-up care is a key part of your treatment and safety. Be sure to make and go to all appointments, and call your doctor if you are having problems. It's also a good idea to know your test results and keep a list of the medicines you take.  How can you care for yourself at home?  Work with your doctor to come up with a bladder training program that is right for you. You may use one or more of the following methods.  Delayed urination  In the beginning, try to keep from urinating for 5 minutes after you first feel the need to go.  While you wait, take deep, slow breaths to relax. Kegel exercises can also help you delay the need to go to the bathroom.  After some practice, when you can easily wait 5 minutes to urinate, try to wait 10 minutes before you urinate.  Slowly increase the waiting period until you are able to control when you have to urinate.  Scheduled urination  Empty your bladder when you first wake up in the morning.  Schedule times throughout the day when you will urinate.  Start by going to the bathroom every hour, even if you don't need to go.  Slowly increase the time between trips to the bathroom.  When you have found a schedule that works well for you, keep doing it.  If you wake up during the night and have to urinate, do it. Apply your schedule to waking hours only.  Kegel exercises  These tighten and strengthen pelvic muscles, which can help you control the flow of urine. (If doing these exercises causes pain,  "stop doing them and talk with your doctor.) To do Kegel exercises:  Squeeze your muscles as if you were trying not to pass gas. Or squeeze your muscles as if you were stopping the flow of urine. Your belly, legs, and buttocks shouldn't move.  Hold the squeeze for 3 seconds, then relax for 5 to 10 seconds.  Start with 3 seconds, then add 1 second each week until you are able to squeeze for 10 seconds.  Repeat the exercise 10 times a session. Do 3 to 8 sessions a day.  When should you call for help?  Watch closely for changes in your health, and be sure to contact your doctor if:    Your incontinence is getting worse.     You do not get better as expected.   Where can you learn more?  Go to https://www.[employer].net/patiented  Enter V684 in the search box to learn more about \"Bladder Training: Care Instructions.\"  Current as of: April 30, 2024  Content Version: 14.4    0362-6214 [employer].   Care instructions adapted under license by your healthcare professional. If you have questions about a medical condition or this instruction, always ask your healthcare professional. [employer] disclaims any warranty or liability for your use of this information.       "

## 2025-04-15 ENCOUNTER — OFFICE VISIT (OUTPATIENT)
Dept: FAMILY MEDICINE | Facility: CLINIC | Age: 70
End: 2025-04-15
Payer: COMMERCIAL

## 2025-04-15 VITALS
OXYGEN SATURATION: 99 % | BODY MASS INDEX: 28.85 KG/M2 | HEART RATE: 76 BPM | RESPIRATION RATE: 19 BRPM | WEIGHT: 169 LBS | HEIGHT: 64 IN | SYSTOLIC BLOOD PRESSURE: 150 MMHG | TEMPERATURE: 97.9 F | DIASTOLIC BLOOD PRESSURE: 60 MMHG

## 2025-04-15 DIAGNOSIS — E78.5 HYPERLIPIDEMIA LDL GOAL <130: ICD-10-CM

## 2025-04-15 DIAGNOSIS — R55 SYNCOPE, UNSPECIFIED SYNCOPE TYPE: ICD-10-CM

## 2025-04-15 DIAGNOSIS — R20.2 PARESTHESIA: ICD-10-CM

## 2025-04-15 DIAGNOSIS — N18.31 CHRONIC KIDNEY DISEASE, STAGE 3A (H): ICD-10-CM

## 2025-04-15 DIAGNOSIS — R73.03 PREDIABETES: ICD-10-CM

## 2025-04-15 DIAGNOSIS — M85.89 OSTEOPENIA OF MULTIPLE SITES: ICD-10-CM

## 2025-04-15 DIAGNOSIS — I10 HYPERTENSION GOAL BP (BLOOD PRESSURE) < 150/90: ICD-10-CM

## 2025-04-15 DIAGNOSIS — G47.00 INSOMNIA, UNSPECIFIED TYPE: ICD-10-CM

## 2025-04-15 DIAGNOSIS — K21.9 GASTROESOPHAGEAL REFLUX DISEASE WITHOUT ESOPHAGITIS: ICD-10-CM

## 2025-04-15 DIAGNOSIS — Z00.00 ENCOUNTER FOR MEDICARE ANNUAL WELLNESS EXAM: Primary | ICD-10-CM

## 2025-04-15 DIAGNOSIS — R82.994 HYPERCALCIURIA: ICD-10-CM

## 2025-04-15 DIAGNOSIS — G43.909 MIGRAINE WITHOUT STATUS MIGRAINOSUS, NOT INTRACTABLE, UNSPECIFIED MIGRAINE TYPE: ICD-10-CM

## 2025-04-15 LAB
EST. AVERAGE GLUCOSE BLD GHB EST-MCNC: 128 MG/DL
HBA1C MFR BLD: 6.1 % (ref 0–5.6)

## 2025-04-15 PROCEDURE — 80048 BASIC METABOLIC PNL TOTAL CA: CPT | Performed by: FAMILY MEDICINE

## 2025-04-15 PROCEDURE — 83036 HEMOGLOBIN GLYCOSYLATED A1C: CPT | Performed by: FAMILY MEDICINE

## 2025-04-15 PROCEDURE — G0439 PPPS, SUBSEQ VISIT: HCPCS | Performed by: FAMILY MEDICINE

## 2025-04-15 PROCEDURE — G2211 COMPLEX E/M VISIT ADD ON: HCPCS | Performed by: FAMILY MEDICINE

## 2025-04-15 PROCEDURE — 99214 OFFICE O/P EST MOD 30 MIN: CPT | Mod: 25 | Performed by: FAMILY MEDICINE

## 2025-04-15 PROCEDURE — 3077F SYST BP >= 140 MM HG: CPT | Performed by: FAMILY MEDICINE

## 2025-04-15 PROCEDURE — 3078F DIAST BP <80 MM HG: CPT | Performed by: FAMILY MEDICINE

## 2025-04-15 PROCEDURE — 36415 COLL VENOUS BLD VENIPUNCTURE: CPT | Performed by: FAMILY MEDICINE

## 2025-04-15 PROCEDURE — 1126F AMNT PAIN NOTED NONE PRSNT: CPT | Performed by: FAMILY MEDICINE

## 2025-04-15 PROCEDURE — 82570 ASSAY OF URINE CREATININE: CPT | Performed by: FAMILY MEDICINE

## 2025-04-15 PROCEDURE — 82043 UR ALBUMIN QUANTITATIVE: CPT | Performed by: FAMILY MEDICINE

## 2025-04-15 PROCEDURE — 80061 LIPID PANEL: CPT | Performed by: FAMILY MEDICINE

## 2025-04-15 RX ORDER — ALENDRONATE SODIUM 70 MG/1
70 TABLET ORAL
Qty: 12 TABLET | Refills: 3 | Status: SHIPPED | OUTPATIENT
Start: 2025-04-15

## 2025-04-15 RX ORDER — SUMATRIPTAN 50 MG/1
TABLET, FILM COATED ORAL
Qty: 27 TABLET | Refills: 1 | Status: SHIPPED | OUTPATIENT
Start: 2025-04-15

## 2025-04-15 RX ORDER — OMEPRAZOLE 20 MG/1
CAPSULE, DELAYED RELEASE ORAL
Qty: 90 CAPSULE | Refills: 3 | Status: SHIPPED | OUTPATIENT
Start: 2025-04-15

## 2025-04-15 RX ORDER — ZOLPIDEM TARTRATE 5 MG/1
5 TABLET ORAL
Qty: 90 TABLET | Refills: 1 | Status: SHIPPED | OUTPATIENT
Start: 2025-04-15

## 2025-04-15 RX ORDER — PRAVASTATIN SODIUM 40 MG
TABLET ORAL
Qty: 90 TABLET | Refills: 3 | Status: SHIPPED | OUTPATIENT
Start: 2025-04-15

## 2025-04-15 RX ORDER — CLOBETASOL PROPIONATE 0.5 MG/G
OINTMENT TOPICAL 2 TIMES DAILY
COMMUNITY

## 2025-04-15 ASSESSMENT — PAIN SCALES - GENERAL: PAINLEVEL_OUTOF10: NO PAIN (0)

## 2025-04-15 NOTE — PROGRESS NOTES
Preventive Care Visit  [facility]  Joy Dickson MD, Family Medicine  Apr 15, 2025      Assessment & Plan     (Z00.00) Encounter for Medicare annual wellness exam  (primary encounter diagnosis)  Comment: preventive needs reviewed   Plan: see orders in Epic.     (R55) Syncope, unspecified syncope type  Comment: stable, full w/up negative  Plan: OFFICE/OUTPT VISIT,EST,LEVL IV        Continue precautions    (E78.5) Hyperlipidemia LDL goal <130  Comment: due for labs  Plan: Lipid panel reflex to direct LDL Non-fasting,         pravastatin (PRAVACHOL) 40 MG tablet,         OFFICE/OUTPT VISIT,EST,LEVL IV        Refill x 1 yr     (I10) Hypertension goal BP (blood pressure) < 150/90  Comment: not to goal, to goal at home with validated cuff  Plan: Basic metabolic panel  (Ca, Cl, CO2, Creat,         Gluc, K, Na, BUN), OFFICE/OUTPT VISIT,EST,LEVL         IV        Continue current meds, recent  Refill x 6 months   Send in home readings later this week    (N18.31) Chronic kidney disease, stage 3a (H)  Comment: stable  Plan: Albumin Random Urine Quantitative with Creat         Ratio, Basic metabolic panel  (Ca, Cl, CO2,         Creat, Gluc, K, Na, BUN), OFFICE/OUTPT         VISIT,EST,LEVL IV        Consider SGLT-2 if worsening    (R73.03) Prediabetes  Comment: due  Plan: HEMOGLOBIN A1C, OFFICE/OUTPT VISIT,EST,LEVL IV            (R82.994) Hypercalciuria  (M85.89) Osteopenia of multiple sites  Comment: recheck labs today  Plan: alendronate (FOSAMAX) 70 MG tablet,         OFFICE/OUTPT VISIT,EST,LEVL IV        Continue alendronate, has dexa in May    (K21.9) Gastroesophageal reflux disease without esophagitis  Comment: controlled  Plan: omeprazole (PRILOSEC) 20 MG DR capsule,         OFFICE/OUTPT VISIT,EST,LEVL IV        Refill x 1 yr     (G43.909) Migraine without status migrainosus, not intractable, unspecified migraine type  Comment: stable  Plan: SUMAtriptan (IMITREX) 50 MG tablet,         OFFICE/OUTPT  VISIT,EST,LEVL IV         Refill x 6 months     (G47.00) Insomnia, unspecified type  Comment: episodic use  Plan: zolpidem (AMBIEN) 5 MG tablet, OFFICE/OUTPT         VISIT,EST,LEVL IV         Refill x 6 months     (R20.2) Paresthesia  Comment: continues  Plan: OFFICE/OUTPT VISIT,EST,LEVL IV        Follow-up with neurology, trial of duloxetine recommended        The longitudinal plan of care for the diagnosis(es)/condition(s) as documented were addressed during this visit. Due to the added complexity in care, I will continue to support Pat in the subsequent management and with ongoing continuity of care.    Counseling  Appropriate preventive services were addressed with this patient via screening, questionnaire, or discussion as appropriate for fall prevention, nutrition, physical activity, Tobacco-use cessation, social engagement, weight loss and cognition.  Checklist reviewing preventive services available has been given to the patient.  Reviewed patient's diet, addressing concerns and/or questions.   She is at risk for lack of exercise and has been provided with information to increase physical activity for the benefit of her well-being.   The patient was provided with written information regarding signs of hearing loss.   Information on urinary incontinence and treatment options given to patient.       See Patient Instructions    Subjective   Pat is a 70 year old, presenting for the following:  Medicare Visit        4/15/2025    10:35 AM   Additional Questions   Roomed by Arsh MUELLER LPN   Accompanied by Self         HPI  Continues to have burning sensation, neurology trialed gabapentin without improvement and had sedation side effects.  Considering duloxetine.  Tried topiramate and had side effects     Still working with PT for piriformis and sciatica. Slow progress     Still with episodes of syncope.    Advance Care Planning  Patient does not have a Health Care Directive: Discussed advance care planning with  patient; information given to patient to review.      4/10/2025   General Health   How would you rate your overall physical health? Good   Feel stress (tense, anxious, or unable to sleep) Not at all         4/10/2025   Nutrition   Diet: Regular (no restrictions)         4/10/2025   Exercise   Days per week of moderate/strenous exercise 2 days   Average minutes spent exercising at this level 10 min   (!) EXERCISE CONCERN      4/10/2025   Social Factors   Frequency of gathering with friends or relatives Twice a week   Worry food won't last until get money to buy more No   Food not last or not have enough money for food? No   Do you have housing? (Housing is defined as stable permanent housing and does not include staying ouside in a car, in a tent, in an abandoned building, in an overnight shelter, or couch-surfing.) Yes   Are you worried about losing your housing? No   Lack of transportation? No   Unable to get utilities (heat,electricity)? No         4/15/2025   Fall Risk   Gait Speed Test (Document in seconds) 3.58   Gait Speed Test Interpretation Less than or equal to 5.00 seconds - PASS          4/10/2025   Activities of Daily Living- Home Safety   Needs help with the following daily activites None of the above   Safety concerns in the home None of the above         4/10/2025   Dental   Dentist two times every year? Yes         4/10/2025   Hearing Screening   Hearing concerns? (!) I NEED TO ASK PEOPLE TO SPEAK UP OR REPEAT THEMSELVES.         4/10/2025   Driving Risk Screening   Patient/family members have concerns about driving No         4/10/2025   General Alertness/Fatigue Screening   Have you been more tired than usual lately? No         4/10/2025   Urinary Incontinence Screening   Bothered by leaking urine in past 6 months Yes           3/27/2024   TB Screening   Were you born outside of the [place]? No           Today's PHQ-2 Score:       4/15/2025    10:26 AM   PHQ-2 ( 1999 [employer])   Q1: Little interest or  pleasure in doing things 0   Q2: Feeling down, depressed or hopeless 0   PHQ-2 Score 0    Q1: Little interest or pleasure in doing things Not at all   Q2: Feeling down, depressed or hopeless Not at all   PHQ-2 Score 0       Patient-reported           4/10/2025   Substance Use   Alcohol more than 3/day or more than 7/wk No   Do you have a current opioid prescription? No   How severe/bad is pain from 1 to 10? 7/10   Do you use any other substances recreationally? No     Social History     Tobacco Use    Smoking status: Never     Passive exposure: Never    Smokeless tobacco: Never   Vaping Use    Vaping status: Never Used   Substance Use Topics    Alcohol use: No    Drug use: No           3/20/2022   LAST FHS-7 RESULTS   1st degree relative breast or ovarian cancer Yes   Any relative bilateral breast cancer No   Any male have breast cancer No   Any ONE woman have BOTH breast AND ovarian cancer No   Any woman with breast cancer before 50yrs Yes   2 or more relatives with breast AND/OR ovarian cancer No   2 or more relatives with breast AND/OR bowel cancer No        Mammogram Screening - Mammogram every 1-2 years updated in Health Maintenance based on mutual decision making      G 3 P 2   No LMP recorded. Patient has had a hysterectomy.     Fasting: No   Td: tdap 3/2020       Flu: 9/2024      Covid: 9/2024      Shingrix: done      PPV: done       RSV: 2/2025               Cholesterol:   Lab Results   Component Value Date    CHOL 224 03/30/2024    CHOL 248 03/04/2021     Lab Results   Component Value Date    HDL 92 03/30/2024    HDL 83 03/04/2021     Lab Results   Component Value Date     03/30/2024     03/04/2021     Lab Results   Component Value Date    TRIG 123 03/30/2024    TRIG 220 03/04/2021     Lab Results   Component Value Date    CHOLHDLRATIO 2.7 02/02/2015         MMG: 3/2025  Dexa:  5/2025     Flex/colo: 6/2024 q3y scope      Seat Belt: Yes    Sunscreen use: Yes   Calcium Intake: adeq  Health Care  Directive: No  Sexually Active: No    Current contraception: hysterectomy  History of abnormal Pap smear: Yes: see pmh  Family history of colon/breast/ovarian cancer: Yes: see [facility]  Regular self breast exam: Yes  History of abnormal mammogram: Yes: see reports      ASCVD Risk   The 10-year ASCVD risk score (Mandy GARCIA, et al., 2019) is: 20.1%    Values used to calculate the score:      Age: 70 years      Sex: Female      Is Non- : No      Diabetic: No      Tobacco smoker: No      Systolic Blood Pressure: 150 mmHg      Is BP treated: Yes      HDL Cholesterol: 92 mg/dL      Total Cholesterol: 224 mg/dL            Reviewed and updated as needed this visit by Provider   Tobacco  Allergies  Meds  Problems  Med Hx  Surg Hx  Fam Hx            Labs reviewed in EPIC  BP Readings from Last 3 Encounters:   04/15/25 (!) 150/60   02/18/25 (!) 145/75   01/15/25 (!) 142/52    Wt Readings from Last 3 Encounters:   04/15/25 76.7 kg (169 lb)   01/15/25 73 kg (161 lb)   04/03/24 80.1 kg (176 lb 9.6 oz)                  Patient Active Problem List   Diagnosis    Hyperlipidemia LDL goal <130    Hypertension goal BP (blood pressure) < 150/90    Osteopenia    GERD (gastroesophageal reflux disease)    Family history of breast cancer    Migraine without status migrainosus, not intractable, unspecified migraine type    Alopecia    Vitamin D deficiency    Prediabetes    Chronic kidney disease, stage 3a (H)     Past Surgical History:   Procedure Laterality Date    BREAST BIOPSY, CORE RT/LT  1990    COLONOSCOPY      COLONOSCOPY N/A 6/12/2024    Procedure: COLONOSCOPY, FLEXIBLE, WITH LESION REMOVAL USING SNARE;  Surgeon: Tolu Baez MD;  Location:  GI    EP STUDY TILT TABLE N/A 1/19/2024    Procedure: Tilt Table Study;  Surgeon: Giovani Rosado MD;  Location:  HEART CARDIAC CATH LAB    ESOPHAGOSCOPY, GASTROSCOPY, DUODENOSCOPY (EGD), COMBINED  9/17/2012    Procedure: COMBINED ESOPHAGOSCOPY,  GASTROSCOPY, DUODENOSCOPY (EGD), BIOPSY SINGLE OR MULTIPLE;  EGD-GERD;  Surgeon: Teo Alvarado MD;  Location: [facility] OR    HYSTERECTOMY, PAP NO LONGER INDICATED  09/22/2006    vaginal hyst with bilateral sal-ooph, HSIL    LIGATN/STRIP LONG OR SHORT SAPHEN  11/09    right leg    SURGICAL HISTORY OF -   2006    conization for abnl PAP       Social History     Tobacco Use    Smoking status: Never     Passive exposure: Never    Smokeless tobacco: Never   Substance Use Topics    Alcohol use: No     Family History   Problem Relation Age of Onset    Heart Disease Mother         CHF    Hypertension Mother     Lipids Mother     Neurologic Disorder Mother 30        migraines    Alzheimer Disease Mother 67    Cancer Maternal Grandmother     Hypertension Brother     Cerebrovascular Disease Brother     Hypertension Brother     Thyroid Disease Paternal Grandmother     Breast Cancer Paternal Grandmother     Cancer Father 76        Pancreatic ca age 76    Other Cancer Father         pancreatic    Prostate Cancer Father     Breast Cancer Maternal Aunt          Current Outpatient Medications   Medication Sig Dispense Refill    alendronate (FOSAMAX) 70 MG tablet Take 1 tablet (70 mg) by mouth every 7 days. Take 60 minutes before am meal with 8 oz. water. Remain upright for 30 minutes. 12 tablet 3    amLODIPine (NORVASC) 10 MG tablet Take 1 tablet (10 mg) by mouth every morning. 90 tablet 1    Calcium Carbonate-Vitamin D (CALCIUM 500 + D PO) Take 500 mg by mouth 2 times daily 2 tablets bid      clobetasol (TEMOVATE) 0.05 % external ointment Apply topically 2 times daily.      latanoprost (XALATAN) 0.005 % ophthalmic solution INSTILL 1 DROP INTO EACH EYE ONCE DAILY      lisinopril (ZESTRIL) 10 MG tablet Take one tablet daily. 90 tablet 1    omeprazole (PRILOSEC) 20 MG DR capsule TAKE 1 CAPSULE EVERY DAY 90 capsule 3    pravastatin (PRAVACHOL) 40 MG tablet TAKE 1 TABLET EVERY DAY 90 tablet 3    SUMAtriptan (IMITREX) 50 MG tablet  TAKE 1 TABLET  AT ONSET OF HEADACHE FOR MIGRAINE MAY REPEAT IN 2 HOURS. MAX 4 TABLETS/24 HOURS. 27 tablet 1    triamcinolone (KENALOG) 0.1 % external cream Apply topically 2 times daily To rash on chest and affected area on vulva 45 g 2    VITAMIN D 1000 UNIT OR TABS 1 TABLET DAILY      zolpidem (AMBIEN) 5 MG tablet Take 1 tablet (5 mg) by mouth nightly as needed for sleep. 90 tablet 1     Current providers sharing in care for this patient include:  Patient Care Team:  Joy Dickson MD as PCP - General (Family Practice)  Joy Dickson MD as Assigned PCP  Marco Montaño MD as Assigned Endocrinology Provider  JADE Sen MD as MD (Cardiovascular Disease)  JADE Sen MD as Assigned Heart and Vascular Provider  Kate Burroughs NP as Assigned Neuroscience Provider  Sabino Escobar MD as MD (Neurology)  Giovani Prado MD as MD (Dermatology)    The following health maintenance items are reviewed in Epic and correct as of today:  Health Maintenance   Topic Date Due    ANNUAL REVIEW OF HM ORDERS  Never done    MICROALBUMIN  07/27/2012    COVID-19 Vaccine (7 - 2024-25 season) 03/16/2025    LIPID  03/30/2025    BMP  07/15/2025    HEMOGLOBIN  01/15/2026    MAMMO SCREENING  03/13/2026    MEDICARE ANNUAL WELLNESS VISIT  04/15/2026    A1C  04/15/2026    FALL RISK ASSESSMENT  04/15/2026    COLORECTAL CANCER SCREENING  06/12/2027    DEXA  09/20/2027    DIABETES SCREENING  04/15/2028    ADVANCE CARE PLANNING  04/03/2029    DTAP/TDAP/TD IMMUNIZATION (3 - Td or Tdap) 03/02/2030    HEPATITIS C SCREENING  Completed    MIGRAINE ACTION PLAN  Completed    PHQ-2 (once per calendar year)  Completed    INFLUENZA VACCINE  Completed    Pneumococcal Vaccine: 50+ Years  Completed    URINALYSIS  Completed    ZOSTER IMMUNIZATION  Completed    RSV VACCINE  Completed    HPV IMMUNIZATION  Aged Out    MENINGITIS IMMUNIZATION  Aged Out         Review of Systems  Constitutional, neuro, ENT, endocrine, pulmonary,  "cardiac, gastrointestinal, genitourinary, musculoskeletal, integument and psychiatric systems are negative, except as otherwise noted.     Objective    Exam  BP (!) 150/60   Pulse 76   Temp 97.9  F (36.6  C) (Tympanic)   Resp 19   Ht 1.626 m (5' 4\")   Wt 76.7 kg (169 lb)   SpO2 99%   BMI 29.01 kg/m     Estimated body mass index is 29.01 kg/m  as calculated from the following:    Height as of this encounter: 1.626 m (5' 4\").    Weight as of this encounter: 76.7 kg (169 lb).    Physical Exam  GENERAL: alert and no distress  EYES: Eyes grossly normal to inspection, PERRL and conjunctivae and sclerae normal  HENT: ear canals and TM's normal, nose and mouth without ulcers or lesions  NECK: no adenopathy, no asymmetry, masses, or scars  RESP: lungs clear to auscultation - no rales, rhonchi or wheezes  CV: regular rate and rhythm, normal S1 S2, no S3 or S4, no murmur, click or rub, no peripheral edema  ABDOMEN: soft, nontender, no hepatosplenomegaly, no masses and bowel sounds normal  MS: no gross musculoskeletal defects noted, no edema  SKIN: no suspicious lesions or rashes  NEURO: Normal strength and tone, mentation intact and speech normal  PSYCH: mentation appears normal, affect normal/bright        4/15/2025   Mini Cog   Clock Draw Score 2 Normal   3 Item Recall 3 objects recalled   Mini Cog Total Score 5              Signed Electronically by: Joy Dickson MD    "

## 2025-04-16 ENCOUNTER — MYC MEDICAL ADVICE (OUTPATIENT)
Dept: FAMILY MEDICINE | Facility: CLINIC | Age: 70
End: 2025-04-16
Payer: COMMERCIAL

## 2025-04-16 LAB
ANION GAP SERPL CALCULATED.3IONS-SCNC: 12 MMOL/L (ref 7–15)
BUN SERPL-MCNC: 23.4 MG/DL (ref 8–23)
CALCIUM SERPL-MCNC: 10.5 MG/DL (ref 8.8–10.4)
CHLORIDE SERPL-SCNC: 103 MMOL/L (ref 98–107)
CHOLEST SERPL-MCNC: 219 MG/DL
CREAT SERPL-MCNC: 1.04 MG/DL (ref 0.51–0.95)
CREAT UR-MCNC: 186 MG/DL
EGFRCR SERPLBLD CKD-EPI 2021: 57 ML/MIN/1.73M2
FASTING STATUS PATIENT QL REPORTED: NO
FASTING STATUS PATIENT QL REPORTED: NO
GLUCOSE SERPL-MCNC: 99 MG/DL (ref 70–99)
HCO3 SERPL-SCNC: 23 MMOL/L (ref 22–29)
HDLC SERPL-MCNC: 94 MG/DL
LDLC SERPL CALC-MCNC: 106 MG/DL
MICROALBUMIN UR-MCNC: <12 MG/L
MICROALBUMIN/CREAT UR: NORMAL MG/G{CREAT}
NONHDLC SERPL-MCNC: 125 MG/DL
POTASSIUM SERPL-SCNC: 4.5 MMOL/L (ref 3.4–5.3)
SODIUM SERPL-SCNC: 138 MMOL/L (ref 135–145)
TRIGL SERPL-MCNC: 96 MG/DL

## 2025-04-16 NOTE — LETTER
April 22, 2025      Nannette Awad  [address] [facility] [address]        To Whom It May Concern,     Nannette Awad,1955 was seen and examined on 4/15/2025.  She now requires a preop for cataract surgery            4/21/2025     3:38 PM   Pre-op Questionnaire   Have you ever had a heart attack or stroke? No   Have you ever had surgery on your heart or blood vessels, such as a stent placement, a coronary artery bypass, or surgery on an artery in your head, neck, heart, or legs? No   Do you have chest pain with activity? No   Do you have a history of heart failure? No   Do you currently have a cold, bronchitis or symptoms of other infection? No   Do you have a cough, shortness of breath, or wheezing? No   Do you or anyone in your family have previous history of blood clots? No   Do you or does anyone in your family have a serious bleeding problem such as prolonged bleeding following surgeries or cuts? No   Have you ever had problems with anemia or been told to take iron pills? No   Have you had any abnormal blood loss such as black, tarry or bloody stools, or abnormal vaginal bleeding? No   Have you ever had a blood transfusion? No   Are you willing to have a blood transfusion if it is medically needed before, during, or after your surgery? Yes   Have you or any of your relatives ever had problems with anesthesia? No   Do you have sleep apnea, excessive snoring or daytime drowsiness? No   Do you have any artifical heart valves or other implanted medical devices like a pacemaker, defibrillator, or continuous glucose monitor? No   Do you have artificial joints? No   Are you allergic to latex? No         Corrina has had no change in her health since her 4/15/2025 exam and is medically optimized for the procedure.    She may continue all medications as prescribed.    See attached exam note.    Sincerely,        Joy Dickson MD

## 2025-04-16 NOTE — LETTER
April 22, 2025      Nannette Camps  [address] [facility] [address]        To Whom It May Concern,     Nannette MUELLER Ritesh, 1955 was           Sincerely,        Joy Dickson MD    Electronically signed

## 2025-04-17 NOTE — TELEPHONE ENCOUNTER
Please have pt send us the forms so I can review.  This is cataracts (extremely low risk) so I may be able to complete the form without a formal preop.

## 2025-04-18 NOTE — TELEPHONE ENCOUNTER
Forms/Letter Request    Type of form/letter: OTHER: History & physical form       Do we have the form/letter: Yes:     Who is the form from? Patient    Where did/will the form come from? Patient or family brought in       When is form/letter needed by: 5/1/25    How would you like the form/letter returned: Fax : [phone]  [facility]   NADYA has been filled out and attached to form    Patient Notified form requests are processed in 5-7 business days:Yes    Could we send this information to you in [employer] or would you prefer to receive a phone call?:   No preference   Okay to leave a detailed message?: Yes at Home number on file [phone] (home)   Patient would like a call or [employer] message once completed and faxed

## 2025-04-21 NOTE — TELEPHONE ENCOUNTER
4/21/2025     3:38 PM   Pre-op Questionnaire   Have you ever had a heart attack or stroke? No   Have you ever had surgery on your heart or blood vessels, such as a stent placement, a coronary artery bypass, or surgery on an artery in your head, neck, heart, or legs? No   Do you have chest pain with activity? No   Do you have a history of heart failure? No   Do you currently have a cold, bronchitis or symptoms of other infection? No   Do you have a cough, shortness of breath, or wheezing? No   Do you or anyone in your family have previous history of blood clots? No   Do you or does anyone in your family have a serious bleeding problem such as prolonged bleeding following surgeries or cuts? No   Have you ever had problems with anemia or been told to take iron pills? No   Have you had any abnormal blood loss such as black, tarry or bloody stools, or abnormal vaginal bleeding? No   Have you ever had a blood transfusion? No   Are you willing to have a blood transfusion if it is medically needed before, during, or after your surgery? Yes   Have you or any of your relatives ever had problems with anesthesia? No   Do you have sleep apnea, excessive snoring or daytime drowsiness? No   Do you have any artifical heart valves or other implanted medical devices like a pacemaker, defibrillator, or continuous glucose monitor? No   Do you have artificial joints? No   Are you allergic to latex? No

## 2025-04-22 ENCOUNTER — TELEPHONE (OUTPATIENT)
Dept: PHARMACY | Facility: OTHER | Age: 70
End: 2025-04-22
Payer: COMMERCIAL

## 2025-04-22 RX ORDER — BETAMETHASONE DIPROPIONATE 0.5 MG/G
OINTMENT, AUGMENTED TOPICAL
COMMUNITY
Start: 2025-04-15

## 2025-04-22 RX ORDER — TIMOLOL MALEATE 5 MG/ML
SOLUTION/ DROPS OPHTHALMIC
COMMUNITY
Start: 2025-04-16

## 2025-04-22 NOTE — TELEPHONE ENCOUNTER
[facility] Recruitment: [facility] insurance     Referral outreach attempt #1 on April 22, 2025      Outcome: left voicemail- Call back number [phone] and [employer] message sent    Tiffany Givens  [facility]

## 2025-04-22 NOTE — TELEPHONE ENCOUNTER
Faxed to [phone]. Mailed to patients home address. I have made a copy and held on to it.          Bo Hughes

## 2025-05-01 ENCOUNTER — VIRTUAL VISIT (OUTPATIENT)
Dept: PHARMACY | Facility: CLINIC | Age: 70
End: 2025-05-01
Payer: COMMERCIAL

## 2025-05-01 ENCOUNTER — TELEPHONE (OUTPATIENT)
Dept: FAMILY MEDICINE | Facility: CLINIC | Age: 70
End: 2025-05-01
Payer: COMMERCIAL

## 2025-05-01 ENCOUNTER — TELEPHONE (OUTPATIENT)
Dept: ENDOCRINOLOGY | Facility: CLINIC | Age: 70
End: 2025-05-01
Payer: COMMERCIAL

## 2025-05-01 DIAGNOSIS — G43.909 MIGRAINE WITHOUT STATUS MIGRAINOSUS, NOT INTRACTABLE, UNSPECIFIED MIGRAINE TYPE: ICD-10-CM

## 2025-05-01 DIAGNOSIS — L90.0 LICHEN SCLEROSUS: ICD-10-CM

## 2025-05-01 DIAGNOSIS — M85.80 OSTEOPENIA, UNSPECIFIED LOCATION: ICD-10-CM

## 2025-05-01 DIAGNOSIS — E78.5 HYPERLIPIDEMIA LDL GOAL <130: ICD-10-CM

## 2025-05-01 DIAGNOSIS — I10 HYPERTENSION GOAL BP (BLOOD PRESSURE) < 150/90: Primary | ICD-10-CM

## 2025-05-01 DIAGNOSIS — G47.00 INSOMNIA, UNSPECIFIED TYPE: ICD-10-CM

## 2025-05-01 DIAGNOSIS — K21.9 GASTROESOPHAGEAL REFLUX DISEASE WITHOUT ESOPHAGITIS: ICD-10-CM

## 2025-05-01 DIAGNOSIS — H40.003 GLAUCOMA SUSPECT, BILATERAL: ICD-10-CM

## 2025-05-01 DIAGNOSIS — R20.2 PARESTHESIA: ICD-10-CM

## 2025-05-01 DIAGNOSIS — S15.109D: ICD-10-CM

## 2025-05-01 PROCEDURE — 99605 MTMS BY PHARM NP 15 MIN: CPT | Mod: 93

## 2025-05-01 RX ORDER — CHOLECALCIFEROL (VITAMIN D3) 50 MCG
1 TABLET ORAL
COMMUNITY

## 2025-05-01 RX ORDER — SENNOSIDES 8.6 MG
325 CAPSULE ORAL DAILY
COMMUNITY

## 2025-05-01 NOTE — LETTER
May 1, 2025  Nannette MUELLER Ritesh  [address]  [place] [facility] [address]    Dear Ms. Awad, [facility]     Thank you for talking with me on May 1, 2025 about your health and medications. As a follow-up to our conversation, I have included two documents:      Your Recommended To-Do List has steps you should take to get the best results from your medications.  Your Medication List will help you keep track of your medications and how to take them.    If you want to talk about these documents, please call Bryanna Winter RPH at phone: [phone], Monday-Friday 8-4:30pm.    I look forward to working with you and your doctors to make sure your medications work well for you.    Sincerely,  Bryanna Winter RPH  [facility] Pharmacist, [facility]

## 2025-05-01 NOTE — TELEPHONE ENCOUNTER
Patient calling, has DEXA scan at 7:15 am tomorrow and she forgot she was supposed to not take calcium 24 hours prior and she took calcium at 9 am today.    Does she need to reschedule?  I put patient on hold and I called imaging scheduling, they are reaching out to the DEXA team to discuss and put me on hold.    She was unable to find anyone to answer the question so will reschedule patient.    Transferred patient to imaging scheduler.    Mojgan ZAMUDIO RN  [facility] Triage

## 2025-05-01 NOTE — LETTER
_  Medication List        Prepared on: May 1, 2025     Bring your Medication List when you go to the doctor, hospital, or   emergency room. And, share it with your family or caregivers.     Note any changes to how you take your medications.  Cross out medications when you no longer use them.    Medication How I take it Why I use it Prescriber   alendronate (FOSAMAX) 70 MG tablet Take 1 tablet (70 mg) by mouth every 7 days. Take 60 minutes before am meal with 8 oz. water. Remain upright for 30 minutes. Hypercalciuria; Osteopenia of multiple sites Joy Dickson MD   amLODIPine (NORVASC) 10 MG tablet Take 1 tablet (10 mg) by mouth every morning. Hypertension Goal BP (Blood Pressure) < 140/90 Joy Dickson MD   aspirin (ASA) 325 MG EC tablet Take 325 mg by mouth daily.  Vertebral Artery Injury Patient Reported   Calcium Citrate-Vitamin D (CALCIUM CITRATE + D PO) Take 2 tablets by mouth 2 times daily. 250 mg calcium citrate/400 international unit(s) vitamin D per tablet  Osteopenia Patient Reported   clobetasol (TEMOVATE) 0.05 % external ointment Apply topically 2 times daily as needed.  Lichen sclerosus  Patient Reported   latanoprost (XALATAN) 0.005 % ophthalmic solution Place 1 drop into both eyes at bedtime.  Glaucoma Patient Reported   lisinopril (ZESTRIL) 10 MG tablet Take one tablet daily. Hypertension Goal BP (Blood Pressure) < 140/90 Joy Dickson MD   omeprazole (PRILOSEC) 20 MG DR capsule TAKE 1 CAPSULE EVERY DAY Gastroesophageal Reflux Disease without Esophagitis Joy Dickson MD   pravastatin (PRAVACHOL) 40 MG tablet TAKE 1 TABLET EVERY DAY Hyperlipidemia LDL Goal <130 Joy Dickson MD   SUMAtriptan (IMITREX) 50 MG tablet TAKE 1 TABLET  AT ONSET OF HEADACHE FOR MIGRAINE MAY REPEAT IN 2 HOURS. MAX 4 TABLETS/24 HOURS. Migraine without status migrainosus, not intractable, unspecified migraine type Joy Dickson MD   timolol maleate (TIMOPTIC) 0.5 % ophthalmic solution  Place 1 drop Into the left eye at bedtime.  Glaucoma Patient Reported   triamcinolone (KENALOG) 0.1 % external cream Apply topically 2 times daily To rash on chest and affected area on vulva Lichen Sclerosus Joy Dickson MD   vitamin D3 (CHOLECALCIFEROL) 50 mcg (2000 units) tablet Take 1 tablet by mouth twice a week.  Osteopenia Patient Reported   zolpidem (AMBIEN) 5 MG tablet Take 1 tablet (5 mg) by mouth nightly as needed for sleep. Insomnia, unspecified type Joy Dickson MD         Add new medications, over-the-counter drugs, herbals, vitamins, or  minerals in the blank rows below.    Medication How I take it Why I use it Prescriber                                      Allergies:      - Lipitor [atorvastatin Calcium]  - Sulfa Antibiotics - Rash        Side effects I have had:      Not on File        Other Information:              My notes and questions:

## 2025-05-01 NOTE — PATIENT INSTRUCTIONS
"Recommendations from today's MTM visit:                                                    MTM (medication therapy management) is a service provided by a clinical pharmacist designed to help you get the most of out of your medicines.   Today we reviewed what your medicines are for, how to know if they are working, that your medicines are safe and how to make your medicine regimen as easy as possible.      Continue your current medications    Follow-up: 1 year, sooner if needed    It was great speaking with you today.  I value your experience and would be very thankful for your time in providing feedback in our clinic survey. In the next few days, you may receive an email or text message from [facility] with a link to a survey related to your  clinical pharmacist.\"     To schedule another MTM appointment, please call the clinic directly or you may call the MTM scheduling line at [phone] or toll-free at [phone].     My Clinical Pharmacist's contact information:                                                      Please feel free to contact me with any questions or concerns you have.      Bryanna Winter, PharmD  Medication Therapy Management Resident, [facility]  Phone: [phone]    Kirsten Cowan, Pharm.D., M.B.A., BCACP  Medication Therapy Management Pharmacist, [facility]  Phone: [phone]   "

## 2025-05-01 NOTE — PROGRESS NOTES
Medication Therapy Management (MTM) Encounter    ASSESSMENT:                            Medication Adherence/Access: No issues identified.    Hypertension   Home blood pressures meeting goal <140/90 mmHg and close to more stringent goal of <130/80 mmHg. Clinic blood pressures seem to be falsely elevated likely due to some degree of white coat syndrome.     Hyperlipidemia   LDL near goal <100 mg/dL, would not recommend changes at this time.     GERD    Stable.     Headache   Migraine:  Stable.     Insomnia   Stable, would encourage patient to continue to use zolpidem sparingly.     Bone Health   Osteopenia  Patient is likely meeting RDI of calcium 1200mg/day between diet and supplementation. Patient is exceeding RDI of Vitamin D 1000 IU/day and supplementation was recently decreased by PCP.  After upcoming DEXA scan, could consider if patient would be eligible for another drug holiday after at least 5 years of taking bisphosphonate.     Vertebral Artery Injury  Patient is indicated for aspirin based on vertebral injury and full dose is likely reasonable since patient was already taking a low dose aspirin during the time of the injury. Would defer to PCP for any further recommendations.    Derm   Stable.    Glaucoma  Stable.    Burning in Body  Duloxetine may increase risk of orthostatic hypotension when taken with antihypertensive medications, so would recommend patient monitor closely for this if starting duloxetine. Plan in place to follow up with [facility].    PLAN:                            Continue your current medications    Follow-up: 1 year, sooner if needed    SUBJECTIVE/OBJECTIVE:                          Corrina Awad is a 70 year old female called for an initial visit. She was referred to me from her healthcare plan.      Reason for visit: Comprehensive medication review.    Allergies/ADRs: Reviewed in chart  Past Medical History: Reviewed in chart  Tobacco: She reports that she has never smoked. She has  never been exposed to tobacco smoke. She has never used smokeless tobacco.  Alcohol: none  Caffeine: 1/2 can of Mountain Dew every day  Medication Adherence/Access: Patient uses pill box(es).  Patient takes medications 2 time(s) per day.   Per patient, misses medication 0 time(s) per week.     Hypertension   Amlodipine 10 mg daily every morning  Lisinopril 10 mg daily every evening  Patient reports the following medication side effects: does get lightheaded/dizzy occasionally with standing, resolves quickly on it's own typically.  Patient self-monitors blood pressure.  Home BP monitoring at least once per week 134/65 mmHg recently.     Hyperlipidemia   Pravastatin 40 mg daily every evening  Patient reports no current medication side effects.     GERD    Omeprazole 20 mg once daily   Patient reports no current symptoms.   Patient feels that current regimen is effective. Has tried reducing dose and symptoms came back.     Headache   Migraine:  Acute medications  Sumatriptan 50 mg as needed - using about once every 2 months  Reports this works well when she uses it. She tends to fall asleep after taking sumatriptan, but reports this also could be due to the migraine.   Does experience an aura, so will take the sumatriptan when her aura starts.     Insomnia   Zolpidem 5 mg at bedtime as needed - uses maybe twice per month  Patient reports no side effects, works well for her.     Bone Health   Osteopenia  calcium 250 mg/Vitamin D 10 mc tablets twice daily  Vitamin D 2000 twice weekly  alendronate (Fosamax) 70mg weekly (has been on current therapy since 2023)  Patient is not experiencing side effects.    DEXA History: 2022  FINDINGS:  Lumbar Spine (L1-L2) T-score: -0.6, marked degenerative changes present, most marked at L3,4, so only L1,2 are evaluated.   Left Femoral Neck T-score: -2.0  Right Femoral Neck T-score: -2.0  Lumbar (L1-L2) BMD: 1.095 Previous: 1.149   Total Hip Mean BMD: 0.834 Previous: 0.848    This patient's risks based on available information, with the use of FRAX, are 18 % for major osteoporotic fracture and 3.3 % for hip fracture.     Patient is getting  cottage cheese, yogurt, cheese in her diet .  Risk factors: post-menopausal  chronic PPI use    Treated with alendronate for ~10 years from [id number] then stopped.  Next DEXA scheduled on 5/2     Vertebral Artery Injury  Aspirin 325 mg daily  Patient experienced a vertebral artery injury after her vertebral fracture in 2022 while already on aspirin 81 mg daily. Recommended by team at [facility] to continue high dose aspirin indefinitely.  Patient reports no concerns.    Derm   Clobetasol 0.05% cream twice daily  Triamcinolone 0.1% cream twice daily  Patient reports these are effective, no concerns reported.    Glaucoma  Latanoprost 1 drop in each at bedtime  Timolol 1 drop in left eye at bedtime  Patient reports no concerns    Burning in Body  Considering adding duloxetine and wondering if this would be okay with her other medications  Works with the [facility] for this    Today's Vitals: There were no vitals taken for this visit.  ----------------      I spent 30 minutes with this patient today. I offer these suggestions for consideration by Joy Dickson MD. A copy of the visit note was provided to the patient's provider(s).    A summary of these recommendations was sent via [employer].    Bryanna Winter, GayeD  Medication Therapy Management Pharmacy Resident  [facility]    Telemedicine Visit Details  The patient's medications can be safely assessed via a telemedicine encounter.  Type of service:  Telephone visit  Originating Location (pt. Location): Home  {PROVIDER LOCATION On-site should be selected for visits conducted from your clinic location or adjoining [facility] hospital, academic office, or other nearby [facility] building. Off-site should be selected for all other provider locations, including home:[id number]}  Distant Location  (provider location):  On-site  Start Time: 10:01 AM  End Time:  10:31 AM     Medication Therapy Recommendations  No medication therapy recommendations to display

## 2025-05-01 NOTE — TELEPHONE ENCOUNTER
[facility] Call Center    Phone Message    May a detailed message be left on voicemail: yes     Reason for Call: Other: Pt is unable to make her dexa scan scheduled for 5/2 due to taking calcium. The order in the system expires on 5/13 and pt is unable to get an appt before then. Pt is requesting the order be extended or a new order to be placed so that she is able to schedule another dexa scan. Please advise.     Action Taken: Other: Endo    Travel Screening: Not Applicable     Date of Service:

## 2025-05-12 ENCOUNTER — ALLIED HEALTH/NURSE VISIT (OUTPATIENT)
Dept: FAMILY MEDICINE | Facility: CLINIC | Age: 70
End: 2025-05-12
Payer: COMMERCIAL

## 2025-05-12 ENCOUNTER — LAB (OUTPATIENT)
Dept: LAB | Facility: CLINIC | Age: 70
End: 2025-05-12
Payer: COMMERCIAL

## 2025-05-12 VITALS — DIASTOLIC BLOOD PRESSURE: 75 MMHG | SYSTOLIC BLOOD PRESSURE: 124 MMHG

## 2025-05-12 DIAGNOSIS — E83.52 HYPERCALCEMIA: ICD-10-CM

## 2025-05-12 DIAGNOSIS — R94.6 ABNORMAL FINDING ON THYROID FUNCTION TEST: ICD-10-CM

## 2025-05-12 DIAGNOSIS — Z01.30 BLOOD PRESSURE CHECK: Primary | ICD-10-CM

## 2025-05-12 DIAGNOSIS — M85.9 LOW BONE DENSITY: ICD-10-CM

## 2025-05-12 LAB
ALBUMIN SERPL BCG-MCNC: 4.5 G/DL (ref 3.5–5.2)
ALP SERPL-CCNC: 89 U/L (ref 40–150)
ALT SERPL W P-5'-P-CCNC: 23 U/L (ref 0–50)
ANION GAP SERPL CALCULATED.3IONS-SCNC: 12 MMOL/L (ref 7–15)
AST SERPL W P-5'-P-CCNC: 24 U/L (ref 0–45)
BILIRUB SERPL-MCNC: 0.7 MG/DL
BUN SERPL-MCNC: 31.5 MG/DL (ref 8–23)
CA-I BLD-MCNC: 4.9 MG/DL (ref 4.4–5.2)
CALCIUM SERPL-MCNC: 9.8 MG/DL (ref 8.8–10.4)
CHLORIDE SERPL-SCNC: 102 MMOL/L (ref 98–107)
CREAT SERPL-MCNC: 1.06 MG/DL (ref 0.51–0.95)
EGFRCR SERPLBLD CKD-EPI 2021: 56 ML/MIN/1.73M2
GLUCOSE SERPL-MCNC: 133 MG/DL (ref 70–99)
HCO3 SERPL-SCNC: 24 MMOL/L (ref 22–29)
PHOSPHATE SERPL-MCNC: 3.1 MG/DL (ref 2.5–4.5)
POTASSIUM SERPL-SCNC: 4.4 MMOL/L (ref 3.4–5.3)
PROT SERPL-MCNC: 7.3 G/DL (ref 6.4–8.3)
SODIUM SERPL-SCNC: 138 MMOL/L (ref 135–145)
TSH SERPL DL<=0.005 MIU/L-ACNC: 1.91 UIU/ML (ref 0.3–4.2)
VIT D+METAB SERPL-MCNC: 46 NG/ML (ref 20–50)

## 2025-05-12 PROCEDURE — 82330 ASSAY OF CALCIUM: CPT

## 2025-05-12 PROCEDURE — 99207 PR NO CHARGE NURSE ONLY: CPT | Performed by: FAMILY MEDICINE

## 2025-05-12 PROCEDURE — 84443 ASSAY THYROID STIM HORMONE: CPT

## 2025-05-12 PROCEDURE — 36415 COLL VENOUS BLD VENIPUNCTURE: CPT

## 2025-05-12 PROCEDURE — 84100 ASSAY OF PHOSPHORUS: CPT

## 2025-05-12 PROCEDURE — 83970 ASSAY OF PARATHORMONE: CPT

## 2025-05-12 PROCEDURE — 80053 COMPREHEN METABOLIC PANEL: CPT

## 2025-05-12 PROCEDURE — 3078F DIAST BP <80 MM HG: CPT | Performed by: FAMILY MEDICINE

## 2025-05-12 PROCEDURE — 82306 VITAMIN D 25 HYDROXY: CPT

## 2025-05-12 PROCEDURE — 3074F SYST BP LT 130 MM HG: CPT | Performed by: FAMILY MEDICINE

## 2025-05-12 NOTE — PROGRESS NOTES
N/Nannetteartie Awad was evaluated at [facility] on May 12, 2025 at which time her blood pressure was:    BP Readings from Last 1 Encounters:   05/12/25 124/75     No data recorded      Reviewed lifestyle modifications for blood pressure control and reduction: including making healthy food choices, managing weight, getting regular exercise, smoking cessation, reducing alcohol consumption, monitoring blood pressure regularly.     Symptoms: None    BP Goal:< 140/90 mmHg    BP Assessment:  BP at goal    Potential Reasons for BP too high: NA - Not applicable    BP Follow-Up Plan: Recheck BP in 6 months at pharmacy    Recommendation to Provider: n/a    Note completed by: Estrella Liao [facility]

## 2025-05-13 ENCOUNTER — ANCILLARY PROCEDURE (OUTPATIENT)
Dept: BONE DENSITY | Facility: CLINIC | Age: 70
End: 2025-05-13
Attending: INTERNAL MEDICINE
Payer: COMMERCIAL

## 2025-05-13 DIAGNOSIS — M85.9 LOW BONE DENSITY: ICD-10-CM

## 2025-05-13 DIAGNOSIS — Z78.0 POST-MENOPAUSAL: ICD-10-CM

## 2025-05-13 LAB — PTH-INTACT SERPL-MCNC: 68 PG/ML (ref 15–65)

## 2025-05-13 PROCEDURE — 77080 DXA BONE DENSITY AXIAL: CPT | Mod: TC | Performed by: RADIOLOGY

## 2025-05-19 ENCOUNTER — RESULTS FOLLOW-UP (OUTPATIENT)
Dept: ENDOCRINOLOGY | Facility: CLINIC | Age: 70
End: 2025-05-19

## 2025-05-19 ENCOUNTER — VIRTUAL VISIT (OUTPATIENT)
Dept: ENDOCRINOLOGY | Facility: CLINIC | Age: 70
End: 2025-05-19
Payer: COMMERCIAL

## 2025-05-19 DIAGNOSIS — R82.994 HYPERCALCIURIA: ICD-10-CM

## 2025-05-19 DIAGNOSIS — E83.52 HYPERCALCEMIA: ICD-10-CM

## 2025-05-19 DIAGNOSIS — M85.9 LOW BONE DENSITY: Primary | ICD-10-CM

## 2025-05-19 PROCEDURE — 98006 SYNCH AUDIO-VIDEO EST MOD 30: CPT | Performed by: INTERNAL MEDICINE

## 2025-05-19 NOTE — NURSING NOTE
Current patient location: [place]    Is the patient currently in the state of [place]? YES    Visit mode: VIDEO    If the visit is dropped, the patient can be reconnected by:VIDEO VISIT: Text to cell phone:   Telephone Information:   Mobile [phone]       Will anyone else be joining the visit? NO  (If patient encounters technical issues they should call [phone]    Are changes needed to the allergy or medication list? E-check in was reviewed/completed for today's visit. VF did not review e-check in information again with Pat due to this.       Are refills needed on medications prescribed by this physician? Discuss with provider    Rooming Documentation:  Not applicable    Reason for visit: RECHECK    Tresa MARRERO

## 2025-05-19 NOTE — LETTER
5/19/2025       RE: Nannette Awad  [address]  [place] [address]     Dear Colleague,    Thank you for referring your patient, Nannette Awad, to the [facility] [place] at [facility]. Please see a copy of my visit note below.      ENDOCRINOLOGY VIDEO FOLLOW-UP        HISTORY OF PRESENT ILLNESS    Nannette Awad (prefers to be called Pat) is seen in follow-up via billable video visit.    Had follow-up DXA scan completed on 5/13/2025 showed low bone density.  I reviewed images:  -L1-L3 T-score -0.1, 1.8% increase in BMD compared to 2022 (not significant)  -Left femoral neck T-score -1.9, left total hip T-score -1.6  -Right femoral neck T-score -2.0, right total hip T-score -1.2; 0.1% decline in BMD at the total mean hips compared to 2022 (not significant)    No new events related to bone such as fall or fracture in the interim since last visit.    Continues on alendronate, which was initiated in 5/2023: As before, she has been taking the medication once weekly only with water and waiting 30 minutes before having beverages, food or other medications.  She is tolerating it without side effects.    Continues on calcium supplement 2 tablets twice daily of Fleming brand calcium citrate (2 tablets provide 500 mg calcium citrate, 800 IU vitamin D3).    Has adjusted vitamin D3 as we planned: As of 11/2024, she is taking 2000 IU twice a week.    Pertinent endocrine and related history:  1.  Low bone density.  -Earliest DXA scan performed in our system was in 2006.  This study showed low bone density, with lowest T score -2.0 at the left femoral neck.  The patient was treated with Fosamax at that time and plan was to continue treatment.  Fosamax treatment continued at 70 mg weekly; in 8/2011, dosage was reduced to 35 mg weekly due to decline in kidney function.  -Most recent DXA scan performed on 9/20/2022 showed low bone density:  L1-L4  T score 0.0, with 2.6% decline in BMD compared to 2019 (significant).  Left femoral neck T score -2.0, left total hip T score -1.5.  Right femoral neck T score -2.0, right total hip T score -1.3.  1.7% decline in BMD at the total mean hip compared to 2019 (not significant).  -In 2/2012, the patient saw Dr. Chavarria in endocrinology.  At that time, endocrinology progress note mentions patient had been on Fosamax for 10 years.  The medication was discontinued at that time.  It does not appear that patient followed up in endocrinology thereafter.  -Prior fractures:  C1 and C2 compression fracture in 4/2022, diagnosed after acute onset of neck pain. Was sitting on toilet and felt like she was going to pass out--fell (perhaps off toilet) the patient was hospitalized in the [facility] system.  Right foot stress fracture in 6/2010. Had recurrent stress fracture in same foot and in the same bone within 6 months.  -We screened for occult vertebral fractures in the thoracic and lumbar spine, no fractures were noted on x-rays.  Our work-up for secondary causes of low bone density revealed mild hypercalciuria  otherwise normal screening for multiple myeloma, normal PTH, normal CBC and thyroid function test.   -Started alendronate in 5/2023.  2.  History of intermittent hypercalcemia.  Mild, noted in 2021 in 2022.  -No history of nephrolithiasis.  3.  High risk for diabetes.    Other pertinent medical history includes GERD, hypertension, hyperlipidemia, mitral valve prolapse and obesity.    Pertinent Social History: Lives in [place], [place].  , 2 children, 3 grandchildren, after children grew up worked as an . Now retired.    PAST MEDICAL HISTORY  Past Medical History:   Diagnosis Date     Arthritis 2012     Bursitis of knee 2012    Left leg     Carpal tunnel syndrome 1970    Left wrist, no surgery     Elevated fasting glucose      Family history of breast cancer      GERD (gastroesophageal reflux disease)     EGD       HSIL (high grade squamous intraepithelial lesion) on Pap smear of cervix 2006    s/p hyst      HTN (hypertension)      Hyperlipidemia LDL goal < 130 2002     Insomnia 1995     Left sided sciatica 2012     Migraine      Mitral (valve) prolapse      Obesity 04/16/2014     Osteopenia 2000     Psoriasis      Raynaud's disease      Tubular adenoma      Vitamin D deficiency 2008       MEDICATIONS  Current Outpatient Medications   Medication Sig Dispense Refill     alendronate (FOSAMAX) 70 MG tablet Take 1 tablet (70 mg) by mouth every 7 days. Take 60 minutes before am meal with 8 oz. water. Remain upright for 30 minutes. 12 tablet 3     amLODIPine (NORVASC) 10 MG tablet Take 1 tablet (10 mg) by mouth every morning. 90 tablet 1     aspirin (ASA) 325 MG EC tablet Take 325 mg by mouth daily.       Calcium Citrate-Vitamin D (CALCIUM CITRATE + D PO) Take 2 tablets by mouth 2 times daily. 250 mg calcium citrate/400 international unit(s) vitamin D per tablet       clobetasol (TEMOVATE) 0.05 % external ointment Apply topically 2 times daily as needed.       latanoprost (XALATAN) 0.005 % ophthalmic solution Place 1 drop into both eyes at bedtime.       lisinopril (ZESTRIL) 10 MG tablet Take one tablet daily. 90 tablet 1     omeprazole (PRILOSEC) 20 MG DR capsule TAKE 1 CAPSULE EVERY DAY 90 capsule 3     pravastatin (PRAVACHOL) 40 MG tablet TAKE 1 TABLET EVERY DAY 90 tablet 3     SUMAtriptan (IMITREX) 50 MG tablet TAKE 1 TABLET  AT ONSET OF HEADACHE FOR MIGRAINE MAY REPEAT IN 2 HOURS. MAX 4 TABLETS/24 HOURS. 27 tablet 1     timolol maleate (TIMOPTIC) 0.5 % ophthalmic solution Place 1 drop Into the left eye at bedtime.       triamcinolone (KENALOG) 0.1 % external cream Apply topically 2 times daily To rash on chest and affected area on vulva 45 g 2     vitamin D3 (CHOLECALCIFEROL) 50 mcg (2000 units) tablet Take 1 tablet by mouth twice a week.       zolpidem (AMBIEN) 5 MG tablet Take 1 tablet (5 mg) by mouth nightly as needed  for sleep. 90 tablet 1       Allergies, family, and social history were reviewed and documented as needed in EHR.     REVIEW OF SYSTEMS  A focused ROS was performed, with pertinent positives and negatives as noted in the HPI.    PHYSICAL EXAM  There were no vitals taken for this visit.  There is no height or weight on file to calculate BMI.  Constitutional: Patient is alert, oriented and appears in no acute distress.  Eyes: Eyes grossly normal to inspection, EOMI, no stare, lid lag, or retraction; no conjunctival injection.  ENMT: Lips are without lesions.   Neck: No visible goiter or neck mass.  Respiratory: No audible wheeze or cough. No visible cyanosis. No visible increased work of breathing.  Neurological: Alert and oriented times 3.  Cranial nerves grossly intact.      DATA REVIEW  Each of the following laboratory and/or imaging studies were reviewed.    DXA as in HPI.          ASSESSMENT  1.  Low bone density.  With high FRAX calculated 10-year risk of fracture: Initiated alendronate in 5/2023 and she has been tolerating this without side effect.  Follow-up DXA scan shows overall stable BMD (as summarized in HPI).  Based on these findings, would continue current regimen without changes.    2.  Elevated PTH.  Of unclear significance.  May be spurious/lab error.  Given history of intermittent hypercalcemia, this may also be indicative of early primary hyperparathyroidism.  Previsit labs showed normal serum calcium.  Would recheck with serum calcium levels in 3 months.    3.  Hypercalcemia.  Mild and intermittent, captured on prior studies.  As above, mild abnormality in PTH on previsit labs.  SPEP and UPEP have previously been normal.  Recheck serum calcium in 3 months.    4.  Hypercalciuria.  Elevation in 24 urine calcium excretion on initial studies in 3/2023 (to 330 mg per 24 hours).  On follow-up testing, 24 urine calcium excretion had improved to 240 mg per 24 hours in 2023, 270 mg per 24 hours in 2024.   Follow for now, no additional intervention at present.    5.  History of abnormal thyroid function test.  Noted in 2012 and 2014, normal on follow-up testing last in 2017, 2023 and on previsit labs drawn prior to our appointment.    6.  High risk for diabetes.  Based on hemoglobin A1c.  Continue periodic monitoring in primary care.    7.  CKD.  Elevated BUN suggests perhaps mild volume depletion.  Continue to keep well-hydrated.  Does not take anti-inflammatories which could affect kidney function.  Had hypertension and hyperglycemia could be contributors.  Keep well-hydrated and recheck renal function in 3 months.  Otherwise, defer follow-up to PCP.    PLAN  -Schedule lab draw in 3 months; keep well-hydrated  -Continue vitamin D 2000 IU twice weekly  -Continue current calcium citrate supplement, 2 tablets twice daily  -Continue alendronate (Fosamax) 70 mg by mouth once a week, as already taking  -Follow-up in 1 year, with labs before visit  -We will communicate results via "[employer]t, or if needed by phone      Orders Placed This Encounter   Procedures     Albumin level     Basic metabolic panel     Vitamin D Deficiency     Parathyroid Hormone Intact         Video-Visit Details    Type of service:  Video visit    Physician location: Off site    Video Start Time: 11:28 AM  Video End Time: 11:45 AM    Platform used for Video Visit: Tr[employer]    I spent a total of 31 minutes on the date of encounter reviewing medical records, evaluating the patient, coordinating care and documenting in the EHR, as detailed above.      Marco Montaño MD   Division of Diabetes, Endocrinology and Metabolism  Department of Medicine      cc: Joy Dickson MD      Again, thank you for allowing me to participate in the care of your patient.      Sincerely,    Marco Montaño MD

## 2025-05-19 NOTE — PATIENT INSTRUCTIONS
-Schedule lab draw in 3 months; keep well-hydrated  -Continue vitamin D 2000 IU twice weekly  -Continue current calcium citrate supplement, 2 tablets twice daily  -Continue alendronate (Fosamax) 70 mg by mouth once a week, as already taking  -Follow-up in 1 year, with labs before visit  -We will communicate results via [employer], or if needed by phone

## 2025-05-19 NOTE — PROGRESS NOTES
ENDOCRINOLOGY VIDEO FOLLOW-UP        HISTORY OF PRESENT ILLNESS    Nannette Awad (prefers to be called Pat) is seen in follow-up via billable video visit.    Had follow-up DXA scan completed on 5/13/2025 showed low bone density.  I reviewed images:  -L1-L3 T-score -0.1, 1.8% increase in BMD compared to 2022 (not significant)  -Left femoral neck T-score -1.9, left total hip T-score -1.6  -Right femoral neck T-score -2.0, right total hip T-score -1.2; 0.1% decline in BMD at the total mean hips compared to 2022 (not significant)    No new events related to bone such as fall or fracture in the interim since last visit.    Continues on alendronate, which was initiated in 5/2023: As before, she has been taking the medication once weekly only with water and waiting 30 minutes before having beverages, food or other medications.  She is tolerating it without side effects.    Continues on calcium supplement 2 tablets twice daily of Fleming brand calcium citrate (2 tablets provide 500 mg calcium citrate, 800 IU vitamin D3).    Has adjusted vitamin D3 as we planned: As of 11/2024, she is taking 2000 IU twice a week.    Pertinent endocrine and related history:  1.  Low bone density.  -Earliest DXA scan performed in our system was in 2006.  This study showed low bone density, with lowest T score -2.0 at the left femoral neck.  The patient was treated with Fosamax at that time and plan was to continue treatment.  Fosamax treatment continued at 70 mg weekly; in 8/2011, dosage was reduced to 35 mg weekly due to decline in kidney function.  -Most recent DXA scan performed on 9/20/2022 showed low bone density:  L1-L4 T score 0.0, with 2.6% decline in BMD compared to 2019 (significant).  Left femoral neck T score -2.0, left total hip T score -1.5.  Right femoral neck T score -2.0, right total hip T score -1.3.  1.7% decline in BMD at the total mean hip compared to 2019 (not significant).  -In 2/2012, the patient saw Dr. Chavarria in  endocrinology.  At that time, endocrinology progress note mentions patient had been on Fosamax for 10 years.  The medication was discontinued at that time.  It does not appear that patient followed up in endocrinology thereafter.  -Prior fractures:  C1 and C2 compression fracture in 4/2022, diagnosed after acute onset of neck pain. Was sitting on toilet and felt like she was going to pass out--fell (perhaps off toilet) the patient was hospitalized in the [facility] system.  Right foot stress fracture in 6/2010. Had recurrent stress fracture in same foot and in the same bone within 6 months.  -We screened for occult vertebral fractures in the thoracic and lumbar spine, no fractures were noted on x-rays.  Our work-up for secondary causes of low bone density revealed mild hypercalciuria  otherwise normal screening for multiple myeloma, normal PTH, normal CBC and thyroid function test.   -Started alendronate in 5/2023.  2.  History of intermittent hypercalcemia.  Mild, noted in 2021 in 2022.  -No history of nephrolithiasis.  3.  High risk for diabetes.    Other pertinent medical history includes GERD, hypertension, hyperlipidemia, mitral valve prolapse and obesity.    Pertinent Social History: Lives in [place], [place].  , 2 children, 3 grandchildren, after children grew up worked as an . Now retired.    PAST MEDICAL HISTORY  Past Medical History:   Diagnosis Date    Arthritis 2012    Bursitis of knee 2012    Left leg    Carpal tunnel syndrome 1970    Left wrist, no surgery    Elevated fasting glucose     Family history of breast cancer     GERD (gastroesophageal reflux disease)     EGD     HSIL (high grade squamous intraepithelial lesion) on Pap smear of cervix 2006    s/p hyst     HTN (hypertension)     Hyperlipidemia LDL goal < 130 2002    Insomnia 1995    Left sided sciatica 2012    Migraine     Mitral (valve) prolapse     Obesity 04/16/2014    Osteopenia 2000    Psoriasis     Raynaud's disease      Tubular adenoma     Vitamin D deficiency 2008       MEDICATIONS  Current Outpatient Medications   Medication Sig Dispense Refill    alendronate (FOSAMAX) 70 MG tablet Take 1 tablet (70 mg) by mouth every 7 days. Take 60 minutes before am meal with 8 oz. water. Remain upright for 30 minutes. 12 tablet 3    amLODIPine (NORVASC) 10 MG tablet Take 1 tablet (10 mg) by mouth every morning. 90 tablet 1    aspirin (ASA) 325 MG EC tablet Take 325 mg by mouth daily.      Calcium Citrate-Vitamin D (CALCIUM CITRATE + D PO) Take 2 tablets by mouth 2 times daily. 250 mg calcium citrate/400 international unit(s) vitamin D per tablet      clobetasol (TEMOVATE) 0.05 % external ointment Apply topically 2 times daily as needed.      latanoprost (XALATAN) 0.005 % ophthalmic solution Place 1 drop into both eyes at bedtime.      lisinopril (ZESTRIL) 10 MG tablet Take one tablet daily. 90 tablet 1    omeprazole (PRILOSEC) 20 MG DR capsule TAKE 1 CAPSULE EVERY DAY 90 capsule 3    pravastatin (PRAVACHOL) 40 MG tablet TAKE 1 TABLET EVERY DAY 90 tablet 3    SUMAtriptan (IMITREX) 50 MG tablet TAKE 1 TABLET  AT ONSET OF HEADACHE FOR MIGRAINE MAY REPEAT IN 2 HOURS. MAX 4 TABLETS/24 HOURS. 27 tablet 1    timolol maleate (TIMOPTIC) 0.5 % ophthalmic solution Place 1 drop Into the left eye at bedtime.      triamcinolone (KENALOG) 0.1 % external cream Apply topically 2 times daily To rash on chest and affected area on vulva 45 g 2    vitamin D3 (CHOLECALCIFEROL) 50 mcg (2000 units) tablet Take 1 tablet by mouth twice a week.      zolpidem (AMBIEN) 5 MG tablet Take 1 tablet (5 mg) by mouth nightly as needed for sleep. 90 tablet 1       Allergies, family, and social history were reviewed and documented as needed in EHR.     REVIEW OF SYSTEMS  A focused ROS was performed, with pertinent positives and negatives as noted in the HPI.    PHYSICAL EXAM  There were no vitals taken for this visit.  There is no height or weight on file to calculate  BMI.  Constitutional: Patient is alert, oriented and appears in no acute distress.  Eyes: Eyes grossly normal to inspection, EOMI, no stare, lid lag, or retraction; no conjunctival injection.  ENMT: Lips are without lesions.   Neck: No visible goiter or neck mass.  Respiratory: No audible wheeze or cough. No visible cyanosis. No visible increased work of breathing.  Neurological: Alert and oriented times 3.  Cranial nerves grossly intact.      DATA REVIEW  Each of the following laboratory and/or imaging studies were reviewed.    DXA as in HPI.          ASSESSMENT  1.  Low bone density.  With high FRAX calculated 10-year risk of fracture: Initiated alendronate in 5/2023 and she has been tolerating this without side effect.  Follow-up DXA scan shows overall stable BMD (as summarized in HPI).  Based on these findings, would continue current regimen without changes.    2.  Elevated PTH.  Of unclear significance.  May be spurious/lab error.  Given history of intermittent hypercalcemia, this may also be indicative of early primary hyperparathyroidism.  Previsit labs showed normal serum calcium.  Would recheck with serum calcium levels in 3 months.    3.  Hypercalcemia.  Mild and intermittent, captured on prior studies.  As above, mild abnormality in PTH on previsit labs.  SPEP and UPEP have previously been normal.  Recheck serum calcium in 3 months.    4.  Hypercalciuria.  Elevation in 24 urine calcium excretion on initial studies in 3/2023 (to 330 mg per 24 hours).  On follow-up testing, 24 urine calcium excretion had improved to 240 mg per 24 hours in 2023, 270 mg per 24 hours in 2024.  Follow for now, no additional intervention at present.    5.  History of abnormal thyroid function test.  Noted in 2012 and 2014, normal on follow-up testing last in 2017, 2023 and on previsit labs drawn prior to our appointment.    6.  High risk for diabetes.  Based on hemoglobin A1c.  Continue periodic monitoring in primary care.    7.   CKD.  Elevated BUN suggests perhaps mild volume depletion.  Continue to keep well-hydrated.  Does not take anti-inflammatories which could affect kidney function.  Had hypertension and hyperglycemia could be contributors.  Keep well-hydrated and recheck renal function in 3 months.  Otherwise, defer follow-up to PCP.    PLAN  -Schedule lab draw in 3 months; keep well-hydrated  -Continue vitamin D 2000 IU twice weekly  -Continue current calcium citrate supplement, 2 tablets twice daily  -Continue alendronate (Fosamax) 70 mg by mouth once a week, as already taking  -Follow-up in 1 year, with labs before visit  -We will communicate results via OnBeep, or if needed by phone      Orders Placed This Encounter   Procedures    Albumin level    Basic metabolic panel    Vitamin D Deficiency    Parathyroid Hormone Intact         Video-Visit Details    Type of service:  Video visit    Physician location: Off site    Video Start Time: 11:28 AM  Video End Time: 11:45 AM    Platform used for Video Visit: Trudy BEY spent a total of 31 minutes on the date of encounter reviewing medical records, evaluating the patient, coordinating care and documenting in the EHR, as detailed above.      Marco Montaño MD   Division of Diabetes, Endocrinology and Metabolism  Department of Medicine      cc: Joy Dickson MD

## 2025-05-28 ENCOUNTER — TRANSFERRED RECORDS (OUTPATIENT)
Dept: HEALTH INFORMATION MANAGEMENT | Facility: CLINIC | Age: 70
End: 2025-05-28
Payer: COMMERCIAL

## 2025-07-07 DIAGNOSIS — I10 HYPERTENSION GOAL BP (BLOOD PRESSURE) < 140/90: ICD-10-CM

## 2025-07-08 RX ORDER — AMLODIPINE BESYLATE 10 MG/1
10 TABLET ORAL EVERY MORNING
Qty: 90 TABLET | Refills: 1 | Status: SHIPPED | OUTPATIENT
Start: 2025-07-08

## 2025-07-08 RX ORDER — LISINOPRIL 10 MG/1
10 TABLET ORAL DAILY
Qty: 90 TABLET | Refills: 1 | Status: SHIPPED | OUTPATIENT
Start: 2025-07-08

## 2025-07-08 RX ORDER — DULOXETIN HYDROCHLORIDE 20 MG/1
20 CAPSULE, DELAYED RELEASE ORAL DAILY
COMMUNITY
Start: 2025-06-05

## 2025-08-21 ENCOUNTER — LAB (OUTPATIENT)
Dept: LAB | Facility: CLINIC | Age: 70
End: 2025-08-21
Payer: COMMERCIAL

## 2025-08-21 DIAGNOSIS — E83.52 HYPERCALCEMIA: ICD-10-CM

## 2025-08-21 DIAGNOSIS — M85.9 LOW BONE DENSITY: ICD-10-CM

## 2025-08-22 ENCOUNTER — ANCILLARY PROCEDURE (OUTPATIENT)
Dept: GENERAL RADIOLOGY | Facility: CLINIC | Age: 70
End: 2025-08-22
Attending: INTERNAL MEDICINE
Payer: COMMERCIAL

## 2025-08-22 DIAGNOSIS — M25.551 HIP PAIN, RIGHT: ICD-10-CM

## 2025-08-22 PROCEDURE — 73502 X-RAY EXAM HIP UNI 2-3 VIEWS: CPT | Mod: TC | Performed by: RADIOLOGY

## (undated) DEVICE — CUFF FINGER CLEARSIGHT MED CSCM

## (undated) DEVICE — TUBING SUCTION 6"X3/16" N56A

## (undated) DEVICE — SOL WATER IRRIG 1000ML BOTTLE 2F7114

## (undated) DEVICE — ENDO TRAP POLYP E-TRAP 00711099

## (undated) DEVICE — KIT ENDO TURNOVER/PROCEDURE CARRY-ON 101822

## (undated) DEVICE — SNARE CAPIVATOR ROUND COLD SNR BX10 M00561101

## (undated) DEVICE — SUCTION MANIFOLD NEPTUNE 2 SYS 1 PORT 702-025-000

## (undated) RX ORDER — SODIUM CHLORIDE 9 MG/ML
INJECTION, SOLUTION INTRAVENOUS
Status: DISPENSED
Start: 2024-01-19